# Patient Record
Sex: MALE | Race: WHITE | NOT HISPANIC OR LATINO | Employment: OTHER | URBAN - METROPOLITAN AREA
[De-identification: names, ages, dates, MRNs, and addresses within clinical notes are randomized per-mention and may not be internally consistent; named-entity substitution may affect disease eponyms.]

---

## 2017-02-01 ENCOUNTER — TELEPHONE (OUTPATIENT)
Dept: PULMONOLOGY | Facility: HOSPICE | Age: 82
End: 2017-02-01

## 2017-02-02 NOTE — TELEPHONE ENCOUNTER
Zoe the pt's daughter called requesting samples of Breo 200 for the pt.  Last given: 10/13/16    Last OV: 10/13/16  Next OV: 4/20/17  COPD  Samples of Breo 200

## 2017-02-21 ENCOUNTER — HOSPITAL ENCOUNTER (OUTPATIENT)
Dept: LAB | Facility: MEDICAL CENTER | Age: 82
End: 2017-02-21
Attending: FAMILY MEDICINE
Payer: MEDICARE

## 2017-02-21 LAB
ALBUMIN SERPL BCP-MCNC: 4.4 G/DL (ref 3.2–4.9)
ALBUMIN/GLOB SERPL: 1.8 G/DL
ALP SERPL-CCNC: 46 U/L (ref 30–99)
ALT SERPL-CCNC: 13 U/L (ref 2–50)
ANION GAP SERPL CALC-SCNC: 7 MMOL/L (ref 0–11.9)
AST SERPL-CCNC: 19 U/L (ref 12–45)
BASOPHILS # BLD AUTO: 0.03 K/UL (ref 0–0.12)
BASOPHILS NFR BLD AUTO: 0.6 % (ref 0–1.8)
BILIRUB SERPL-MCNC: 0.9 MG/DL (ref 0.1–1.5)
BUN SERPL-MCNC: 10 MG/DL (ref 8–22)
CALCIUM SERPL-MCNC: 9.9 MG/DL (ref 8.5–10.5)
CHLORIDE SERPL-SCNC: 96 MMOL/L (ref 96–112)
CHOLEST SERPL-MCNC: 206 MG/DL (ref 100–199)
CO2 SERPL-SCNC: 30 MMOL/L (ref 20–33)
CREAT SERPL-MCNC: 0.72 MG/DL (ref 0.5–1.4)
CREAT UR-MCNC: 55.5 MG/DL
EOSINOPHIL # BLD: 0.37 K/UL (ref 0–0.51)
EOSINOPHIL NFR BLD AUTO: 7 % (ref 0–6.9)
ERYTHROCYTE [DISTWIDTH] IN BLOOD BY AUTOMATED COUNT: 47.5 FL (ref 35.9–50)
EST. AVERAGE GLUCOSE BLD GHB EST-MCNC: 105 MG/DL
GLOBULIN SER CALC-MCNC: 2.5 G/DL (ref 1.9–3.5)
GLUCOSE SERPL-MCNC: 99 MG/DL (ref 65–99)
HBA1C MFR BLD: 5.3 % (ref 0–5.6)
HCT VFR BLD AUTO: 38.4 % (ref 42–52)
HDLC SERPL-MCNC: 95 MG/DL
HGB BLD-MCNC: 13.5 G/DL (ref 14–18)
IMM GRANULOCYTES # BLD AUTO: 0.03 K/UL (ref 0–0.11)
IMM GRANULOCYTES NFR BLD AUTO: 0.6 % (ref 0–0.9)
LDLC SERPL CALC-MCNC: 100 MG/DL
LYMPHOCYTES # BLD: 1.76 K/UL (ref 1–4.8)
LYMPHOCYTES NFR BLD AUTO: 33.3 % (ref 22–41)
MCH RBC QN AUTO: 34.9 PG (ref 27–33)
MCHC RBC AUTO-ENTMCNC: 35.2 G/DL (ref 33.7–35.3)
MCV RBC AUTO: 99.2 FL (ref 81.4–97.8)
MICROALBUMIN UR-MCNC: <0.7 MG/DL
MICROALBUMIN/CREAT UR: NORMAL MG/G (ref 0–30)
MONOCYTES # BLD: 0.58 K/UL (ref 0–0.85)
MONOCYTES NFR BLD AUTO: 11 % (ref 0–13.4)
NEUTROPHILS # BLD: 2.51 K/UL (ref 1.82–7.42)
NEUTROPHILS NFR BLD AUTO: 47.5 % (ref 44–72)
NRBC # BLD AUTO: 0 K/UL
NRBC BLD-RTO: 0 /100 WBC
PLATELET # BLD AUTO: 207 K/UL (ref 164–446)
PMV BLD AUTO: 9 FL (ref 9–12.9)
POTASSIUM SERPL-SCNC: 4.8 MMOL/L (ref 3.6–5.5)
PROT SERPL-MCNC: 6.9 G/DL (ref 6–8.2)
PSA SERPL DL<=0.01 NG/ML-MCNC: 0.83 NG/ML (ref 0–4)
RBC # BLD AUTO: 3.87 M/UL (ref 4.7–6.1)
SODIUM SERPL-SCNC: 133 MMOL/L (ref 135–145)
TRIGL SERPL-MCNC: 54 MG/DL (ref 0–149)
TSH SERPL DL<=0.005 MIU/L-ACNC: 2.33 UIU/ML (ref 0.3–3.7)
WBC # BLD AUTO: 5.3 K/UL (ref 4.8–10.8)

## 2017-02-21 PROCEDURE — 84153 ASSAY OF PSA TOTAL: CPT

## 2017-02-21 PROCEDURE — 83036 HEMOGLOBIN GLYCOSYLATED A1C: CPT

## 2017-02-21 PROCEDURE — 84443 ASSAY THYROID STIM HORMONE: CPT

## 2017-02-21 PROCEDURE — 82570 ASSAY OF URINE CREATININE: CPT

## 2017-02-21 PROCEDURE — 80053 COMPREHEN METABOLIC PANEL: CPT

## 2017-02-21 PROCEDURE — 85025 COMPLETE CBC W/AUTO DIFF WBC: CPT

## 2017-02-21 PROCEDURE — 80061 LIPID PANEL: CPT

## 2017-02-21 PROCEDURE — 36415 COLL VENOUS BLD VENIPUNCTURE: CPT

## 2017-02-21 PROCEDURE — 82043 UR ALBUMIN QUANTITATIVE: CPT

## 2017-03-13 DIAGNOSIS — J43.2 CENTRILOBULAR EMPHYSEMA (HCC): ICD-10-CM

## 2017-03-13 NOTE — TELEPHONE ENCOUNTER
Have we ever prescribed this med? Yes.  If yes, what date? Samples 10/13/16    Last OV: 10/13/16    Next OV: 04/20/17    DX: Emphysema    Medications:   Requested Prescriptions     Pending Prescriptions Disp Refills   • Fluticasone Furoate-Vilanterol (BREO ELLIPTA) 200-25 MCG/INH AEROSOL POWDER, BREATH ACTIVATED 1 Each 5     Sig: Inhale 1 Inhalation by mouth every day. Rinse mouth after each use.

## 2017-04-18 DIAGNOSIS — I10 ESSENTIAL HYPERTENSION: ICD-10-CM

## 2017-04-19 RX ORDER — LISINOPRIL 20 MG/1
20 TABLET ORAL 2 TIMES DAILY
Qty: 180 TAB | Refills: 3 | Status: SHIPPED | OUTPATIENT
Start: 2017-04-19 | End: 2018-04-23 | Stop reason: SDUPTHER

## 2017-04-20 ENCOUNTER — OFFICE VISIT (OUTPATIENT)
Dept: PULMONOLOGY | Facility: HOSPICE | Age: 82
End: 2017-04-20
Payer: MEDICARE

## 2017-04-20 VITALS
WEIGHT: 134 LBS | DIASTOLIC BLOOD PRESSURE: 90 MMHG | OXYGEN SATURATION: 90 % | SYSTOLIC BLOOD PRESSURE: 145 MMHG | TEMPERATURE: 98.2 F | HEART RATE: 79 BPM | RESPIRATION RATE: 16 BRPM | BODY MASS INDEX: 21.53 KG/M2 | HEIGHT: 66 IN

## 2017-04-20 DIAGNOSIS — J43.2 CENTRILOBULAR EMPHYSEMA (HCC): ICD-10-CM

## 2017-04-20 PROCEDURE — 99213 OFFICE O/P EST LOW 20 MIN: CPT | Performed by: NURSE PRACTITIONER

## 2017-04-20 PROCEDURE — 1101F PT FALLS ASSESS-DOCD LE1/YR: CPT | Mod: 8P | Performed by: NURSE PRACTITIONER

## 2017-04-20 PROCEDURE — G8420 CALC BMI NORM PARAMETERS: HCPCS | Performed by: NURSE PRACTITIONER

## 2017-04-20 PROCEDURE — 4040F PNEUMOC VAC/ADMIN/RCVD: CPT | Performed by: NURSE PRACTITIONER

## 2017-04-20 PROCEDURE — G8432 DEP SCR NOT DOC, RNG: HCPCS | Performed by: NURSE PRACTITIONER

## 2017-04-20 PROCEDURE — 1036F TOBACCO NON-USER: CPT | Performed by: NURSE PRACTITIONER

## 2017-04-20 NOTE — PATIENT INSTRUCTIONS
1. Continue Advair 500/50 µg twice daily, Spiriva 2 inhalations daily, and pro-air as needed.  2. Continue oxygen at 2 L/m with exertion and 3 L while sleeping.

## 2017-04-20 NOTE — MR AVS SNAPSHOT
"Garrett Alvarez   2017 10:40 AM   Office Visit   MRN: [id number]    Department:  [facility]   Dept Phone:  [phone]    Description:  Male : 1934   Provider:  JESS Jordan           Reason for Visit     Follow-Up 6 Mth Follow Up      Allergies as of 2017     No Known Allergies      You were diagnosed with     Centrilobular emphysema (CMS-HCC)   [611902]         Vital Signs     Blood Pressure Pulse Temperature Respirations Height Weight    145/90 mmHg 79 36.8 °C (98.2 °F) 16 1.676 m (5' 5.98\") 60.782 kg (134 lb)    Body Mass Index Oxygen Saturation Smoking Status             21.64 kg/m2 90% Former Smoker         Basic Information     Date Of Birth Sex Race Ethnicity Preferred Language    1934 Male White Non- English      Your appointments     Oct 26, 2017 11:20 AM   Established Patient Pul with JESS Jordan   [facility] (--)    [address] [place] [address] [address]  [facility] [id number] [phone]              Problem List              ICD-10-CM Priority Class Noted - Resolved    Dyspnea R06.00 Medium  3/1/2013 - Present    HTN (hypertension) I10 High  3/1/2013 - Present    HLD (hyperlipidemia) E78.5 Medium  3/1/2013 - Present    PAH (pulmonary artery hypertension) (CMS-HCC) I27.2 High  2014 - Present    Centrilobular emphysema (CMS-HCC) J43.2 Medium  12/3/2015 - Present      Health Maintenance        Date Due Completion Dates    IMM DTaP/Tdap/Td Vaccine (1 - Tdap) 1953 ---    COLONOSCOPY 1984 ---    IMM ZOSTER VACCINE 1994 ---    IMM PNEUMOCOCCAL 65+ (ADULT) LOW/MEDIUM RISK SERIES (1 of 2 - PCV13) 1999 ---            Current Immunizations     13-VALENT PCV PREVNAR 10/21/2014    Influenza TIV (IM) 10/21/2014      Below and/or attached are the medications your provider expects you to take. Review all of your home medications and newly ordered medications with your provider and/or pharmacist. Follow medication " instructions as directed by your provider and/or pharmacist. Please keep your medication list with you and share with your provider. Update the information when medications are discontinued, doses are changed, or new medications (including over-the-counter products) are added; and carry medication information at all times in the event of emergency situations     Allergies:  No Known Allergies          Medications  Valid as of: April 20, 2017 - 10:49 AM    Generic Name Brand Name Tablet Size Instructions for use    Albuterol Sulfate (Aero Soln) albuterol 108 (90 BASE) MCG/ACT Inhale 2 Puffs by mouth every 6 hours as needed for Shortness of Breath.        Atorvastatin Calcium (Tab) LIPITOR 10 MG Take 10 mg by mouth every evening.          Fluticasone Furoate-Vilanterol (AEROSOL POWDER, BREATH ACTIVATED) Fluticasone Furoate-Vilanterol 200-25 MCG/INH Inhale 1 Inhalation by mouth every day. Rinse mouth after each use.        Fluticasone-Salmeterol (AEROSOL POWDER, BREATH ACTIVATED) ADVAIR 500-50 MCG/DOSE Inhale 1 Puff by mouth every 12 hours.        Lisinopril (Tab) PRINIVIL 20 MG Take 1 Tab by mouth 2 times a day.        Tamsulosin HCl (Cap) FLOMAX 0.4 MG Take 1 Cap by mouth every bedtime.        Tiotropium Bromide Monohydrate (Cap) SPIRIVA 18 MCG Inhale 18 mcg by mouth every day.        .                 Medicines prescribed today were sent to:     [id number] - [place] - [address] [facility]    [address] [facility] [address]    Phone: [phone] Fax: [phone]    Open 24 Hours?: No      Medication refill instructions:       If your prescription bottle indicates you have medication refills left, it is not necessary to call your provider’s office. Please contact your pharmacy and they will refill your medication.    If your prescription bottle indicates you do not have any refills left, you may request refills at any time through one of the following ways: The online Floq system (except Urgent Care), by  calling your provider’s office, or by asking your pharmacy to contact your provider’s office with a refill request. Medication refills are processed only during regular business hours and may not be available until the next business day. Your provider may request additional information or to have a follow-up visit with you prior to refilling your medication.   *Please Note: Medication refills are assigned a new Rx number when refilled electronically. Your pharmacy may indicate that no refills were authorized even though a new prescription for the same medication is available at the pharmacy. Please request the medicine by name with the pharmacy before contacting your provider for a refill.        Instructions    1. Continue Advair 500/50 µg twice daily, Spiriva 2 inhalations daily, and pro-air as needed.  2. Continue oxygen at 2 L/m with exertion and 3 L while sleeping.               Overflow Cafe Access Code: [email or web address]  Expires: 5/20/2017 10:24 AM    Overflow Cafe  A secure, online tool to manage your health information     [employer]’s Overflow Cafe® is a secure, online tool that connects you to your personalized health information from the privacy of your home -- day or night - making it very easy for you to manage your healthcare. Once the activation process is completed, you can even access your medical information using the Overflow Cafe tanja, which is available for free in the Apple Tanja store or Google Play store.     Overflow Cafe provides the following levels of access (as shown below):   My Chart Features   [facility] Doctor [facility]  Specialists [facility]  Urgent  Care Non-[facility]  Primary Care  Doctor   Email your healthcare team securely and privately 24/7 X X X    Manage appointments: schedule your next appointment; view details of past/upcoming appointments X      Request prescription refills. X      View recent personal medical records, including lab and immunizations X X X X   View health record, including health  history, allergies, medications X X X X   Read reports about your outpatient visits, procedures, consult and ER notes X X X X   See your discharge summary, which is a recap of your hospital and/or ER visit that includes your diagnosis, lab results, and care plan. X X       How to register for Openplay:  1. Go to  [email or web address].  2. Click on the Sign Up Now box, which takes you to the New Member Sign Up page. You will need to provide the following information:  a. Enter your Openplay Access Code exactly as it appears at the top of this page. (You will not need to use this code after you’ve completed the sign-up process. If you do not sign up before the expiration date, you must request a new code.)   b. Enter your date of birth.   c. Enter your home email address.   d. Click Submit, and follow the next screen’s instructions.  3. Create a Openplay ID. This will be your Openplay login ID and cannot be changed, so think of one that is secure and easy to remember.  4. Create a Ingresset password. You can change your password at any time.  5. Enter your Password Reset Question and Answer. This can be used at a later time if you forget your password.   6. Enter your e-mail address. This allows you to receive e-mail notifications when new information is available in Openplay.  7. Click Sign Up. You can now view your health information.    For assistance activating your Openplay account, call [phone]

## 2017-04-20 NOTE — PROGRESS NOTES
"Chief Complaint   Patient presents with   • Follow-Up     6 Mth Follow Up         HPI:  This is a 82 y.o. male with a history of chronic obstructive pulmonary disease. Pulmonary function tests from 2013 indicate FEV1 0.76 L, 31% predicted with positive bronchodilator response, FEV1 4/FVC 35%, and DLCO 57% predicted. The patient is compliant with Advair 500/50 µg twice daily, Spiriva 2 inhalations daily, and pro-air minimally. The patient is also compliant with oxygen at 2 L/m with exertion and 3 L while sleeping.. the patient feels that his pulmonary status is stable. He has shortness of breath and wheezing with significant exertion. He has occasional cough. He denies fevers, chills, sweats, and hemoptysis.    Past Medical History   Diagnosis Date   • Dyslipidemia    • Pulmonary hypertension (CMS-HCC)    • COPD (chronic obstructive pulmonary disease) (CMS-HCC)    • Hypertension    • Pulmonary emphysema (CMS-HCC)    • Centrilobular emphysema (CMS-HCC) 12/3/2015       Past Surgical History   Procedure Laterality Date   • Hernia repair  1990     right inguinal hernia        Social History   Substance Use Topics   • Smoking status: Former Smoker     Quit date: 01/01/2008   • Smokeless tobacco: Never Used   • Alcohol Use: 1.0 oz/week     2 Cans of beer per week      Comment: week       ROS:   Constitutional: Denies fevers, chills, sweats, fatigue, and weight loss.  Eyes: Denies glasses.  Ears/nose/mouth/throat: Denies injury.  Cardiovascular: Denies chest pain, tightness.  Respiratory: See history of present illness.  GI: Denies heartburn, difficulty swallowing, nausea, and vomiting.  Neurological: Denies frequent headaches, dizziness, weakness.    Vitals:  Filed Vitals:    04/20/17 1028   Height: 1.676 m (5' 5.98\")   Weight: 60.782 kg (134 lb)   Weight % change since last entry.: 0 %   BP: 145/90   Pulse: 79   BMI (Calculated): 21.64   Resp: 16   Temp: 36.8 °C (98.2 °F)   O2 sat % room air: 90 %       Allergies:  Review " of patient's allergies indicates no known allergies.    Medications:  Current Outpatient Prescriptions   Medication Sig Dispense Refill   • lisinopril (PRINIVIL) 20 MG Tab Take 1 Tab by mouth 2 times a day. 180 Tab 3   • fluticasone-salmeterol (ADVAIR) 500-50 MCG/DOSE AEROSOL POWDER, BREATH ACTIVATED Inhale 1 Puff by mouth every 12 hours. 1 Inhaler 6   • tiotropium (SPIRIVA) 18 MCG Cap Inhale 18 mcg by mouth every day.     • albuterol (VENTOLIN OR PROVENTIL) 108 (90 BASE) MCG/ACT Aero Soln inhalation aerosol Inhale 2 Puffs by mouth every 6 hours as needed for Shortness of Breath.     • tamsulosin (FLOMAX) 0.4 MG capsule Take 1 Cap by mouth every bedtime. 90 Cap 3   • atorvastatin (LIPITOR) 10 MG TABS Take 10 mg by mouth every evening.       • Fluticasone Furoate-Vilanterol (BREO ELLIPTA) 200-25 MCG/INH AEROSOL POWDER, BREATH ACTIVATED Inhale 1 Inhalation by mouth every day. Rinse mouth after each use. (Patient not taking: Reported on 4/20/2017) 1 Each 5     No current facility-administered medications for this visit.       PHYSICAL EXAM:  Appearance: Well-developed, well-nourished, no acute distress.  Eyes. PERRL.  Hearing: Grossly intact.  Oropharynx: Tongue normal, posterior pharynx without erythema or exudate.  Respiratory effort: No intercostal retractions or use of accessory muscles.  Lung auscultation: No crackles, wheezing.  Heart auscultation: No murmur, gallop, or rub. Regular rate and rhythm.  Extremities: No cyanosis or edema.  Gait and Station: Normal  Orientation: Oriented to time, place, and person.    Assessment:  1. Centrilobular emphysema (CMS-HCC)           Plan:  1. Continue Advair 500/50 µg twice daily, Spiriva 2 inhalations daily, and pro-air as needed.  2. Continue oxygen at 2 L/m with exertion and 3 L while sleeping.     Return in about 6 months (around 10/20/2017) for With LEODAN Forde.

## 2017-05-08 DIAGNOSIS — J44.9 CHRONIC OBSTRUCTIVE PULMONARY DISEASE, UNSPECIFIED COPD TYPE (HCC): ICD-10-CM

## 2017-05-08 NOTE — TELEPHONE ENCOUNTER
Have we ever prescribed this med? No.  If yes, what date? Pt had samples    Last OV: 4/20/2017    Next OV: 10/26/2017    DX: COPD    Medications: Spiriva Respimat Samples

## 2017-07-05 ENCOUNTER — TELEPHONE (OUTPATIENT)
Dept: PULMONOLOGY | Facility: HOSPICE | Age: 82
End: 2017-07-05

## 2017-07-05 DIAGNOSIS — J44.9 CHRONIC OBSTRUCTIVE PULMONARY DISEASE, UNSPECIFIED COPD TYPE (HCC): ICD-10-CM

## 2017-07-05 NOTE — TELEPHONE ENCOUNTER
I am fine with him having samples. However, I was informed we no longer are allowed to dispense samples from our office. Please check with the main sample pharmacy to see if Spiriva 2.5 mcg is available.

## 2017-07-05 NOTE — TELEPHONE ENCOUNTER
Zoe the pt's daughter called requesting samples of Spiriva Respimat 2.5.    Last OV: 4/20/17  Next OV: 10/4/17  COPD

## 2017-07-10 ENCOUNTER — TELEPHONE (OUTPATIENT)
Dept: PULMONOLOGY | Facility: HOSPICE | Age: 82
End: 2017-07-10

## 2017-07-10 DIAGNOSIS — J44.9 CHRONIC OBSTRUCTIVE PULMONARY DISEASE, UNSPECIFIED COPD TYPE (HCC): ICD-10-CM

## 2017-07-10 NOTE — TELEPHONE ENCOUNTER
Fortino, Pt Daughter called stating her Father Garrett needed samples of Spiriva. She would also like a prescription for the medication as well.    Last OV 4-20-17  Next OV- 10-4-17   COPD    Samples last oked- 7-5-17

## 2017-07-25 ENCOUNTER — TELEPHONE (OUTPATIENT)
Dept: SLEEP MEDICINE | Facility: MEDICAL CENTER | Age: 82
End: 2017-07-25

## 2017-07-25 DIAGNOSIS — J44.9 CHRONIC OBSTRUCTIVE PULMONARY DISEASE, UNSPECIFIED COPD TYPE (HCC): ICD-10-CM

## 2017-09-14 DIAGNOSIS — Z79.899 MEDICATION MANAGEMENT: ICD-10-CM

## 2017-09-21 ENCOUNTER — OFFICE VISIT (OUTPATIENT)
Dept: PULMONOLOGY | Facility: HOSPICE | Age: 82
End: 2017-09-21
Payer: MEDICARE

## 2017-09-21 VITALS
WEIGHT: 124 LBS | SYSTOLIC BLOOD PRESSURE: 192 MMHG | HEIGHT: 66 IN | BODY MASS INDEX: 19.93 KG/M2 | OXYGEN SATURATION: 94 % | TEMPERATURE: 97.9 F | HEART RATE: 129 BPM | DIASTOLIC BLOOD PRESSURE: 110 MMHG | RESPIRATION RATE: 16 BRPM

## 2017-09-21 DIAGNOSIS — J43.2 CENTRILOBULAR EMPHYSEMA (HCC): ICD-10-CM

## 2017-09-21 DIAGNOSIS — I27.21 PAH (PULMONARY ARTERY HYPERTENSION) (HCC): ICD-10-CM

## 2017-09-21 PROCEDURE — 90662 IIV NO PRSV INCREASED AG IM: CPT | Performed by: NURSE PRACTITIONER

## 2017-09-21 PROCEDURE — G0008 ADMIN INFLUENZA VIRUS VAC: HCPCS | Performed by: NURSE PRACTITIONER

## 2017-09-21 PROCEDURE — 99213 OFFICE O/P EST LOW 20 MIN: CPT | Mod: 25 | Performed by: NURSE PRACTITIONER

## 2017-09-21 RX ORDER — ALBUTEROL SULFATE 2.5 MG/3ML
2.5 SOLUTION RESPIRATORY (INHALATION) EVERY 4 HOURS PRN
Qty: 60 BULLET | Refills: 5 | Status: SHIPPED | OUTPATIENT
Start: 2017-09-21 | End: 2018-06-02

## 2017-09-21 NOTE — PATIENT INSTRUCTIONS
1. Continue Advair 500/50 µg one inhalation twice daily.  2. Spiriva 2.5 µg 2 inhalations daily.  3. Pro-air 2 puffs every 4 hours as needed for shortness of breath or wheezing.  4. Albuterol nebulized treatments, one treatment every 4 hours as needed for shortness of breath or wheezing.  5. Obtain oximeter. Keep oxygen saturations 90-94 percent.  Okay for samples.  7. Influenza vaccine.

## 2017-09-22 NOTE — PROGRESS NOTES
Chief Complaint   Patient presents with   • Follow-Up         HPI:  This is a 83 y.o. male with a history of chronic obstructive pulmonary disease. Pulmonary function tests from 2013 indicate FEV1 0.76 L, 31% predicted with positive bronchodilator response, FEV1 4/FVC 35%, and DLCO 57% predicted. The patient is compliant with Advair 500/50 µg twice daily, Spiriva 2 inhalations daily, and pro-air minimally. The patient came in today on 3 L pulsed oxygen with 83% saturation. The patient required 4 L continuous flow to recovered to 93%. Once patient was recovered his oxygen saturations were able to be reduced to 2 L with oxygen saturations of 94%. Patient requires continuous flow of oxygen. 3 L while sleeping. Unfortunately he has not been doing very well as of late due to pulsed oxygen. I believe he is likely had low oxygen levels on a regular basis and that has been contributing to him feeling poorly. He has shortness of breath with minimal exertion. He denies fevers, chills, sweats, and hemoptysis.    Past Medical History:   Diagnosis Date   • Centrilobular emphysema (CMS-HCC) 12/3/2015   • COPD (chronic obstructive pulmonary disease) (CMS-HCC)    • Dyslipidemia    • Hypertension    • Pulmonary emphysema (CMS-HCC)    • Pulmonary hypertension (CMS-HCC)        Past Surgical History:   Procedure Laterality Date   • HERNIA REPAIR  1990    right inguinal hernia        Social History   Substance Use Topics   • Smoking status: Former Smoker     Quit date: 1/1/2008   • Smokeless tobacco: Never Used   • Alcohol use 1.0 oz/week     2 Cans of beer per week      Comment: week       ROS:   Constitutional: Denies fevers, chills, sweats, fatigue, and weight loss.  Eyes: Denies glasses.  Ears/nose/mouth/throat: Denies injury.  Cardiovascular: Denies chest pain, tightness.  Respiratory: See history of present illness.  GI: Denies heartburn, difficulty swallowing, nausea, and vomiting.  Neurological: Denies frequent headaches,  "dizziness, weakness.    Vitals:  Vitals:    09/21/17 1634   Height: 1.676 m (5' 5.98\")   Weight: 56.2 kg (124 lb)   Weight % change since last entry.: 0 %   BP: (!) 192/110   Pulse: (!) 129   BMI (Calculated): 20.03   Resp: 16   Temp: 36.6 °C (97.9 °F)   O2 sat % on O2: 84 %   O2 Flow Rate (L/min): 3       Allergies:  Review of patient's allergies indicates no known allergies.    Medications:  Current Outpatient Prescriptions   Medication Sig Dispense Refill   • albuterol (PROVENTIL) 2.5mg/3ml Nebu Soln solution for nebulization 3 mL by Nebulization route every four hours as needed for Shortness of Breath. 60 Bullet 5   • fluticasone-salmeterol (ADVAIR) 500-50 MCG/DOSE AEROSOL POWDER, BREATH ACTIVATED Inhale 1 Puff by mouth every 12 hours. Rinse mouth after use 1 Inhaler 5   • Tiotropium Bromide Monohydrate (SPIRIVA RESPIMAT) 2.5 MCG/ACT Aero Soln Inhale 5 mcg by mouth every day. 1 Inhaler 5   • Tiotropium Bromide Monohydrate (SPIRIVA RESPIMAT) 2.5 MCG/ACT Aero Soln Inhale 2 Puffs by mouth every day. 30 Inhaler 0   • lisinopril (PRINIVIL) 20 MG Tab Take 1 Tab by mouth 2 times a day. 180 Tab 3   • tiotropium (SPIRIVA) 18 MCG Cap Inhale 18 mcg by mouth every day.     • albuterol (VENTOLIN OR PROVENTIL) 108 (90 BASE) MCG/ACT Aero Soln inhalation aerosol Inhale 2 Puffs by mouth every 6 hours as needed for Shortness of Breath.     • tamsulosin (FLOMAX) 0.4 MG capsule Take 1 Cap by mouth every bedtime. 90 Cap 3   • atorvastatin (LIPITOR) 10 MG TABS Take 10 mg by mouth every evening.         No current facility-administered medications for this visit.        PHYSICAL EXAM:  Appearance: Well-developed, well-nourished, no acute distress.  Eyes. PERRL.  Hearing: Grossly intact.  Oropharynx: Tongue normal, posterior pharynx without erythema or exudate.  Respiratory effort: No intercostal retractions or use of accessory muscles.  Lung auscultation: No crackles, wheezing.  Heart auscultation: No murmur, gallop, or rub. Regular " rate and rhythm.  Extremities: No cyanosis or edema.  Gait and Station: Normal  Orientation: Oriented to time, place, and person.    Assessment:  1. Centrilobular emphysema (CMS-[facility])  DME OTHER    INFLUENZA VACCINE, HIGH DOSE (65+ ONLY)    DME NEBULIZER   2. PAH (pulmonary artery hypertension) (CMS-HCC)           Plan:  1. Continue Advair 500/50 µg one inhalation twice daily.  2. Spiriva 2.5 µg 2 inhalations daily.  3. Pro-air 2 puffs every 4 hours as needed for shortness of breath or wheezing.  4. Albuterol nebulized treatments, one treatment every 4 hours as needed for shortness of breath or wheezing.  5. Obtain oximeter. Keep oxygen saturations 90-94 percent.  6. Okay for samples.  7. Influenza vaccine.    Return in about 3 months (around 12/21/2017) for With LEODAN Forde.

## 2017-10-10 DIAGNOSIS — J44.9 CHRONIC OBSTRUCTIVE PULMONARY DISEASE, UNSPECIFIED COPD TYPE (HCC): ICD-10-CM

## 2017-10-10 NOTE — TELEPHONE ENCOUNTER
Have we ever prescribed this med? Yes.  If yes, what date? 7/12/17    Last OV: 9/12/17- DBaker    Next OV: 12/21/17    DX: COPD    Medications: Spiriva Respimat 2.5 MCG

## 2017-11-01 ENCOUNTER — OFFICE VISIT (OUTPATIENT)
Dept: CARDIOLOGY | Facility: MEDICAL CENTER | Age: 82
End: 2017-11-01
Payer: MEDICARE

## 2017-11-01 VITALS
DIASTOLIC BLOOD PRESSURE: 70 MMHG | HEART RATE: 98 BPM | SYSTOLIC BLOOD PRESSURE: 130 MMHG | BODY MASS INDEX: 20.03 KG/M2 | OXYGEN SATURATION: 95 % | WEIGHT: 124 LBS

## 2017-11-01 DIAGNOSIS — I10 ESSENTIAL HYPERTENSION: ICD-10-CM

## 2017-11-01 DIAGNOSIS — E78.00 PURE HYPERCHOLESTEROLEMIA: ICD-10-CM

## 2017-11-01 PROCEDURE — 99213 OFFICE O/P EST LOW 20 MIN: CPT | Performed by: INTERNAL MEDICINE

## 2017-11-01 ASSESSMENT — ENCOUNTER SYMPTOMS
MYALGIAS: 0
NERVOUS/ANXIOUS: 0
PALPITATIONS: 0
WEIGHT LOSS: 0
INSOMNIA: 0
EYES NEGATIVE: 1

## 2017-11-01 NOTE — PROGRESS NOTES
Subjective:   Garrett Alvarez is a 83 y.o. 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 Blood pressures have been very good at home    Past Medical History:   Diagnosis Date   • Centrilobular emphysema (CMS-HCC) 12/3/2015   • COPD (chronic obstructive pulmonary disease) (CMS-HCC)    • Dyslipidemia    • Hypertension    • Pulmonary emphysema (CMS-HCC)    • Pulmonary hypertension      Past Surgical History:   Procedure Laterality Date   • HERNIA REPAIR  1990    right inguinal hernia      Family History   Problem Relation Age of Onset   • Heart Disease Father    • Heart Attack Brother    • Other Brother      CAD   • Cancer Brother      lung, other     History   Smoking Status   • Former Smoker   • Quit date: 1/1/2008   Smokeless Tobacco   • Never Used     No Known Allergies  Outpatient Encounter Prescriptions as of 11/1/2017   Medication Sig Dispense Refill   • albuterol (PROVENTIL) 2.5mg/3ml Nebu Soln solution for nebulization 3 mL by Nebulization route every four hours as needed for Shortness of Breath. 60 Bullet 5   • fluticasone-salmeterol (ADVAIR) 500-50 MCG/DOSE AEROSOL POWDER, BREATH ACTIVATED Inhale 1 Puff by mouth every 12 hours. Rinse mouth after use 1 Inhaler 5   • Tiotropium Bromide Monohydrate (SPIRIVA RESPIMAT) 2.5 MCG/ACT Aero Soln Inhale 5 mcg by mouth every day. 1 Inhaler 5   • lisinopril (PRINIVIL) 20 MG Tab Take 1 Tab by mouth 2 times a day. 180 Tab 3   • tiotropium (SPIRIVA) 18 MCG Cap Inhale 18 mcg by mouth every day.     • albuterol (VENTOLIN OR PROVENTIL) 108 (90 BASE) MCG/ACT Aero Soln inhalation aerosol Inhale 2 Puffs by mouth every 6 hours as needed for Shortness of Breath.     • tamsulosin (FLOMAX) 0.4 MG capsule Take 1 Cap by mouth every bedtime. 90 Cap 3   • atorvastatin (LIPITOR) 10  MG TABS Take 10 mg by mouth every evening.       • Tiotropium Bromide Monohydrate (SPIRIVA RESPIMAT) 2.5 MCG/ACT Aero Soln Inhale 2 Puffs by mouth every day. 3 Inhaler 3     No facility-administered encounter medications on file as of 11/1/2017.      Review of Systems   Constitutional: Negative for malaise/fatigue and weight loss.   Eyes: Negative.    Respiratory:        Stable, with oxygen at night but doesn't like to wear it in the day   Cardiovascular: Negative for chest pain, palpitations and leg swelling.   Musculoskeletal: Negative for myalgias.   Psychiatric/Behavioral: The patient is not nervous/anxious and does not have insomnia.    All other systems reviewed and are negative.       Objective:   /70   Pulse 98   Wt 56.2 kg (124 lb)   SpO2 95%   BMI 20.03 kg/m²     Physical Exam   Constitutional: He is oriented to person, place, and time. He appears well-developed. No distress.   HENT:   Mouth/Throat: Mucous membranes are normal.   Eyes: Conjunctivae and EOM are normal.   Neck: No JVD present. No tracheal deviation present. No thyroid mass and no thyromegaly present.   Cardiovascular: Normal rate, regular rhythm and intact distal pulses.    No murmur heard.  Pulmonary/Chest: Effort normal and breath sounds normal. No respiratory distress. He exhibits no tenderness.   Abdominal: There is no tenderness.   Musculoskeletal: Normal range of motion. He exhibits no edema.   Neurological: He is alert and oriented to person, place, and time. He has normal strength. He displays no tremor.   Skin: Skin is warm and dry. He is not diaphoretic.   Psychiatric: He has a normal mood and affect. His behavior is normal.   Vitals reviewed.      Assessment:     1. Essential hypertension     2. Pure hypercholesterolemia         Medical Decision Making:  Today's Assessment / Status / Plan:     Hypertension   I spent more than 15 minutes reviewing his chart. We reviewed his echocardiogram from 2014, his pulmonary  hypertension is in the setting of emphysema. Blood pressure goal, no changes     Hyperlipidemia   Follows a primary care   CPK lipids and liver function annually with his statin     RTC one year, sooner with concerns

## 2017-12-21 ENCOUNTER — OFFICE VISIT (OUTPATIENT)
Dept: PULMONOLOGY | Facility: HOSPICE | Age: 82
End: 2017-12-21
Payer: MEDICARE

## 2017-12-21 VITALS
SYSTOLIC BLOOD PRESSURE: 128 MMHG | BODY MASS INDEX: 20.57 KG/M2 | HEIGHT: 66 IN | OXYGEN SATURATION: 98 % | TEMPERATURE: 97.5 F | RESPIRATION RATE: 16 BRPM | DIASTOLIC BLOOD PRESSURE: 70 MMHG | WEIGHT: 128 LBS | HEART RATE: 99 BPM

## 2017-12-21 DIAGNOSIS — J43.2 CENTRILOBULAR EMPHYSEMA (HCC): ICD-10-CM

## 2017-12-21 PROCEDURE — 94761 N-INVAS EAR/PLS OXIMETRY MLT: CPT | Performed by: NURSE PRACTITIONER

## 2017-12-21 PROCEDURE — 99213 OFFICE O/P EST LOW 20 MIN: CPT | Performed by: NURSE PRACTITIONER

## 2017-12-21 NOTE — PROGRESS NOTES
"Chief Complaint   Patient presents with   • Follow-Up     pulmonary artery hypertension         HPI:  This is a 83 y.o. male with a history of chronic obstructive pulmonary disease. Pulmonary function tests from 2013 indicate FEV1 0.76 L, 31% predicted with positive bronchodilator response, FEV1 4/FVC 35%, and DLCO 57% predicted. The patient is compliant with Advair 500/50 µg twice daily, Spiriva 2 inhalations daily, and pro-air minimally. Patient's oxygenation is adequate on room air at rest. He requires 4 L continuous flow if up and ambulating. Patient reports no significant shortness of breath and feeling tired if he walks too far. He has had some dizzy spells but has seen her primary care and had his ears cleaned and is improving. He denies wheezing, fever, chills, sweats, and hemoptysis.    Past Medical History:   Diagnosis Date   • Centrilobular emphysema (CMS-HCC) 12/3/2015   • COPD (chronic obstructive pulmonary disease) (CMS-HCC)    • Dyslipidemia    • Hypertension    • Pulmonary emphysema (CMS-HCC)    • Pulmonary hypertension        Past Surgical History:   Procedure Laterality Date   • HERNIA REPAIR  1990    right inguinal hernia        Social History   Substance Use Topics   • Smoking status: Former Smoker     Quit date: 1/1/2008   • Smokeless tobacco: Never Used   • Alcohol use 1.0 oz/week     2 Cans of beer per week      Comment: week       ROS:   Constitutional: Denies fevers, chills, sweats, fatigue, and weight loss.  Eyes: Denies glasses.  Ears/nose/mouth/throat: Denies injury.  Cardiovascular: Denies chest pain, tightness.  Respiratory: See history of present illness.  GI: Denies heartburn, difficulty swallowing, nausea, and vomiting.  Neurological: Denies frequent headaches, dizziness, weakness.    Vitals:  Vitals:    12/21/17 1332   Height: 1.676 m (5' 5.98\")   Weight: 58.1 kg (128 lb)   Weight % change since last entry.: 0 %   BP: 128/70   Pulse: 99   BMI (Calculated): 20.67   Resp: 16   Temp: " 36.4 °C (97.5 °F)   O2 sat % room air: 98 %   O2 Flow Rate (L/min): 3       Allergies:  Patient has no known allergies.    Medications:  Current Outpatient Prescriptions   Medication Sig Dispense Refill   • Tiotropium Bromide Monohydrate (SPIRIVA RESPIMAT) 2.5 MCG/ACT Aero Soln Inhale 2 Puffs by mouth every day. 3 Inhaler 3   • albuterol (PROVENTIL) 2.5mg/3ml Nebu Soln solution for nebulization 3 mL by Nebulization route every four hours as needed for Shortness of Breath. 60 Bullet 5   • fluticasone-salmeterol (ADVAIR) 500-50 MCG/DOSE AEROSOL POWDER, BREATH ACTIVATED Inhale 1 Puff by mouth every 12 hours. Rinse mouth after use 1 Inhaler 5   • Tiotropium Bromide Monohydrate (SPIRIVA RESPIMAT) 2.5 MCG/ACT Aero Soln Inhale 5 mcg by mouth every day. 1 Inhaler 5   • lisinopril (PRINIVIL) 20 MG Tab Take 1 Tab by mouth 2 times a day. 180 Tab 3   • tiotropium (SPIRIVA) 18 MCG Cap Inhale 18 mcg by mouth every day.     • albuterol (VENTOLIN OR PROVENTIL) 108 (90 BASE) MCG/ACT Aero Soln inhalation aerosol Inhale 2 Puffs by mouth every 6 hours as needed for Shortness of Breath.     • tamsulosin (FLOMAX) 0.4 MG capsule Take 1 Cap by mouth every bedtime. 90 Cap 3   • atorvastatin (LIPITOR) 10 MG TABS Take 10 mg by mouth every evening.         No current facility-administered medications for this visit.        PHYSICAL EXAM:  Appearance: Well-developed, well-nourished, no acute distress.  Eyes. PERRL.  Hearing: Grossly intact.  Oropharynx: Tongue normal, posterior pharynx without erythema or exudate.  Respiratory effort: No intercostal retractions or use of accessory muscles.  Lung auscultation: No crackles, wheezing.  Heart auscultation: No murmur, gallop, or rub. Regular rate and rhythm.  Extremities: No cyanosis or edema.  Gait and Station: Normal  Orientation: Oriented to time, place, and person.    Assessment:  1. Centrilobular emphysema (CMS-HCC)  AMB MULTIPLE OXIMETRY         Plan:  1. Continue Advair 500/50 µg twice daily,  Spiriva 2.5 µg 2 inhalations daily, and pro-air and albuterol nebulized treatments as needed.  2. Continue oxygen 4 L continuous flow with ambulation and while sleeping.  3. Keep oxygen saturation 90-94 percent.  4. Discussed the possibility of pulmonary rehabilitation. He is aware he would have to repeat pulmonary function tests. He would like to discuss that at his next visit.    Return in about 6 months (around 6/21/2018).

## 2017-12-21 NOTE — PATIENT INSTRUCTIONS
1. Continue Advair 500/50 µg twice daily, Spiriva 2.5 µg 2 inhalations daily, and pro-air and albuterol nebulized treatments as needed.  2. Continue oxygen 4 L continuous flow with ambulation and while sleeping.  3. Keep oxygen saturation 90-94 percent.

## 2017-12-21 NOTE — PROCEDURES
"Durable Medical Equipment-Specific Vitals  Vitals  Blood Pressure : 128/70  Temperature: 36.4 °C (97.5 °F)  Pulse: 99  Respiration: 16  Pulse Oximetry: 98 %  Height: 167.6 cm (5' 5.98\")  Weight: 58.1 kg (128 lb)          "

## 2018-03-27 ENCOUNTER — OFFICE VISIT (OUTPATIENT)
Dept: CARDIOLOGY | Facility: MEDICAL CENTER | Age: 83
End: 2018-03-27
Payer: MEDICARE

## 2018-03-27 VITALS
WEIGHT: 127.2 LBS | BODY MASS INDEX: 20.44 KG/M2 | HEART RATE: 106 BPM | DIASTOLIC BLOOD PRESSURE: 90 MMHG | SYSTOLIC BLOOD PRESSURE: 140 MMHG | HEIGHT: 66 IN | OXYGEN SATURATION: 98 %

## 2018-03-27 DIAGNOSIS — E78.00 PURE HYPERCHOLESTEROLEMIA: ICD-10-CM

## 2018-03-27 DIAGNOSIS — I10 ESSENTIAL HYPERTENSION: ICD-10-CM

## 2018-03-27 DIAGNOSIS — I27.21 PAH (PULMONARY ARTERY HYPERTENSION) (HCC): ICD-10-CM

## 2018-03-27 PROCEDURE — 99214 OFFICE O/P EST MOD 30 MIN: CPT | Performed by: INTERNAL MEDICINE

## 2018-03-27 ASSESSMENT — ENCOUNTER SYMPTOMS
NERVOUS/ANXIOUS: 0
EYES NEGATIVE: 1
BRUISES/BLEEDS EASILY: 0
DEPRESSION: 0
WEIGHT LOSS: 0
NAUSEA: 0
HEARTBURN: 0
PALPITATIONS: 0
COUGH: 0
FEVER: 0
INSOMNIA: 0
WHEEZING: 0
LOSS OF CONSCIOUSNESS: 0
SHORTNESS OF BREATH: 0
MUSCULOSKELETAL NEGATIVE: 1
DIZZINESS: 0

## 2018-03-27 NOTE — PROGRESS NOTES
Chief Complaint   Patient presents with   • Hypertension     F/V       Subjective:   Garrett Alvarez is a 83 y.o. male who presents today in follow-up in regards to his cardiovascular risk factors including hypertension and hyperlipidemia as well as pulmonary hypertension the setting of advanced emphysema    In good spirits, in with his daughter Fortino who works here  No setbacks not sure why he is here today  Wife is doing well      Past Medical History:   Diagnosis Date   • Centrilobular emphysema (CMS-HCC) 12/3/2015   • COPD (chronic obstructive pulmonary disease) (CMS-HCC)    • Dyslipidemia    • Hypertension    • Pulmonary emphysema (CMS-HCC)    • Pulmonary hypertension      Past Surgical History:   Procedure Laterality Date   • HERNIA REPAIR  1990    right inguinal hernia      Family History   Problem Relation Age of Onset   • Heart Disease Father    • Heart Attack Brother    • Other Brother      CAD   • Cancer Brother      lung, other     Social History     Social History   • Marital status:      Spouse name: N/A   • Number of children: N/A   • Years of education: N/A     Occupational History   • Not on file.     Social History Main Topics   • Smoking status: Former Smoker     Quit date: 1/1/2008   • Smokeless tobacco: Never Used   • Alcohol use 1.0 oz/week     2 Cans of beer per week      Comment: week   • Drug use: No   • Sexual activity: Yes     Partners: Female     Other Topics Concern   • Not on file     Social History Narrative   • No narrative on file     No Known Allergies  Outpatient Encounter Prescriptions as of 3/27/2018   Medication Sig Dispense Refill   • albuterol (PROVENTIL) 2.5mg/3ml Nebu Soln solution for nebulization 3 mL by Nebulization route every four hours as needed for Shortness of Breath. 60 Bullet 5   • fluticasone-salmeterol (ADVAIR) 500-50 MCG/DOSE AEROSOL POWDER, BREATH ACTIVATED Inhale 1 Puff by mouth every 12 hours. Rinse mouth after use 1 Inhaler 5   • Tiotropium  "Bromide Monohydrate (SPIRIVA RESPIMAT) 2.5 MCG/ACT Aero Soln Inhale 5 mcg by mouth every day. 1 Inhaler 5   • lisinopril (PRINIVIL) 20 MG Tab Take 1 Tab by mouth 2 times a day. 180 Tab 3   • tamsulosin (FLOMAX) 0.4 MG capsule Take 1 Cap by mouth every bedtime. 90 Cap 3   • atorvastatin (LIPITOR) 10 MG TABS Take 10 mg by mouth every evening.       • [DISCONTINUED] Tiotropium Bromide Monohydrate (SPIRIVA RESPIMAT) 2.5 MCG/ACT Aero Soln Inhale 2 Puffs by mouth every day. 3 Inhaler 3   • tiotropium (SPIRIVA) 18 MCG Cap Inhale 18 mcg by mouth every day.     • [DISCONTINUED] albuterol (VENTOLIN OR PROVENTIL) 108 (90 BASE) MCG/ACT Aero Soln inhalation aerosol Inhale 2 Puffs by mouth every 6 hours as needed for Shortness of Breath.       No facility-administered encounter medications on file as of 3/27/2018.      Review of Systems   Constitutional: Negative for fever, malaise/fatigue and weight loss.   HENT: Negative for hearing loss.    Eyes: Negative.    Respiratory: Negative for cough, shortness of breath and wheezing.         No changes on chronic oxygen, 4 L   Cardiovascular: Negative for chest pain, palpitations and leg swelling.   Gastrointestinal: Negative for heartburn and nausea.   Musculoskeletal: Negative.    Neurological: Negative for dizziness and loss of consciousness.   Endo/Heme/Allergies: Does not bruise/bleed easily.   Psychiatric/Behavioral: Negative for depression. The patient is not nervous/anxious and does not have insomnia.    All other systems reviewed and are negative.       Objective:   /90   Pulse (!) 106   Ht 1.676 m (5' 6\")   Wt 57.7 kg (127 lb 3.2 oz)   SpO2 98%   BMI 20.53 kg/m²     Physical Exam   Constitutional: He is oriented to person, place, and time.   Cachectic, barrel chested on oxygen   HENT:   Head: Normocephalic and atraumatic.   Eyes: Conjunctivae and EOM are normal. Pupils are equal, round, and reactive to light.   Neck: Neck supple. No JVD present. No thyromegaly " present.   Cardiovascular: Normal rate, regular rhythm and intact distal pulses.    No murmur heard.  Heart rates in the 80s when I checked, regular   Pulmonary/Chest: No respiratory distress. He exhibits no tenderness.   Abdominal: Bowel sounds are normal.   Musculoskeletal: Normal range of motion. He exhibits no edema or tenderness.   Lymphadenopathy:     He has no cervical adenopathy.   Neurological: He is alert and oriented to person, place, and time. No cranial nerve deficit. He exhibits normal muscle tone. Coordination normal.   Skin: Skin is warm and dry. No rash noted.   Psychiatric: He has a normal mood and affect. His behavior is normal.       Assessment:     1. Pure hypercholesterolemia     2. Essential hypertension     3. PAH (pulmonary artery hypertension)         Medical Decision Making:  Today's Assessment / Status / Plan:     Extensive chart reviewed, he had seen one of my partners for years based on his cardiovascular risk factor  Follows closely with pulmonary, their notes were reviewed from last month    Pulmonary hypertension  Secondary to COPD/cor pulmonale  Prognosis is Entirely in his lung disease, we discussed this  Follow with pulmonary, no indication for echo at this point  Oxygen therapy    Hypertension/hyperlipidemia  Medications reviewed, follows this with his primary    RTC one year sooner with concerns

## 2018-04-18 ENCOUNTER — OFFICE VISIT (OUTPATIENT)
Dept: PULMONOLOGY | Facility: HOSPICE | Age: 83
End: 2018-04-18
Payer: MEDICARE

## 2018-04-18 VITALS
DIASTOLIC BLOOD PRESSURE: 80 MMHG | SYSTOLIC BLOOD PRESSURE: 130 MMHG | HEART RATE: 97 BPM | RESPIRATION RATE: 16 BRPM | HEIGHT: 66 IN | BODY MASS INDEX: 20.09 KG/M2 | OXYGEN SATURATION: 98 % | WEIGHT: 125 LBS | TEMPERATURE: 97.7 F

## 2018-04-18 DIAGNOSIS — Z23 NEED FOR PNEUMOCOCCAL VACCINE: ICD-10-CM

## 2018-04-18 DIAGNOSIS — J43.2 CENTRILOBULAR EMPHYSEMA (HCC): ICD-10-CM

## 2018-04-18 DIAGNOSIS — J96.11 CHRONIC HYPOXEMIC RESPIRATORY FAILURE (HCC): ICD-10-CM

## 2018-04-18 PROCEDURE — G0009 ADMIN PNEUMOCOCCAL VACCINE: HCPCS | Performed by: NURSE PRACTITIONER

## 2018-04-18 PROCEDURE — 90732 PPSV23 VACC 2 YRS+ SUBQ/IM: CPT | Performed by: NURSE PRACTITIONER

## 2018-04-18 PROCEDURE — 94761 N-INVAS EAR/PLS OXIMETRY MLT: CPT | Performed by: NURSE PRACTITIONER

## 2018-04-18 PROCEDURE — 99214 OFFICE O/P EST MOD 30 MIN: CPT | Mod: 25 | Performed by: NURSE PRACTITIONER

## 2018-04-18 NOTE — PROGRESS NOTES
Chief Complaint   Patient presents with   • Follow-Up       HPI:  Garrett Alvarez is a 83 y.o. year old male here today for follow-up on his chronic obstructive pulmonary disease. He has been followed by [facility] in the past. Pulmonary function tests from 2013 indicated an FEV1 of 0.76 L, 31% predicted with positive bronchodilator response, FEV1/FVC ratio of 35% and a DLCO of 57% predicted. He is compliant with Advair 500/50 µg twice daily, Spiriva 2 inhalations daily, and pro-air minimally. He has been recommended 02 at 4 LPM nocturnally and with exertion. His resting 02 saturation on 4 LPM today was 99%.   He feels his chronic dyspnea is unchanged. He notes occasional cough which is productive with clear mucous. He notes an occasional wheeze which is unchanged. He denies any fevers or chills. He denies any recent respiratory infections.     Past Medical History:   Diagnosis Date   • Centrilobular emphysema (CMS-HCC) 12/3/2015   • COPD (chronic obstructive pulmonary disease) (CMS-HCC)    • Dyslipidemia    • Hypertension    • Pulmonary emphysema (CMS-HCC)    • Pulmonary hypertension        Past Surgical History:   Procedure Laterality Date   • HERNIA REPAIR  1990    right inguinal hernia        Family History   Problem Relation Age of Onset   • Heart Disease Father    • Heart Attack Brother    • Other Brother      CAD   • Cancer Brother      lung, other       Social History     Social History   • Marital status:      Spouse name: N/A   • Number of children: N/A   • Years of education: N/A     Occupational History   • Not on file.     Social History Main Topics   • Smoking status: Former Smoker     Quit date: 1/1/2008   • Smokeless tobacco: Never Used   • Alcohol use 1.0 oz/week     2 Cans of beer per week      Comment: week   • Drug use: No   • Sexual activity: Yes     Partners: Female     Other Topics Concern   • Not on file     Social History Narrative   • No narrative on file  "        ROS:  Constitutional: Denies fevers, chills, sweats, fatigue, weight loss  Eyes: Denies glasses, vision loss, pain, drainage, double vision  Ears/Nose/Mouth/Throat: Denies rhinitis, nasal congestion, ear ache, difficulty hearing, sore throat, persistent hoarseness, decayed teeth/toothache  Cardiovascular: Denies chest pain, tightness, palpitations, swelling in feet/legs, fainting, difficulty breathing when laying down  Respiratory: See HPI   GI: Denies heartburn, difficulty swallowing, nausea, vomiting, abdominal pain, diarrhea, constipation  : Denies frequent urination, painful urination  Integumentary: Denies rashes, lumps or color changes  MSK: Denies painful joints, sore muscles, and back pain.   Neurological: Denies frequent headaches, dizziness, weakness        Current Outpatient Prescriptions   Medication Sig Dispense Refill   • albuterol (PROVENTIL) 2.5mg/3ml Nebu Soln solution for nebulization 3 mL by Nebulization route every four hours as needed for Shortness of Breath. 60 Bullet 5   • fluticasone-salmeterol (ADVAIR) 500-50 MCG/DOSE AEROSOL POWDER, BREATH ACTIVATED Inhale 1 Puff by mouth every 12 hours. Rinse mouth after use 1 Inhaler 5   • Tiotropium Bromide Monohydrate (SPIRIVA RESPIMAT) 2.5 MCG/ACT Aero Soln Inhale 5 mcg by mouth every day. 1 Inhaler 5   • lisinopril (PRINIVIL) 20 MG Tab Take 1 Tab by mouth 2 times a day. 180 Tab 3   • tiotropium (SPIRIVA) 18 MCG Cap Inhale 18 mcg by mouth every day.     • tamsulosin (FLOMAX) 0.4 MG capsule Take 1 Cap by mouth every bedtime. 90 Cap 3   • atorvastatin (LIPITOR) 10 MG TABS Take 10 mg by mouth every evening.         No current facility-administered medications for this visit.        No Known Allergies    Blood pressure 130/80, pulse 97, temperature 36.5 °C (97.7 °F), resp. rate 16, height 1.676 m (5' 6\"), weight 56.7 kg (125 lb), SpO2 98 %.    PE:   Appearance: Thin male, no acute distress  Eyes: PERRL, EOM intact, sclera white, conjunctiva " moist  Ears: no lesions or deformities  Hearing: grossly intact  Nose: no lesions or deformities  Oropharynx: tongue normal, posterior pharynx without erythema or exudate  Neck: supple, trachea midline, no masses   Respiratory effort: no intercostal retractions or use of accessory muscles  Lung auscultation: no rales, rhonchi or wheezes, somewhat diminished throughout  Heart auscultation: no murmur rub or gallop  Extremities: no cyanosis or edema  Abdomen: soft ,non tender, no masses  Gait and Station: normal  Digits and nails: no clubbing, cyanosis, petechiae or nodes.  Cranial nerves: grossly intact  Skin: no rashes, lesions or ulcers noted  Orientation: Oriented to time, person and place  Mood and affect: mood and affect appropriate, normal interaction with examiner  Judgement: Intact          Assessment:  1. Centrilobular emphysema (CMS-HCC)  AMB MULTIPLE OXIMETRY    DME OTHER    DME [facility] BY [facility]    AMB PULMONARY FUNCTION TEST/LAB    REFERRAL TO [facility] (HIP) Services Requested: Pulmonary Rehab; Reason for Visit: COPD/Emphysema   2. Chronic hypoxemic respiratory failure (CMS-HCC)  AMB MULTIPLE OXIMETRY    DME OTHER    DME [facility]   3. Need for pneumococcal vaccine  PNEUMOCOCCAL POLYSACCHARIDE VACCINE 23-VALENT =>3YO SQ/IM         Plan:    1) Decrease 02 to 2.5 LPM 24/7. His saturations remained above 90% at rest on 2.5 LPM. Multi oximetry indicates adequate 02 saturations on 2.5 LPM with exertion as well. Obtain overnight oximetry on 2.5 LPM to ensure adequate 02 saturations.   He is interested in getting a lightweight POC. Order sent to [facility].  2) Continue Advair, Spiriva, Albuterol per neb and Proair HFA inhaler.  3) Update PFT's and refer to Pulmonary rehab.  4) He is up to date on his Prevnar 13 and Influenza vaccinations. Pneumovax 23 given today in the office.   5) Follow up in 2 months after CNOX and PFT's, sooner OV if needed.

## 2018-04-18 NOTE — PROCEDURES
"Durable Medical Equipment-Specific Vitals  Vitals  Blood Pressure : 130/80  Temperature: 36.5 °C (97.7 °F)  Pulse: 97  Respiration: 16  Pulse Oximetry: 98 %  Height: 167.6 cm (5' 6\")  Weight: 56.7 kg (125 lb)          "

## 2018-04-18 NOTE — PATIENT INSTRUCTIONS
Plan:    1) Decrease 02 to 2.5 LPM 24/7. His saturations remained above 90% at rest on 2.5 LPM. Multi oximetry indicates adequate 02 saturations on 2.5 LPM with exertion as well. Obtain overnight oximetry on 2.5 LPM to ensure adequate 02 saturations.   He is interested in getting a lightweight POC. Order sent to [facility].  2) Continue Advair, Spiriva, Albuterol per neb and Proair HFA inhaler.  3) Update PFT's and refer to Pulmonary rehab.  4) He is up to date on his Prevnar 13 and Influenza vaccinations. Pneumovax 23 given today in the office.   5) Follow up in 2 months after CNOX and PFT's, sooner OV if needed.

## 2018-04-23 DIAGNOSIS — I10 ESSENTIAL HYPERTENSION: ICD-10-CM

## 2018-04-23 RX ORDER — LISINOPRIL 20 MG/1
20 TABLET ORAL 2 TIMES DAILY
Qty: 180 TAB | Refills: 3 | Status: SHIPPED | OUTPATIENT
Start: 2018-04-23 | End: 2020-03-25

## 2018-05-22 DIAGNOSIS — J43.2 CENTRILOBULAR EMPHYSEMA (HCC): ICD-10-CM

## 2018-05-22 NOTE — TELEPHONE ENCOUNTER
Pharmacy is requesting an rx for Anoro Ellipta.    Last seen: 4/18/18- VALERIA Pérez  Next ov: 6/21/18  Centrilobular Emphysema

## 2018-05-24 ENCOUNTER — TELEPHONE (OUTPATIENT)
Dept: PULMONOLOGY | Facility: HOSPICE | Age: 83
End: 2018-05-24

## 2018-05-24 DIAGNOSIS — J43.2 CENTRILOBULAR EMPHYSEMA (HCC): ICD-10-CM

## 2018-05-24 NOTE — TELEPHONE ENCOUNTER
I called the pt and gave him your last message.  He states that he's currently taking Advair for it is only $50 at pharmacy.  Pt is requesting a sample of the rx Anoro to try and states that the rx is $99 at his local pharmacy.    Last seen: 4/18/18- VALERIA Wilcoxphil emphysema

## 2018-05-24 NOTE — TELEPHONE ENCOUNTER
He cannot take these 2 medications together. If he wants to try Anoro he will need to stop the Advair and the Spiriva. This means he will no longer be on an inhaled corticosteroid. He may experience increase cough or wheeze off the Advair. I will signed for a sample of Anoro, but he needs to stop Advair and Spiriva and call the office for any worsening symptoms. Please make sure he understands this.   Sample RX signed.

## 2018-05-24 NOTE — TELEPHONE ENCOUNTER
MELANIA Agosto.   You 21 hours ago (5:15 PM)      Please clarify. Pt is on Advair. He cannot be on Advair and Anoro.  (Routing comment

## 2018-06-02 ENCOUNTER — APPOINTMENT (OUTPATIENT)
Dept: RADIOLOGY | Facility: MEDICAL CENTER | Age: 83
DRG: 871 | End: 2018-06-02
Attending: EMERGENCY MEDICINE
Payer: MEDICARE

## 2018-06-02 ENCOUNTER — HOSPITAL ENCOUNTER (INPATIENT)
Facility: MEDICAL CENTER | Age: 83
LOS: 4 days | DRG: 871 | End: 2018-06-06
Attending: EMERGENCY MEDICINE | Admitting: HOSPITALIST
Payer: MEDICARE

## 2018-06-02 DIAGNOSIS — J44.1 ACUTE EXACERBATION OF CHRONIC OBSTRUCTIVE PULMONARY DISEASE (COPD) (HCC): ICD-10-CM

## 2018-06-02 DIAGNOSIS — R79.89 ELEVATED LACTIC ACID LEVEL: ICD-10-CM

## 2018-06-02 DIAGNOSIS — J44.1 COPD WITH ACUTE EXACERBATION (HCC): ICD-10-CM

## 2018-06-02 DIAGNOSIS — I24.9 ACS (ACUTE CORONARY SYNDROME) (HCC): ICD-10-CM

## 2018-06-02 LAB
ALBUMIN SERPL BCP-MCNC: 4.4 G/DL (ref 3.2–4.9)
ALBUMIN/GLOB SERPL: 1.6 G/DL
ALP SERPL-CCNC: 40 U/L (ref 30–99)
ALT SERPL-CCNC: 42 U/L (ref 2–50)
ANION GAP SERPL CALC-SCNC: 9 MMOL/L (ref 0–11.9)
APTT PPP: 29.3 SEC (ref 24.7–36)
AST SERPL-CCNC: 46 U/L (ref 12–45)
BASOPHILS # BLD AUTO: 0.2 % (ref 0–1.8)
BASOPHILS # BLD: 0.02 K/UL (ref 0–0.12)
BILIRUB SERPL-MCNC: 0.6 MG/DL (ref 0.1–1.5)
BNP SERPL-MCNC: 670 PG/ML (ref 0–100)
BUN SERPL-MCNC: 28 MG/DL (ref 8–22)
CALCIUM SERPL-MCNC: 9.5 MG/DL (ref 8.5–10.5)
CHLORIDE SERPL-SCNC: 92 MMOL/L (ref 96–112)
CO2 SERPL-SCNC: 38 MMOL/L (ref 20–33)
CREAT SERPL-MCNC: 0.98 MG/DL (ref 0.5–1.4)
DEPRECATED D DIMER PPP IA-ACNC: 1230 NG/ML(D-DU)
EKG IMPRESSION: NORMAL
EOSINOPHIL # BLD AUTO: 0 K/UL (ref 0–0.51)
EOSINOPHIL NFR BLD: 0 % (ref 0–6.9)
ERYTHROCYTE [DISTWIDTH] IN BLOOD BY AUTOMATED COUNT: 49.1 FL (ref 35.9–50)
GLOBULIN SER CALC-MCNC: 2.7 G/DL (ref 1.9–3.5)
GLUCOSE SERPL-MCNC: 132 MG/DL (ref 65–99)
HCT VFR BLD AUTO: 45.4 % (ref 42–52)
HGB BLD-MCNC: 13.7 G/DL (ref 14–18)
IMM GRANULOCYTES # BLD AUTO: 0.03 K/UL (ref 0–0.11)
IMM GRANULOCYTES NFR BLD AUTO: 0.3 % (ref 0–0.9)
INR PPP: 1.13 (ref 0.87–1.13)
LACTATE BLD-SCNC: 1.7 MMOL/L (ref 0.5–2)
LACTATE BLD-SCNC: 2.5 MMOL/L (ref 0.5–2)
LYMPHOCYTES # BLD AUTO: 0.49 K/UL (ref 1–4.8)
LYMPHOCYTES NFR BLD: 5.4 % (ref 22–41)
MCH RBC QN AUTO: 31.2 PG (ref 27–33)
MCHC RBC AUTO-ENTMCNC: 30.2 G/DL (ref 33.7–35.3)
MCV RBC AUTO: 103.4 FL (ref 81.4–97.8)
MONOCYTES # BLD AUTO: 0.84 K/UL (ref 0–0.85)
MONOCYTES NFR BLD AUTO: 9.3 % (ref 0–13.4)
NEUTROPHILS # BLD AUTO: 7.62 K/UL (ref 1.82–7.42)
NEUTROPHILS NFR BLD: 84.8 % (ref 44–72)
NRBC # BLD AUTO: 0 K/UL
NRBC BLD-RTO: 0 /100 WBC
PLATELET # BLD AUTO: 178 K/UL (ref 164–446)
PMV BLD AUTO: 9.9 FL (ref 9–12.9)
POTASSIUM SERPL-SCNC: 5.1 MMOL/L (ref 3.6–5.5)
PROCALCITONIN SERPL-MCNC: <0.05 NG/ML
PROT SERPL-MCNC: 7.1 G/DL (ref 6–8.2)
PROTHROMBIN TIME: 14.2 SEC (ref 12–14.6)
RBC # BLD AUTO: 4.39 M/UL (ref 4.7–6.1)
SODIUM SERPL-SCNC: 139 MMOL/L (ref 135–145)
TROPONIN I SERPL-MCNC: 1.23 NG/ML (ref 0–0.04)
TSH SERPL DL<=0.005 MIU/L-ACNC: 0.69 UIU/ML (ref 0.38–5.33)
WBC # BLD AUTO: 9 K/UL (ref 4.8–10.8)

## 2018-06-02 PROCEDURE — 87040 BLOOD CULTURE FOR BACTERIA: CPT | Mod: 91

## 2018-06-02 PROCEDURE — 85730 THROMBOPLASTIN TIME PARTIAL: CPT

## 2018-06-02 PROCEDURE — 700105 HCHG RX REV CODE 258: Performed by: EMERGENCY MEDICINE

## 2018-06-02 PROCEDURE — 770020 HCHG ROOM/CARE - TELE (206)

## 2018-06-02 PROCEDURE — 83880 ASSAY OF NATRIURETIC PEPTIDE: CPT

## 2018-06-02 PROCEDURE — 85610 PROTHROMBIN TIME: CPT

## 2018-06-02 PROCEDURE — 83605 ASSAY OF LACTIC ACID: CPT | Mod: 91

## 2018-06-02 PROCEDURE — 84145 PROCALCITONIN (PCT): CPT

## 2018-06-02 PROCEDURE — 700102 HCHG RX REV CODE 250 W/ 637 OVERRIDE(OP): Performed by: EMERGENCY MEDICINE

## 2018-06-02 PROCEDURE — 84443 ASSAY THYROID STIM HORMONE: CPT

## 2018-06-02 PROCEDURE — 700111 HCHG RX REV CODE 636 W/ 250 OVERRIDE (IP): Performed by: EMERGENCY MEDICINE

## 2018-06-02 PROCEDURE — 80053 COMPREHEN METABOLIC PANEL: CPT

## 2018-06-02 PROCEDURE — 700101 HCHG RX REV CODE 250: Performed by: EMERGENCY MEDICINE

## 2018-06-02 PROCEDURE — 93005 ELECTROCARDIOGRAM TRACING: CPT | Performed by: EMERGENCY MEDICINE

## 2018-06-02 PROCEDURE — 85379 FIBRIN DEGRADATION QUANT: CPT

## 2018-06-02 PROCEDURE — 71045 X-RAY EXAM CHEST 1 VIEW: CPT

## 2018-06-02 PROCEDURE — 36415 COLL VENOUS BLD VENIPUNCTURE: CPT

## 2018-06-02 PROCEDURE — 85025 COMPLETE CBC W/AUTO DIFF WBC: CPT

## 2018-06-02 PROCEDURE — 700105 HCHG RX REV CODE 258: Performed by: HOSPITALIST

## 2018-06-02 PROCEDURE — 99285 EMERGENCY DEPT VISIT HI MDM: CPT

## 2018-06-02 PROCEDURE — 700102 HCHG RX REV CODE 250 W/ 637 OVERRIDE(OP): Performed by: HOSPITALIST

## 2018-06-02 PROCEDURE — A9270 NON-COVERED ITEM OR SERVICE: HCPCS | Performed by: EMERGENCY MEDICINE

## 2018-06-02 PROCEDURE — A9270 NON-COVERED ITEM OR SERVICE: HCPCS | Performed by: HOSPITALIST

## 2018-06-02 PROCEDURE — 84484 ASSAY OF TROPONIN QUANT: CPT

## 2018-06-02 PROCEDURE — 96365 THER/PROPH/DIAG IV INF INIT: CPT

## 2018-06-02 PROCEDURE — 93005 ELECTROCARDIOGRAM TRACING: CPT

## 2018-06-02 PROCEDURE — 94760 N-INVAS EAR/PLS OXIMETRY 1: CPT

## 2018-06-02 PROCEDURE — 94640 AIRWAY INHALATION TREATMENT: CPT

## 2018-06-02 PROCEDURE — 99223 1ST HOSP IP/OBS HIGH 75: CPT | Mod: AI | Performed by: HOSPITALIST

## 2018-06-02 PROCEDURE — 700111 HCHG RX REV CODE 636 W/ 250 OVERRIDE (IP): Performed by: HOSPITALIST

## 2018-06-02 PROCEDURE — 96375 TX/PRO/DX INJ NEW DRUG ADDON: CPT

## 2018-06-02 RX ORDER — IPRATROPIUM BROMIDE AND ALBUTEROL SULFATE 2.5; .5 MG/3ML; MG/3ML
3 SOLUTION RESPIRATORY (INHALATION)
Status: DISCONTINUED | OUTPATIENT
Start: 2018-06-02 | End: 2018-06-02

## 2018-06-02 RX ORDER — CEFTRIAXONE 1 G/1
1 INJECTION, POWDER, FOR SOLUTION INTRAMUSCULAR; INTRAVENOUS ONCE
Status: DISCONTINUED | OUTPATIENT
Start: 2018-06-02 | End: 2018-06-02

## 2018-06-02 RX ORDER — IPRATROPIUM BROMIDE AND ALBUTEROL SULFATE 2.5; .5 MG/3ML; MG/3ML
3 SOLUTION RESPIRATORY (INHALATION)
Status: COMPLETED | OUTPATIENT
Start: 2018-06-02 | End: 2018-06-02

## 2018-06-02 RX ORDER — AZITHROMYCIN 500 MG/1
500 INJECTION, POWDER, LYOPHILIZED, FOR SOLUTION INTRAVENOUS ONCE
Status: COMPLETED | OUTPATIENT
Start: 2018-06-02 | End: 2018-06-02

## 2018-06-02 RX ORDER — SODIUM CHLORIDE 9 MG/ML
500 INJECTION, SOLUTION INTRAVENOUS
Status: DISCONTINUED | OUTPATIENT
Start: 2018-06-02 | End: 2018-06-06 | Stop reason: HOSPADM

## 2018-06-02 RX ORDER — DEXAMETHASONE SODIUM PHOSPHATE 4 MG/ML
10 INJECTION, SOLUTION INTRA-ARTICULAR; INTRALESIONAL; INTRAMUSCULAR; INTRAVENOUS; SOFT TISSUE ONCE
Status: COMPLETED | OUTPATIENT
Start: 2018-06-02 | End: 2018-06-02

## 2018-06-02 RX ORDER — METHYLPREDNISOLONE SODIUM SUCCINATE 40 MG/ML
40 INJECTION, POWDER, LYOPHILIZED, FOR SOLUTION INTRAMUSCULAR; INTRAVENOUS EVERY 6 HOURS
Status: DISCONTINUED | OUTPATIENT
Start: 2018-06-02 | End: 2018-06-02

## 2018-06-02 RX ORDER — ACETAMINOPHEN 325 MG/1
650 TABLET ORAL EVERY 6 HOURS PRN
Status: DISCONTINUED | OUTPATIENT
Start: 2018-06-02 | End: 2018-06-06 | Stop reason: HOSPADM

## 2018-06-02 RX ORDER — SODIUM CHLORIDE 9 MG/ML
1000 INJECTION, SOLUTION INTRAVENOUS ONCE
Status: COMPLETED | OUTPATIENT
Start: 2018-06-02 | End: 2018-06-02

## 2018-06-02 RX ORDER — BUDESONIDE AND FORMOTEROL FUMARATE DIHYDRATE 160; 4.5 UG/1; UG/1
2 AEROSOL RESPIRATORY (INHALATION) 2 TIMES DAILY
Status: DISCONTINUED | OUTPATIENT
Start: 2018-06-02 | End: 2018-06-03

## 2018-06-02 RX ORDER — AMOXICILLIN 250 MG
2 CAPSULE ORAL 2 TIMES DAILY
Status: DISCONTINUED | OUTPATIENT
Start: 2018-06-02 | End: 2018-06-06 | Stop reason: HOSPADM

## 2018-06-02 RX ORDER — TIOTROPIUM BROMIDE 18 UG/1
1 CAPSULE ORAL; RESPIRATORY (INHALATION) DAILY
Status: DISCONTINUED | OUTPATIENT
Start: 2018-06-03 | End: 2018-06-03

## 2018-06-02 RX ORDER — AZITHROMYCIN 250 MG/1
500 TABLET, FILM COATED ORAL ONCE
Status: DISCONTINUED | OUTPATIENT
Start: 2018-06-02 | End: 2018-06-02

## 2018-06-02 RX ORDER — SODIUM CHLORIDE 9 MG/ML
30 INJECTION, SOLUTION INTRAVENOUS
Status: DISCONTINUED | OUTPATIENT
Start: 2018-06-02 | End: 2018-06-05

## 2018-06-02 RX ORDER — BISACODYL 10 MG
10 SUPPOSITORY, RECTAL RECTAL
Status: DISCONTINUED | OUTPATIENT
Start: 2018-06-02 | End: 2018-06-06 | Stop reason: HOSPADM

## 2018-06-02 RX ORDER — POLYETHYLENE GLYCOL 3350 17 G/17G
1 POWDER, FOR SOLUTION ORAL
Status: DISCONTINUED | OUTPATIENT
Start: 2018-06-02 | End: 2018-06-06 | Stop reason: HOSPADM

## 2018-06-02 RX ORDER — ONDANSETRON 4 MG/1
4 TABLET, ORALLY DISINTEGRATING ORAL EVERY 4 HOURS PRN
Status: DISCONTINUED | OUTPATIENT
Start: 2018-06-02 | End: 2018-06-06 | Stop reason: HOSPADM

## 2018-06-02 RX ORDER — ALBUTEROL SULFATE 90 UG/1
2 AEROSOL, METERED RESPIRATORY (INHALATION) EVERY 6 HOURS PRN
Status: DISCONTINUED | OUTPATIENT
Start: 2018-06-02 | End: 2018-06-06 | Stop reason: HOSPADM

## 2018-06-02 RX ORDER — ALBUTEROL SULFATE 90 UG/1
2 AEROSOL, METERED RESPIRATORY (INHALATION) EVERY 6 HOURS PRN
COMMUNITY
End: 2020-04-09

## 2018-06-02 RX ORDER — METHYLPREDNISOLONE SODIUM SUCCINATE 125 MG/2ML
62.5 INJECTION, POWDER, LYOPHILIZED, FOR SOLUTION INTRAMUSCULAR; INTRAVENOUS EVERY 6 HOURS
Status: DISCONTINUED | OUTPATIENT
Start: 2018-06-03 | End: 2018-06-04

## 2018-06-02 RX ORDER — ONDANSETRON 2 MG/ML
4 INJECTION INTRAMUSCULAR; INTRAVENOUS EVERY 4 HOURS PRN
Status: DISCONTINUED | OUTPATIENT
Start: 2018-06-02 | End: 2018-06-06 | Stop reason: HOSPADM

## 2018-06-02 RX ORDER — AZITHROMYCIN 250 MG/1
250 TABLET, FILM COATED ORAL DAILY
Status: DISCONTINUED | OUTPATIENT
Start: 2018-06-03 | End: 2018-06-02

## 2018-06-02 RX ORDER — LEVALBUTEROL INHALATION SOLUTION 0.63 MG/3ML
0.63 SOLUTION RESPIRATORY (INHALATION)
Status: DISCONTINUED | OUTPATIENT
Start: 2018-06-02 | End: 2018-06-03

## 2018-06-02 RX ORDER — ATORVASTATIN CALCIUM 10 MG/1
10 TABLET, FILM COATED ORAL NIGHTLY
Status: DISCONTINUED | OUTPATIENT
Start: 2018-06-02 | End: 2018-06-04

## 2018-06-02 RX ORDER — CEFTRIAXONE 1 G/1
1 INJECTION, POWDER, FOR SOLUTION INTRAMUSCULAR; INTRAVENOUS ONCE
Status: COMPLETED | OUTPATIENT
Start: 2018-06-02 | End: 2018-06-02

## 2018-06-02 RX ORDER — AZITHROMYCIN 250 MG/1
250 TABLET, FILM COATED ORAL DAILY
Status: COMPLETED | OUTPATIENT
Start: 2018-06-03 | End: 2018-06-06

## 2018-06-02 RX ORDER — TAMSULOSIN HYDROCHLORIDE 0.4 MG/1
0.4 CAPSULE ORAL
Status: DISCONTINUED | OUTPATIENT
Start: 2018-06-02 | End: 2018-06-06 | Stop reason: HOSPADM

## 2018-06-02 RX ADMIN — IPRATROPIUM BROMIDE AND ALBUTEROL SULFATE 3 ML: .5; 3 SOLUTION RESPIRATORY (INHALATION) at 18:22

## 2018-06-02 RX ADMIN — TAMSULOSIN HYDROCHLORIDE 0.4 MG: 0.4 CAPSULE ORAL at 22:40

## 2018-06-02 RX ADMIN — ATORVASTATIN CALCIUM 10 MG: 10 TABLET, FILM COATED ORAL at 22:40

## 2018-06-02 RX ADMIN — ASPIRIN 81 MG: 81 TABLET, COATED ORAL at 22:40

## 2018-06-02 RX ADMIN — AZITHROMYCIN MONOHYDRATE 500 MG: 500 INJECTION, POWDER, LYOPHILIZED, FOR SOLUTION INTRAVENOUS at 18:37

## 2018-06-02 RX ADMIN — SODIUM CHLORIDE 1000 ML: 9 INJECTION, SOLUTION INTRAVENOUS at 18:15

## 2018-06-02 RX ADMIN — DEXAMETHASONE SODIUM PHOSPHATE 10 MG: 4 INJECTION, SOLUTION INTRAMUSCULAR; INTRAVENOUS at 18:33

## 2018-06-02 RX ADMIN — ASPIRIN 325 MG: 325 TABLET, DELAYED RELEASE ORAL at 19:06

## 2018-06-02 RX ADMIN — ENOXAPARIN SODIUM 40 MG: 100 INJECTION SUBCUTANEOUS at 22:41

## 2018-06-02 RX ADMIN — CEFTRIAXONE SODIUM 1 G: 1 INJECTION, POWDER, FOR SOLUTION INTRAMUSCULAR; INTRAVENOUS at 18:35

## 2018-06-02 RX ADMIN — AMPICILLIN SODIUM AND SULBACTAM SODIUM 3 G: 2; 1 INJECTION, POWDER, FOR SOLUTION INTRAMUSCULAR; INTRAVENOUS at 22:50

## 2018-06-02 RX ADMIN — METHYLPREDNISOLONE SODIUM SUCCINATE 62.5 MG: 125 INJECTION, POWDER, FOR SOLUTION INTRAMUSCULAR; INTRAVENOUS at 23:41

## 2018-06-02 ASSESSMENT — PATIENT HEALTH QUESTIONNAIRE - PHQ9
2. FEELING DOWN, DEPRESSED, IRRITABLE, OR HOPELESS: NOT AT ALL
SUM OF ALL RESPONSES TO PHQ9 QUESTIONS 1 AND 2: 0
1. LITTLE INTEREST OR PLEASURE IN DOING THINGS: NOT AT ALL

## 2018-06-02 ASSESSMENT — COGNITIVE AND FUNCTIONAL STATUS - GENERAL
SUGGESTED CMS G CODE MODIFIER MOBILITY: CJ
MOBILITY SCORE: 22
WALKING IN HOSPITAL ROOM: A LITTLE
DAILY ACTIVITIY SCORE: 24
SUGGESTED CMS G CODE MODIFIER DAILY ACTIVITY: CH
CLIMB 3 TO 5 STEPS WITH RAILING: A LITTLE

## 2018-06-02 ASSESSMENT — LIFESTYLE VARIABLES
EVER_SMOKED: YES
EVER_SMOKED: YES
CONSUMPTION TOTAL: NEGATIVE
HOW MANY TIMES IN THE PAST YEAR HAVE YOU HAD 5 OR MORE DRINKS IN A DAY: 0
TOTAL SCORE: 0
HAVE YOU EVER FELT YOU SHOULD CUT DOWN ON YOUR DRINKING: NO
TOTAL SCORE: 0
TOTAL SCORE: 0
EVER HAD A DRINK FIRST THING IN THE MORNING TO STEADY YOUR NERVES TO GET RID OF A HANGOVER: NO
EVER FELT BAD OR GUILTY ABOUT YOUR DRINKING: NO
ON A TYPICAL DAY WHEN YOU DRINK ALCOHOL HOW MANY DRINKS DO YOU HAVE: .5
HAVE PEOPLE ANNOYED YOU BY CRITICIZING YOUR DRINKING: NO
ALCOHOL_USE: YES
AVERAGE NUMBER OF DAYS PER WEEK YOU HAVE A DRINK CONTAINING ALCOHOL: 3

## 2018-06-02 ASSESSMENT — COPD QUESTIONNAIRES
COPD SCREENING SCORE: 5
DURING THE PAST 4 WEEKS HOW MUCH DID YOU FEEL SHORT OF BREATH: MOST  OR ALL OF THE TIME
DO YOU EVER COUGH UP ANY MUCUS OR PHLEGM?: NO/ONLY WITH OCCASIONAL COLDS OR INFECTIONS
HAVE YOU SMOKED AT LEAST 100 CIGARETTES IN YOUR ENTIRE LIFE: NO/DON'T KNOW

## 2018-06-02 ASSESSMENT — PAIN SCALES - GENERAL: PAINLEVEL_OUTOF10: 0

## 2018-06-03 ENCOUNTER — PATIENT OUTREACH (OUTPATIENT)
Dept: CARDIOLOGY | Facility: MEDICAL CENTER | Age: 83
End: 2018-06-03

## 2018-06-03 ENCOUNTER — APPOINTMENT (OUTPATIENT)
Dept: RADIOLOGY | Facility: MEDICAL CENTER | Age: 83
DRG: 871 | End: 2018-06-03
Attending: HOSPITALIST
Payer: MEDICARE

## 2018-06-03 PROBLEM — E87.20 LACTIC ACIDOSIS: Status: ACTIVE | Noted: 2018-06-03

## 2018-06-03 PROBLEM — J44.1 COPD WITH ACUTE EXACERBATION (HCC): Status: ACTIVE | Noted: 2018-06-03

## 2018-06-03 PROBLEM — R79.89 TROPONIN LEVEL ELEVATED: Status: ACTIVE | Noted: 2018-06-03

## 2018-06-03 PROBLEM — J96.21 ACUTE ON CHRONIC RESPIRATORY FAILURE WITH HYPOXIA (HCC): Status: ACTIVE | Noted: 2018-06-03

## 2018-06-03 PROBLEM — A41.9 SEPSIS (HCC): Status: ACTIVE | Noted: 2018-06-03

## 2018-06-03 PROBLEM — I95.9 SEPSIS ASSOCIATED HYPOTENSION (HCC): Status: ACTIVE | Noted: 2018-06-03

## 2018-06-03 LAB
ANION GAP SERPL CALC-SCNC: 5 MMOL/L (ref 0–11.9)
ANISOCYTOSIS BLD QL SMEAR: ABNORMAL
BASOPHILS # BLD AUTO: 0.2 % (ref 0–1.8)
BASOPHILS # BLD: 0.01 K/UL (ref 0–0.12)
BNP SERPL-MCNC: 997 PG/ML (ref 0–100)
BUN SERPL-MCNC: 28 MG/DL (ref 8–22)
CALCIUM SERPL-MCNC: 9.3 MG/DL (ref 8.5–10.5)
CHLORIDE SERPL-SCNC: 94 MMOL/L (ref 96–112)
CHOLEST SERPL-MCNC: 203 MG/DL (ref 100–199)
CO2 SERPL-SCNC: 40 MMOL/L (ref 20–33)
COMMENT 1642: NORMAL
CREAT SERPL-MCNC: 0.72 MG/DL (ref 0.5–1.4)
EKG IMPRESSION: NORMAL
EOSINOPHIL # BLD AUTO: 0 K/UL (ref 0–0.51)
EOSINOPHIL NFR BLD: 0 % (ref 0–6.9)
ERYTHROCYTE [DISTWIDTH] IN BLOOD BY AUTOMATED COUNT: 49.4 FL (ref 35.9–50)
GLUCOSE SERPL-MCNC: 140 MG/DL (ref 65–99)
HCT VFR BLD AUTO: 43.7 % (ref 42–52)
HDLC SERPL-MCNC: 92 MG/DL
HGB BLD-MCNC: 12.8 G/DL (ref 14–18)
IMM GRANULOCYTES # BLD AUTO: 0.04 K/UL (ref 0–0.11)
IMM GRANULOCYTES NFR BLD AUTO: 0.6 % (ref 0–0.9)
LDLC SERPL CALC-MCNC: 100 MG/DL
LV EJECT FRACT  99904: 55
LV EJECT FRACT MOD 2C 99903: 49.86
LV EJECT FRACT MOD 4C 99902: 60.25
LV EJECT FRACT MOD BP 99901: 55.24
LYMPHOCYTES # BLD AUTO: 0.19 K/UL (ref 1–4.8)
LYMPHOCYTES NFR BLD: 3 % (ref 22–41)
MACROCYTES BLD QL SMEAR: ABNORMAL
MCH RBC QN AUTO: 30.9 PG (ref 27–33)
MCHC RBC AUTO-ENTMCNC: 29.3 G/DL (ref 33.7–35.3)
MCV RBC AUTO: 105.6 FL (ref 81.4–97.8)
MONOCYTES # BLD AUTO: 0.02 K/UL (ref 0–0.85)
MONOCYTES NFR BLD AUTO: 0.3 % (ref 0–13.4)
MORPHOLOGY BLD-IMP: NORMAL
NEUTROPHILS # BLD AUTO: 6 K/UL (ref 1.82–7.42)
NEUTROPHILS NFR BLD: 95.9 % (ref 44–72)
NRBC # BLD AUTO: 0 K/UL
NRBC BLD-RTO: 0 /100 WBC
PLATELET # BLD AUTO: 152 K/UL (ref 164–446)
PLATELET BLD QL SMEAR: NORMAL
PMV BLD AUTO: 10.3 FL (ref 9–12.9)
POTASSIUM SERPL-SCNC: 5.5 MMOL/L (ref 3.6–5.5)
RBC # BLD AUTO: 4.14 M/UL (ref 4.7–6.1)
RBC BLD AUTO: PRESENT
SODIUM SERPL-SCNC: 139 MMOL/L (ref 135–145)
TRIGL SERPL-MCNC: 54 MG/DL (ref 0–149)
TROPONIN I SERPL-MCNC: 2.44 NG/ML (ref 0–0.04)
TROPONIN I SERPL-MCNC: 2.52 NG/ML (ref 0–0.04)
WBC # BLD AUTO: 6.3 K/UL (ref 4.8–10.8)

## 2018-06-03 PROCEDURE — 71275 CT ANGIOGRAPHY CHEST: CPT

## 2018-06-03 PROCEDURE — 36415 COLL VENOUS BLD VENIPUNCTURE: CPT

## 2018-06-03 PROCEDURE — 93010 ELECTROCARDIOGRAM REPORT: CPT | Performed by: INTERNAL MEDICINE

## 2018-06-03 PROCEDURE — 94640 AIRWAY INHALATION TREATMENT: CPT

## 2018-06-03 PROCEDURE — 700105 HCHG RX REV CODE 258: Performed by: HOSPITALIST

## 2018-06-03 PROCEDURE — A9270 NON-COVERED ITEM OR SERVICE: HCPCS | Performed by: HOSPITALIST

## 2018-06-03 PROCEDURE — 94760 N-INVAS EAR/PLS OXIMETRY 1: CPT

## 2018-06-03 PROCEDURE — 83880 ASSAY OF NATRIURETIC PEPTIDE: CPT

## 2018-06-03 PROCEDURE — 700102 HCHG RX REV CODE 250 W/ 637 OVERRIDE(OP): Performed by: INTERNAL MEDICINE

## 2018-06-03 PROCEDURE — 80061 LIPID PANEL: CPT

## 2018-06-03 PROCEDURE — 80048 BASIC METABOLIC PNL TOTAL CA: CPT

## 2018-06-03 PROCEDURE — 94668 MNPJ CHEST WALL SBSQ: CPT

## 2018-06-03 PROCEDURE — 93005 ELECTROCARDIOGRAM TRACING: CPT | Performed by: INTERNAL MEDICINE

## 2018-06-03 PROCEDURE — 93306 TTE W/DOPPLER COMPLETE: CPT

## 2018-06-03 PROCEDURE — 700102 HCHG RX REV CODE 250 W/ 637 OVERRIDE(OP): Performed by: HOSPITALIST

## 2018-06-03 PROCEDURE — A9270 NON-COVERED ITEM OR SERVICE: HCPCS | Performed by: INTERNAL MEDICINE

## 2018-06-03 PROCEDURE — 99233 SBSQ HOSP IP/OBS HIGH 50: CPT | Performed by: HOSPITALIST

## 2018-06-03 PROCEDURE — 94667 MNPJ CHEST WALL 1ST: CPT

## 2018-06-03 PROCEDURE — 700101 HCHG RX REV CODE 250: Performed by: HOSPITALIST

## 2018-06-03 PROCEDURE — 770020 HCHG ROOM/CARE - TELE (206)

## 2018-06-03 PROCEDURE — 93306 TTE W/DOPPLER COMPLETE: CPT | Mod: 26 | Performed by: INTERNAL MEDICINE

## 2018-06-03 PROCEDURE — 700111 HCHG RX REV CODE 636 W/ 250 OVERRIDE (IP): Performed by: HOSPITALIST

## 2018-06-03 PROCEDURE — 700111 HCHG RX REV CODE 636 W/ 250 OVERRIDE (IP): Performed by: INTERNAL MEDICINE

## 2018-06-03 PROCEDURE — 84484 ASSAY OF TROPONIN QUANT: CPT

## 2018-06-03 PROCEDURE — 85025 COMPLETE CBC W/AUTO DIFF WBC: CPT

## 2018-06-03 RX ORDER — LEVALBUTEROL INHALATION SOLUTION 0.63 MG/3ML
0.63 SOLUTION RESPIRATORY (INHALATION)
Status: DISCONTINUED | OUTPATIENT
Start: 2018-06-03 | End: 2018-06-04

## 2018-06-03 RX ORDER — CLOPIDOGREL BISULFATE 75 MG/1
300 TABLET ORAL ONCE
Status: COMPLETED | OUTPATIENT
Start: 2018-06-03 | End: 2018-06-03

## 2018-06-03 RX ORDER — CLOPIDOGREL BISULFATE 75 MG/1
75 TABLET ORAL DAILY
Status: DISCONTINUED | OUTPATIENT
Start: 2018-06-04 | End: 2018-06-06 | Stop reason: HOSPADM

## 2018-06-03 RX ADMIN — ENOXAPARIN SODIUM 40 MG: 100 INJECTION SUBCUTANEOUS at 05:29

## 2018-06-03 RX ADMIN — TIOTROPIUM BROMIDE 1 CAPSULE: 18 CAPSULE ORAL; RESPIRATORY (INHALATION) at 05:20

## 2018-06-03 RX ADMIN — ATORVASTATIN CALCIUM 10 MG: 10 TABLET, FILM COATED ORAL at 17:51

## 2018-06-03 RX ADMIN — ENOXAPARIN SODIUM 60 MG: 100 INJECTION SUBCUTANEOUS at 15:15

## 2018-06-03 RX ADMIN — CLOPIDOGREL 300 MG: 75 TABLET, FILM COATED ORAL at 17:53

## 2018-06-03 RX ADMIN — LEVALBUTEROL HYDROCHLORIDE 0.63 MG: 0.63 SOLUTION RESPIRATORY (INHALATION) at 19:06

## 2018-06-03 RX ADMIN — TAMSULOSIN HYDROCHLORIDE 0.4 MG: 0.4 CAPSULE ORAL at 17:51

## 2018-06-03 RX ADMIN — LEVALBUTEROL HYDROCHLORIDE 0.63 MG: 0.63 SOLUTION RESPIRATORY (INHALATION) at 22:33

## 2018-06-03 RX ADMIN — METHYLPREDNISOLONE SODIUM SUCCINATE 62.5 MG: 125 INJECTION, POWDER, FOR SOLUTION INTRAMUSCULAR; INTRAVENOUS at 05:30

## 2018-06-03 RX ADMIN — BUDESONIDE AND FORMOTEROL FUMARATE DIHYDRATE 2 PUFF: 160; 4.5 AEROSOL RESPIRATORY (INHALATION) at 05:21

## 2018-06-03 RX ADMIN — ASPIRIN 81 MG: 81 TABLET, COATED ORAL at 05:16

## 2018-06-03 RX ADMIN — ENOXAPARIN SODIUM 60 MG: 100 INJECTION SUBCUTANEOUS at 22:36

## 2018-06-03 RX ADMIN — METHYLPREDNISOLONE SODIUM SUCCINATE 62.5 MG: 125 INJECTION, POWDER, FOR SOLUTION INTRAMUSCULAR; INTRAVENOUS at 13:00

## 2018-06-03 RX ADMIN — UMECLIDINIUM BROMIDE AND VILANTEROL TRIFENATATE 1 PUFF: 62.5; 25 POWDER RESPIRATORY (INHALATION) at 05:23

## 2018-06-03 RX ADMIN — AMPICILLIN SODIUM AND SULBACTAM SODIUM 3 G: 2; 1 INJECTION, POWDER, FOR SOLUTION INTRAMUSCULAR; INTRAVENOUS at 05:43

## 2018-06-03 RX ADMIN — LEVALBUTEROL HYDROCHLORIDE 0.63 MG: 0.63 SOLUTION RESPIRATORY (INHALATION) at 15:17

## 2018-06-03 RX ADMIN — STANDARDIZED SENNA CONCENTRATE AND DOCUSATE SODIUM 2 TABLET: 8.6; 5 TABLET, FILM COATED ORAL at 05:15

## 2018-06-03 RX ADMIN — AMPICILLIN SODIUM AND SULBACTAM SODIUM 3 G: 2; 1 INJECTION, POWDER, FOR SOLUTION INTRAMUSCULAR; INTRAVENOUS at 13:00

## 2018-06-03 RX ADMIN — STANDARDIZED SENNA CONCENTRATE AND DOCUSATE SODIUM 2 TABLET: 8.6; 5 TABLET, FILM COATED ORAL at 17:51

## 2018-06-03 RX ADMIN — METHYLPREDNISOLONE SODIUM SUCCINATE 62.5 MG: 125 INJECTION, POWDER, FOR SOLUTION INTRAMUSCULAR; INTRAVENOUS at 17:50

## 2018-06-03 RX ADMIN — AZITHROMYCIN 250 MG: 250 TABLET, FILM COATED ORAL at 05:17

## 2018-06-03 ASSESSMENT — PAIN SCALES - GENERAL
PAINLEVEL_OUTOF10: 0

## 2018-06-03 ASSESSMENT — ENCOUNTER SYMPTOMS
CONSTIPATION: 0
HEMOPTYSIS: 0
PALPITATIONS: 0
VOMITING: 0
SPUTUM PRODUCTION: 0
DIZZINESS: 0
LOSS OF CONSCIOUSNESS: 0
HEADACHES: 0
WHEEZING: 1
DIARRHEA: 0
EYES NEGATIVE: 1
DEPRESSION: 0
SHORTNESS OF BREATH: 1
SPUTUM PRODUCTION: 1
CLAUDICATION: 0
NECK PAIN: 0
WEAKNESS: 0
NAUSEA: 0
PND: 0
ABDOMINAL PAIN: 0
EYE DISCHARGE: 0
BACK PAIN: 0
CONSTITUTIONAL NEGATIVE: 1
BRUISES/BLEEDS EASILY: 0
EYE PAIN: 0
COUGH: 1
COUGH: 0
SENSORY CHANGE: 0
MYALGIAS: 0
CHILLS: 0
ORTHOPNEA: 0
SORE THROAT: 0
MUSCULOSKELETAL NEGATIVE: 1
GASTROINTESTINAL NEGATIVE: 1
NEUROLOGICAL NEGATIVE: 1
FEVER: 0
SPEECH CHANGE: 0
DIAPHORESIS: 0
FOCAL WEAKNESS: 0

## 2018-06-03 ASSESSMENT — LIFESTYLE VARIABLES: SUBSTANCE_ABUSE: 0

## 2018-06-03 NOTE — ED NOTES
Med rec complete per Pt and Rx bottles   Rx bottles returned to family at bedside  Allergies reviewed  No ABX in last 30 days

## 2018-06-03 NOTE — CONSULTS
Reason for Consult:  Asked by Dr Samaniego to see this patient with positive troponin  Patient's PCP: Barbara Wilkins M.D.    CC:   Chief Complaint   Patient presents with   • Shortness of Breath     x 1-2 weeks, uses 6L via nasal cannula on condenser at home   • Weakness     x4-5 days       HPI: This is a very pleasant 83-year-old gentleman who follows with Dr. Diego Bear in our group for cardiovascular risk evaluation the setting of advanced COPD who presents with COPD exacerbation was found to have positive troponin and had a negative CT scan for pulmonary embolism this afternoon.  His echocardiogram is stable from before.  He reports progressive shortness of breath and need for increased oxygen these have improved while he has been here with treatment for COPD.    Medications / Drug list prior to admission:  No current facility-administered medications on file prior to encounter.      Current Outpatient Prescriptions on File Prior to Encounter   Medication Sig Dispense Refill   • umeclidinium-vilanterol (ANORO ELLIPTA) 62.5-25 MCG/INH AEROSOL POWDER, BREATH ACTIVATED inhaler Inhale 1 Puff by mouth every day. 1 Each 0   • lisinopril (PRINIVIL) 20 MG Tab Take 1 Tab by mouth 2 times a day. 180 Tab 3   • fluticasone-salmeterol (ADVAIR) 500-50 MCG/DOSE AEROSOL POWDER, BREATH ACTIVATED Inhale 1 Puff by mouth every 12 hours. Rinse mouth after use 1 Inhaler 5   • tiotropium (SPIRIVA) 18 MCG Cap Inhale 18 mcg by mouth every day.     • tamsulosin (FLOMAX) 0.4 MG capsule Take 1 Cap by mouth every bedtime. 90 Cap 3   • atorvastatin (LIPITOR) 10 MG TABS Take 10 mg by mouth every evening.           Current list of administered Medications:    Current Facility-Administered Medications:   •  levalbuterol (XOPENEX) 0.63 MG/3ML nebulizer solution 0.63 mg, 0.63 mg, Nebulization, Q4HRS (RT), Blanca Gaytan M.D., 0.63 mg at 06/03/18 1517  •  enoxaparin (LOVENOX) inj 60 mg, 1 mg/kg, Subcutaneous, Q12HRS, Prashant Orona M.D.  •   iohexol (OMNIPAQUE) 350 mg/mL, 70 mL, Intravenous, Once, Carmen Billingsley M.D.  •  clopidogrel (PLAVIX) tablet 300 mg, 300 mg, Oral, Once, Prashant Orona M.D.  •  [START ON 6/4/2018] clopidogrel (PLAVIX) tablet 75 mg, 75 mg, Oral, DAILY, Prashant Orona M.D.  •  albuterol inhaler 2 Puff, 2 Puff, Inhalation, Q6HRS PRN, Blanca Gaytan M.D.  •  atorvastatin (LIPITOR) tablet 10 mg, 10 mg, Oral, Nightly, Blanca Gaytan M.D., 10 mg at 06/02/18 2240  •  tamsulosin (FLOMAX) capsule 0.4 mg, 0.4 mg, Oral, QHS, Blanca Gaytan M.D., 0.4 mg at 06/02/18 2240  •  umeclidinium-vilanterol (ANORO ELLIPTA) inhaler 1 Puff, 1 Puff, Inhalation, DAILY, Blanca Gaytan M.D., 1 Puff at 06/03/18 0523  •  senna-docusate (PERICOLACE or SENOKOT S) 8.6-50 MG per tablet 2 Tab, 2 Tab, Oral, BID, 2 Tab at 06/03/18 0515 **AND** polyethylene glycol/lytes (MIRALAX) PACKET 1 Packet, 1 Packet, Oral, QDAY PRN **AND** magnesium hydroxide (MILK OF MAGNESIA) suspension 30 mL, 30 mL, Oral, QDAY PRN **AND** bisacodyl (DULCOLAX) suppository 10 mg, 10 mg, Rectal, QDAY PRN, Blanca Gaytan M.D.  •  Respiratory Care per Protocol, , Nebulization, Continuous RT, Blanca Gaytan M.D.  •  ondansetron (ZOFRAN) syringe/vial injection 4 mg, 4 mg, Intravenous, Q4HRS PRN, Blanca Gaytan M.D.  •  ondansetron (ZOFRAN ODT) dispertab 4 mg, 4 mg, Oral, Q4HRS PRN, Blanca Gaytan M.D.  •  acetaminophen (TYLENOL) tablet 650 mg, 650 mg, Oral, Q6HRS PRN, Blanca Gaytan M.D.  •  aspirin EC (ECOTRIN) tablet 81 mg, 81 mg, Oral, DAILY, Blanca Gaytan M.D., 81 mg at 06/03/18 0516  •  methylPREDNISolone sod succ (SOLU-MEDROL) 125 MG injection 62.5 mg, 62.5 mg, Intravenous, Q6HRS, Blanca Gaytan M.D., 62.5 mg at 06/03/18 1300  •  NS infusion 1,503 mL, 30 mL/kg, Intravenous, Once PRN, Blanca Gaytan M.D.  •  NS (BOLUS) infusion 500 mL, 500 mL, Intravenous, Once PRN, Blanca Gaytan M.D.  •  azithromycin (ZITHROMAX) tablet 250 mg, 250 mg, Oral, DAILY, Blanca Gaytan M.D., 250 mg at 06/03/18  "0517    Past Medical History:   Diagnosis Date   • Centrilobular emphysema (HCC) 12/3/2015   • COPD (chronic obstructive pulmonary disease) (HCC)    • Dyslipidemia    • Hypertension    • Pulmonary emphysema (HCC)    • Pulmonary hypertension (HCC)        Past Surgical History:   Procedure Laterality Date   • HERNIA REPAIR  1990    right inguinal hernia        Family History   Problem Relation Age of Onset   • Heart Disease Father    • Heart Attack Brother    • Other Brother      CAD   • Cancer Brother      lung, other       Social History     Social History   • Marital status:      Spouse name: N/A   • Number of children: N/A   • Years of education: N/A     Occupational History   • Not on file.     Social History Main Topics   • Smoking status: Former Smoker     Quit date: 1/1/2008   • Smokeless tobacco: Never Used   • Alcohol use 1.0 oz/week     2 Cans of beer per week      Comment: week   • Drug use: No   • Sexual activity: Yes     Partners: Female     Other Topics Concern   • Not on file     Social History Narrative   • No narrative on file       ALLERGIES:  No Known Allergies    Review of systems:  A detailed review of symptoms was reviewed with patient. This is reviewed in H&P and PMH. ALL OTHERS reviewed and negative    Physical exam:  Patient Vitals for the past 24 hrs:   BP Temp Pulse Resp SpO2 Height Weight   06/03/18 1545 119/73 37.2 °C (99 °F) 93 14 96 % - -   06/03/18 1532 - - 100 20 98 % - -   06/03/18 0805 127/70 36.6 °C (97.9 °F) (!) 105 (!) 24 100 % - -   06/03/18 0700 - - (!) 104 (!) 26 - - -   06/03/18 0400 133/65 36.6 °C (97.9 °F) 96 (!) 25 100 % - -   06/03/18 0000 142/80 36.7 °C (98 °F) 96 (!) 24 100 % - -   06/02/18 2310 - - 98 (!) 28 98 % - -   06/02/18 2230 130/75 36.7 °C (98 °F) 96 (!) 25 100 % 1.676 m (5' 5.98\") 54.2 kg (119 lb 7.8 oz)   06/02/18 2002 - - (!) 101 (!) 33 100 % - -   06/02/18 1945 - - (!) 51 (!) 24 96 % - -   06/02/18 1822 - - - (!) 24 - - -   06/02/18 1800 115/67 - (!) " "113 - 99 % - -   18 1745 119/71 - (!) 110 20 100 % - -   18 1730 114/73 - (!) 113 20 99 % - -   18 1715 127/77 - (!) 118 - 99 % - -   18 1712 - 37.4 °C (99.3 °F) - 20 - - -   18 1709 - - - - - 1.676 m (5' 6\") 50.1 kg (110 lb 7.2 oz)       General: No acute distress.   EYES: no jaundice  HEENT: OP clear   Neck: No bruits No JVD.   CVS:RRR. S1 + S2. No M/R/G  Resp: Decreased breath sounds throughout no wheezing  Abdomen: Soft, NT, ND,  Skin: Grossly nothing acute no obvious rashes  Neurological: Alert, Moves all extremities, no cranial nerve defects on limited exam  Extremities: Pulse 2+ in b/l LE. noedema. No cyanosis.       Data:  Laboratory studies:  Recent Results (from the past 24 hour(s))   EKG (ER)    Collection Time: 18  5:13 PM   Result Value Ref Range    Report       [facility]    Test Date:  2018  Pt Name:    GOMEZ KERR        Department: ER  MRN:        [id number]                      Room:       [facility]  Gender:     Male                         Technician: [id number]  :        1934                   Requested By:ER TRIAGE PROTOCOL  Order #:    [id number]                    Reading MD:    Measurements  Intervals                                Axis  Rate:       115                          P:          81  AR:         166                          QRS:        25  QRSD:       82                           T:          261  QT:         308  QTc:        426    Interpretive Statements  SINUS TACHYCARDIA  CONSIDER POSTERIOR INFARCT  BORDERLINE REPOLARIZATION ABNORMALITY  No previous ECG available for comparison     BTYPE NATRIURETIC PEPTIDE    Collection Time: 18  5:23 PM   Result Value Ref Range    B Natriuretic Peptide 670 (H) 0 - 100 pg/mL   CBC WITH DIFFERENTIAL    Collection Time: 18  5:23 PM   Result Value Ref Range    WBC 9.0 4.8 - 10.8 K/uL    RBC 4.39 (L) 4.70 - 6.10 M/uL    Hemoglobin 13.7 (L) 14.0 - 18.0 " g/dL    Hematocrit 45.4 42.0 - 52.0 %    .4 (H) 81.4 - 97.8 fL    MCH 31.2 27.0 - 33.0 pg    MCHC 30.2 (L) 33.7 - 35.3 g/dL    RDW 49.1 35.9 - 50.0 fL    Platelet Count 178 164 - 446 K/uL    MPV 9.9 9.0 - 12.9 fL    Neutrophils-Polys 84.80 (H) 44.00 - 72.00 %    Lymphocytes 5.40 (L) 22.00 - 41.00 %    Monocytes 9.30 0.00 - 13.40 %    Eosinophils 0.00 0.00 - 6.90 %    Basophils 0.20 0.00 - 1.80 %    Immature Granulocytes 0.30 0.00 - 0.90 %    Nucleated RBC 0.00 /100 WBC    Neutrophils (Absolute) 7.62 (H) 1.82 - 7.42 K/uL    Lymphs (Absolute) 0.49 (L) 1.00 - 4.80 K/uL    Monos (Absolute) 0.84 0.00 - 0.85 K/uL    Eos (Absolute) 0.00 0.00 - 0.51 K/uL    Baso (Absolute) 0.02 0.00 - 0.12 K/uL    Immature Granulocytes (abs) 0.03 0.00 - 0.11 K/uL    NRBC (Absolute) 0.00 K/uL   COMP METABOLIC PANEL    Collection Time: 06/02/18  5:23 PM   Result Value Ref Range    Sodium 139 135 - 145 mmol/L    Potassium 5.1 3.6 - 5.5 mmol/L    Chloride 92 (L) 96 - 112 mmol/L    Co2 38 (H) 20 - 33 mmol/L    Anion Gap 9.0 0.0 - 11.9    Glucose 132 (H) 65 - 99 mg/dL    Bun 28 (H) 8 - 22 mg/dL    Creatinine 0.98 0.50 - 1.40 mg/dL    Calcium 9.5 8.5 - 10.5 mg/dL    AST(SGOT) 46 (H) 12 - 45 U/L    ALT(SGPT) 42 2 - 50 U/L    Alkaline Phosphatase 40 30 - 99 U/L    Total Bilirubin 0.6 0.1 - 1.5 mg/dL    Albumin 4.4 3.2 - 4.9 g/dL    Total Protein 7.1 6.0 - 8.2 g/dL    Globulin 2.7 1.9 - 3.5 g/dL    A-G Ratio 1.6 g/dL   ESTIMATED GFR    Collection Time: 06/02/18  5:23 PM   Result Value Ref Range    GFR If African American >60 >60 mL/min/1.73 m 2    GFR If Non African American >60 >60 mL/min/1.73 m 2   TROPONIN    Collection Time: 06/02/18  5:23 PM   Result Value Ref Range    Troponin I 1.23 (H) 0.00 - 0.04 ng/mL   Procalcitonin    Collection Time: 06/02/18  5:23 PM   Result Value Ref Range    Procalcitonin <0.05 <0.25 ng/mL   LACTIC ACID    Collection Time: 06/02/18  6:26 PM   Result Value Ref Range    Lactic Acid 2.5 (H) 0.5 - 2.0 mmol/L    BLOOD CULTURE    Collection Time: 06/02/18  6:26 PM   Result Value Ref Range    Significant Indicator NEG     Source BLD     Site PERIPHERAL     Blood Culture       No Growth    Note: Blood cultures are incubated for 5 days and  are monitored continuously.Positive blood cultures  are called to the RN and reported as soon as  they are identified.     BLOOD CULTURE    Collection Time: 06/02/18  6:27 PM   Result Value Ref Range    Significant Indicator NEG     Source BLD     Site PERIPHERAL     Blood Culture       No Growth    Note: Blood cultures are incubated for 5 days and  are monitored continuously.Positive blood cultures  are called to the RN and reported as soon as  they are identified.     LACTIC ACID    Collection Time: 06/02/18  8:30 PM   Result Value Ref Range    Lactic Acid 1.7 0.5 - 2.0 mmol/L   TSH (Thyroid Stimulating Hormone)    Collection Time: 06/02/18  8:30 PM   Result Value Ref Range    TSH 0.690 0.380 - 5.330 uIU/mL   D-DIMER    Collection Time: 06/02/18  8:30 PM   Result Value Ref Range    D-Dimer Screen 1230 (H) <250 ng/mL(D-DU)   Prothrombin time (INR)    Collection Time: 06/02/18  8:30 PM   Result Value Ref Range    PT 14.2 12.0 - 14.6 sec    INR 1.13 0.87 - 1.13   APTT    Collection Time: 06/02/18  8:30 PM   Result Value Ref Range    APTT 29.3 24.7 - 36.0 sec   Troponin    Collection Time: 06/03/18  2:18 AM   Result Value Ref Range    Troponin I 2.44 (H) 0.00 - 0.04 ng/mL   CBC with Differential    Collection Time: 06/03/18  2:18 AM   Result Value Ref Range    WBC 6.3 4.8 - 10.8 K/uL    RBC 4.14 (L) 4.70 - 6.10 M/uL    Hemoglobin 12.8 (L) 14.0 - 18.0 g/dL    Hematocrit 43.7 42.0 - 52.0 %    .6 (H) 81.4 - 97.8 fL    MCH 30.9 27.0 - 33.0 pg    MCHC 29.3 (L) 33.7 - 35.3 g/dL    RDW 49.4 35.9 - 50.0 fL    Platelet Count 152 (L) 164 - 446 K/uL    MPV 10.3 9.0 - 12.9 fL    Neutrophils-Polys 95.90 (H) 44.00 - 72.00 %    Lymphocytes 3.00 (L) 22.00 - 41.00 %    Monocytes 0.30 0.00 - 13.40 %     Eosinophils 0.00 0.00 - 6.90 %    Basophils 0.20 0.00 - 1.80 %    Immature Granulocytes 0.60 0.00 - 0.90 %    Nucleated RBC 0.00 /100 WBC    Lymphs (Absolute) 0.19 (L) 1.00 - 4.80 K/uL    Monos (Absolute) 0.02 0.00 - 0.85 K/uL    Eos (Absolute) 0.00 0.00 - 0.51 K/uL    Baso (Absolute) 0.01 0.00 - 0.12 K/uL    Immature Granulocytes (abs) 0.04 0.00 - 0.11 K/uL    NRBC (Absolute) 0.00 K/uL    Neutrophils (Absolute) 6.00 1.82 - 7.42 K/uL    Anisocytosis 2+     Macrocytosis 2+    BType Natriuretic Peptide (BNP)    Collection Time: 06/03/18  2:18 AM   Result Value Ref Range    B Natriuretic Peptide 997 (H) 0 - 100 pg/mL   BASIC METABOLIC PANEL    Collection Time: 06/03/18  2:18 AM   Result Value Ref Range    Sodium 139 135 - 145 mmol/L    Potassium 5.5 3.6 - 5.5 mmol/L    Chloride 94 (L) 96 - 112 mmol/L    Co2 40 (H) 20 - 33 mmol/L    Glucose 140 (H) 65 - 99 mg/dL    Bun 28 (H) 8 - 22 mg/dL    Creatinine 0.72 0.50 - 1.40 mg/dL    Calcium 9.3 8.5 - 10.5 mg/dL    Anion Gap 5.0 0.0 - 11.9   LIPID PROFILE    Collection Time: 06/03/18  2:18 AM   Result Value Ref Range    Cholesterol,Tot 203 (H) 100 - 199 mg/dL    Triglycerides 54 0 - 149 mg/dL    HDL 92 >=40 mg/dL     (H) <100 mg/dL   ESTIMATED GFR    Collection Time: 06/03/18  2:18 AM   Result Value Ref Range    GFR If African American >60 >60 mL/min/1.73 m 2    GFR If Non African American >60 >60 mL/min/1.73 m 2   PERIPHERAL SMEAR REVIEW    Collection Time: 06/03/18  2:18 AM   Result Value Ref Range    Peripheral Smear Review see below    PLATELET ESTIMATE    Collection Time: 06/03/18  2:18 AM   Result Value Ref Range    Plt Estimation Decreased    MORPHOLOGY    Collection Time: 06/03/18  2:18 AM   Result Value Ref Range    RBC Morphology Present    DIFFERENTIAL COMMENT    Collection Time: 06/03/18  2:18 AM   Result Value Ref Range    Comments-Diff see below    EKG    Collection Time: 06/03/18  3:23 AM   Result Value Ref Range    Report       [facility]    Test  Date:  2018  Pt Name:    GOMEZ KERR        Department: 183  MRN:        [id number]                      Room:       T813  Gender:     Male                         Technician: TAMIKA  :        1934                   Requested By:JORGE SEXTON  Order #:    [id number]                    Reading MD: Jhonny Han MD    Measurements  Intervals                                Axis  Rate:       105                          P:          75  RI:         170                          QRS:        8  QRSD:       80                           T:          -58  QT:         363  QTc:        480    Interpretive Statements  SINUS TACHYCARDIA  RSR' IN V1 OR V2, RIGHT VCD OR RVH  NONSPECIFIC REPOL ABNORMALITY, DIFFUSE LEADS  BORDERLINE PROLONGED QT INTERVAL  CONSIDER POSTERIOR INFARCT  Compared to ECG 2018 17:13:53  NO SIGNIFICANT CHANGE      Electronically Signed On 6-3-2018 6:43:04 PDT by Jhonny Han MD     Troponin    Collection Time: 18  8:32 AM   Result Value Ref Range    Troponin I 2.52 (H) 0.00 - 0.04 ng/mL   Echocardiogram Comp W/O Cont    Collection Time: 18 11:03 AM   Result Value Ref Range    Eject.Frac. MOD BP 55.24     Eject.Frac. MOD 4C 60.25     Eject.Frac. MOD 2C 49.86     Left Ventrical Ejection Fraction 55        Imaging:  CT-CTA CHEST PULMONARY ARTERY W/ RECONS   Final Result      No evidence of pulmonary embolus.      Severe emphysema.      Atherosclerotic disease with coronary artery calcifications.      Bilateral hydronephrosis.            Echocardiogram Comp W/O Cont   Final Result      DX-CHEST-LIMITED (1 VIEW)   Final Result      1.  No evidence of acute cardiopulmonary process.      2.  COPD        Echocardiogram personally read by me shows mildly elevated pulmonary estimate similar to prior EF is normal no regional wall motion abnormality aside from abnormal septal motion possibly due to subtle bundle branch block or right heart disease      EKG : personally reviewed by  me sinus rhythm and sinus tachycardia normal axis and intervals    All pertinent features of laboratory and imaging reviewed including primary images where applicable    Assessment / Plan:  Positive troponin likely represents demand ischemia in the setting of stress from COPD exacerbation.  Will treat COPD exacerbation and start antiplatelet therapy aspirin Plavix Lovenox for 48 hours.  After he recovers from his COPD exacerbation perhaps pursue a ischemic evaluation with a PET stress test as an outpatient    Future Appointments  Date Time Provider Department Center   6/7/2018 3:00 PM PFT-RM1 [facility] None   6/7/2018 5:00 PM DOMONIQUE Agosto.P.N. [facility] None   6/21/2018 3:00 PM MELONIE العلي. [facility] None     It is my pleasure to participate in the care of Mr. Alvarez.  Please do not hesitate to contact me with questions or concerns.    Prashant Orona MD PhD [facility]  Cardiologist [facility]    6/3/2018

## 2018-06-03 NOTE — PROGRESS NOTES
2 RN skin check  Bilateral ears intact, red and blanching. Bilateral elbows intact. Coccyx intact with redness and blanching. Heels cracking, dry and flaky. Overall skin integrity intact, dry and flaky.

## 2018-06-03 NOTE — PROGRESS NOTES
Transported pt to unit with ACLS ABRAM Nguyen. Assumed care of patient, bedside report received from ABRAM Yates. Pt placed on tele box and monitor room called. Pt SR 90. Updated on POC, call light within reach and fall precautions in place. Bed locked and in lowest position. Patient instructed to call for assistance before getting out of bed. All questions answered, no other needs at this time.

## 2018-06-03 NOTE — PROGRESS NOTES
[facility] Hospitalist Progress Note    Date of Service: 6/3/2018    Chief Complaint  83 y.o. male admitted 2018 with shortness of breath and generalized malaise with acute on chronic hypoxemic respiratory failure    Interval Problem Update  States he is feeling better, has ongoing productive cough, denies cp  Ros otherwise negative  Speaking in full sentences without difficulty  States baseline oxygen requirement is 2.5 L  Denies dizziness    Consultants/Specialty  cardiology    Disposition          Review of Systems   Constitutional: Negative.    HENT: Negative.    Eyes: Negative.    Respiratory: Positive for cough, sputum production, shortness of breath and wheezing.    Cardiovascular: Negative for chest pain, palpitations, orthopnea, claudication, leg swelling and PND.   Gastrointestinal: Negative.    Genitourinary: Negative.    Musculoskeletal: Negative.    Skin: Negative.    Neurological: Negative.    Endo/Heme/Allergies: Negative.       Physical Exam  Laboratory/Imaging   Hemodynamics  Temp (24hrs), Av.8 °C (98.2 °F), Min:36.6 °C (97.9 °F), Max:37.4 °C (99.3 °F)   Temperature: 36.6 °C (97.9 °F)  Pulse  Av.1  Min: 51  Max: 118 Heart Rate (Monitored): (!) 101  Blood Pressure : 127/70, NIBP: (!) 98/58      Respiratory      Respiration: (!) 24, Pulse Oximetry: 100 %, O2 Daily Delivery Respiratory : Silicone Nasal Cannula   RR currently 18  Given By:: Mask, Work Of Breathing / Effort: Moderate;Mild  RUL Breath Sounds: Expiratory Wheezes;Diminished, RML Breath Sounds: Diminished, RLL Breath Sounds: Diminished, JEFRY Breath Sounds: Expiratory Wheezes;Diminished, LLL Breath Sounds: Diminished    Fluids  No intake or output data in the 24 hours ending 18 1031    Nutrition  Orders Placed This Encounter   Procedures   • Diet Order     Standing Status:   Standing     Number of Occurrences:   1     Order Specific Question:   Diet:     Answer:   Cardiac [6]     Physical Exam   Constitutional: He is oriented  to person, place, and time. No distress.   HENT:   Head: Normocephalic and atraumatic.   Mouth/Throat: Oropharynx is clear and moist. No oropharyngeal exudate.   Eyes: Conjunctivae are normal.   Neck: No JVD present. No tracheal deviation present.   Cardiovascular: Normal rate, regular rhythm, normal heart sounds and intact distal pulses.  Exam reveals no gallop and no friction rub.    No murmur heard.  Pulmonary/Chest: Effort normal. No stridor. No respiratory distress. He has wheezes. He has rales. He exhibits no tenderness.   Abdominal: Soft. Bowel sounds are normal. He exhibits no distension and no mass. There is no tenderness. There is no rebound and no guarding.   Musculoskeletal: Normal range of motion. He exhibits no edema, tenderness or deformity.   Neurological: He is alert and oriented to person, place, and time. He has normal reflexes. No cranial nerve deficit. He exhibits normal muscle tone. Coordination normal.   Skin: Skin is warm and dry. No rash noted. He is not diaphoretic. No erythema. No pallor.   Psychiatric: He has a normal mood and affect. His behavior is normal. Judgment and thought content normal.   Nursing note and vitals reviewed.      Recent Labs      06/02/18   1723  06/03/18   0218   WBC  9.0  6.3   RBC  4.39*  4.14*   HEMOGLOBIN  13.7*  12.8*   HEMATOCRIT  45.4  43.7   MCV  103.4*  105.6*   MCH  31.2  30.9   MCHC  30.2*  29.3*   RDW  49.1  49.4   PLATELETCT  178  152*   MPV  9.9  10.3     Recent Labs      06/02/18   1723  06/03/18 0218   SODIUM  139  139   POTASSIUM  5.1  5.5   CHLORIDE  92*  94*   CO2  38*  40*   GLUCOSE  132*  140*   BUN  28*  28*   CREATININE  0.98  0.72   CALCIUM  9.5  9.3     Recent Labs      06/02/18 2030   APTT  29.3   INR  1.13     Recent Labs      06/02/18   1723  06/03/18 0218   BNPBTYPENAT  670*  997*     Recent Labs      06/03/18 0218   TRIGLYCERIDE  54   HDL  92   LDL  100*          Assessment/Plan     * COPD with acute exacerbation (HCC)- (present  on admission)   Assessment & Plan    Clinically improved  Ongoing bilateral wheezing  Continue iv steroids and aggressive pulmonary therapy        PAH (pulmonary artery hypertension) (HCC)- (present on admission)   Assessment & Plan    Echo pending        Sepsis associated hypotension (HCC)- (present on admission)   Assessment & Plan    This is sepsis (without associated acute organ dysfunction).   Etiology is likely bronchitis or pneumonitis  Continue unasyn and azithromycin  Mild hypotension with no associated dizziness - following closely  .        Lactic acidosis- (present on admission)   Assessment & Plan    resolved        Troponin level elevated- (present on admission)   Assessment & Plan    No chest pain  Troponins continue to increase  On aspirin  Hypoxemia likely contributing  Cardiology to eval  Echo pending          Acute on chronic respiratory failure with hypoxia (HCC)- (present on admission)   Assessment & Plan    Oxygen demand continues to increase  D dimer elevated  CTA pending  Continue to follow closely          Quality-Core Measures

## 2018-06-03 NOTE — H&P
Hospital Medicine History and Physical    Date of Service  6/2/2018    Chief Complaint  Chief Complaint   Patient presents with   • Shortness of Breath     x 1-2 weeks, uses 6L via nasal cannula on condenser at home   • Weakness     x4-5 days       History of Presenting Illness  83 y.o. male who presented 6/2/2018 with history of COPD, pulmonary htn on home O2 24/7 at 2.5 LPM NC for chronic respiratory failure presents with 1 week history of SOB, fatigue, wheeze.  He has been taking his inhalers.  He states he feels like he cannot get a full breath in.  He denies fever or chills.  His wife and daughter at bedside state he has been sleeping more during the daytime than his usual.    No recent travel.  No chest pain or heaviness.  No leg edema.    Lives with his wife, ambulates w/o assistive devices.    Primary Care Physician  Barbara Wilkins M.D.    Consultants  none    Code Status  Full code    Review of Systems  Review of Systems   Constitutional: Positive for malaise/fatigue. Negative for chills, diaphoresis and fever.   HENT: Negative for congestion and sore throat.    Eyes: Negative for pain and discharge.   Respiratory: Positive for shortness of breath and wheezing. Negative for cough, hemoptysis and sputum production.    Cardiovascular: Negative for chest pain, palpitations, claudication and leg swelling.   Gastrointestinal: Negative for abdominal pain, constipation, diarrhea, melena, nausea and vomiting.   Genitourinary: Negative for dysuria, frequency and urgency.   Musculoskeletal: Negative for back pain, joint pain, myalgias and neck pain.   Skin: Negative for itching and rash.   Neurological: Negative for dizziness, sensory change, speech change, focal weakness, loss of consciousness, weakness and headaches.   Endo/Heme/Allergies: Does not bruise/bleed easily.   Psychiatric/Behavioral: Negative for depression, substance abuse and suicidal ideas.        Past Medical History  Past Medical History:    Diagnosis Date   • Centrilobular emphysema (HCC) 12/3/2015   • COPD (chronic obstructive pulmonary disease) (HCC)    • Dyslipidemia    • Hypertension    • Pulmonary emphysema (HCC)    • Pulmonary hypertension (HCC)        Surgical History  Past Surgical History:   Procedure Laterality Date   • HERNIA REPAIR      right inguinal hernia        Medications  No current facility-administered medications on file prior to encounter.      Current Outpatient Prescriptions on File Prior to Encounter   Medication Sig Dispense Refill   • umeclidinium-vilanterol (ANORO ELLIPTA) 62.5-25 MCG/INH AEROSOL POWDER, BREATH ACTIVATED inhaler Inhale 1 Puff by mouth every day. 1 Each 0   • lisinopril (PRINIVIL) 20 MG Tab Take 1 Tab by mouth 2 times a day. 180 Tab 3   • fluticasone-salmeterol (ADVAIR) 500-50 MCG/DOSE AEROSOL POWDER, BREATH ACTIVATED Inhale 1 Puff by mouth every 12 hours. Rinse mouth after use 1 Inhaler 5   • tiotropium (SPIRIVA) 18 MCG Cap Inhale 18 mcg by mouth every day.     • tamsulosin (FLOMAX) 0.4 MG capsule Take 1 Cap by mouth every bedtime. 90 Cap 3   • atorvastatin (LIPITOR) 10 MG TABS Take 10 mg by mouth every evening.           Family History  Family History   Problem Relation Age of Onset   • Heart Disease Father    • Heart Attack Brother    • Other Brother      CAD   • Cancer Brother      lung, other       Social History  Social History   Substance Use Topics   • Smoking status: Former Smoker     Quit date: 2008   • Smokeless tobacco: Never Used   • Alcohol use 1.0 oz/week     2 Cans of beer per week      Comment: week       Allergies  No Known Allergies     Physical Exam  Laboratory   Hemodynamics  Temp (24hrs), Av.9 °C (98.4 °F), Min:36.7 °C (98 °F), Max:37.4 °C (99.3 °F)   Temperature: 36.7 °C (98 °F)  Pulse  Av.6  Min: 51  Max: 118 Heart Rate (Monitored): (!) 101  Blood Pressure : 142/80, NIBP: (!) 98/58      Respiratory      Respiration: (!) 24, Pulse Oximetry: 100 %, O2 Daily Delivery  Respiratory : Silicone Nasal Cannula     Given By:: Mask, Work Of Breathing / Effort: Moderate;Mild  RUL Breath Sounds: Expiratory Wheezes;Diminished, RML Breath Sounds: Diminished, RLL Breath Sounds: Diminished, JEFRY Breath Sounds: Expiratory Wheezes;Diminished, LLL Breath Sounds: Diminished    Physical Exam   Constitutional: He is oriented to person, place, and time. He appears well-developed and well-nourished. No distress.   HENT:   Head: Normocephalic and atraumatic.   Mouth/Throat: Oropharynx is clear and moist. No oropharyngeal exudate.   Eyes: Conjunctivae and EOM are normal. Pupils are equal, round, and reactive to light. Right eye exhibits no discharge. Left eye exhibits no discharge. No scleral icterus.   Neck: Normal range of motion. Neck supple. No JVD present. No tracheal deviation present. No thyromegaly present.   Cardiovascular: Normal rate, regular rhythm and normal heart sounds.  Exam reveals no gallop and no friction rub.    No murmur heard.  Pulmonary/Chest: Effort normal. No respiratory distress. He has wheezes. He has no rales. He exhibits no tenderness.   Abdominal: Soft. Bowel sounds are normal. He exhibits no distension and no mass. There is no tenderness. There is no rebound and no guarding.   Musculoskeletal: Normal range of motion. He exhibits no edema or tenderness.   Lymphadenopathy:     He has no cervical adenopathy.   Neurological: He is alert and oriented to person, place, and time. No cranial nerve deficit. He exhibits normal muscle tone.   Skin: Skin is warm and dry. No rash noted. He is not diaphoretic. No erythema.   Psychiatric: He has a normal mood and affect. His behavior is normal. Judgment and thought content normal.   Nursing note and vitals reviewed.      Recent Labs      06/02/18   1723   WBC  9.0   RBC  4.39*   HEMOGLOBIN  13.7*   HEMATOCRIT  45.4   MCV  103.4*   MCH  31.2   MCHC  30.2*   RDW  49.1   PLATELETCT  178   MPV  9.9     Recent Labs      06/02/18   1723    SODIUM  139   POTASSIUM  5.1   CHLORIDE  92*   CO2  38*   GLUCOSE  132*   BUN  28*   CREATININE  0.98   CALCIUM  9.5     Recent Labs      06/02/18   1723   ALTSGPT  42   ASTSGOT  46*   ALKPHOSPHAT  40   TBILIRUBIN  0.6   GLUCOSE  132*     Recent Labs      06/02/18   2030   APTT  29.3   INR  1.13     Recent Labs      06/02/18   1723   BNPBTYPENAT  670*         Lab Results   Component Value Date    TROPONINI 1.23 (H) 06/02/2018     Urinalysis:    Lab Results  Component Value Date/Time   SPECGRAVITY 1.010 11/18/2013 0636   GLUCOSEUR Negative 11/18/2013 0636   KETONES Negative 11/18/2013 0636   NITRITE Negative 11/18/2013 0636   WBCURINE 5-10 (A) 05/03/2013 0608   RBCURINE 0-2 (A) 05/03/2013 0608   BACTERIA Many (A) 05/03/2013 0608        Imaging  CXR negative except for COPD changes.   Assessment/Plan     I anticipate this patient will require at least two midnights for appropriate medical management, necessitating inpatient admission.    * COPD with acute exacerbation (HCC)- (present on admission)   Assessment & Plan    ST on exam, use xopenex neb q 4 hours prn,  Decadron 10mg IV given in ER, solumedrol 62.5 IV a 6 hours.  Continue home inhalers .  + wheeze b/l lungs on exam.        PAH (pulmonary artery hypertension) (HCC)- (present on admission)   Assessment & Plan    History of, no diuretic use.  Ordered echo.        Sepsis associated hypotension (HCC)- (present on admission)   Assessment & Plan    This is sepsis (without associated acute organ dysfunction).   Source bronchitis.    Unclear at this time, check UA.  On unasyn IV and zithromax.  CXR negative.  Elevated lactic acid.  BCs x2.  hold lisinopril 20mg daily.        Lactic acidosis- (present on admission)   Assessment & Plan    Elevated 2.4, repeat, no IVFs unless BP <90/60.          Troponin level elevated- (present on admission)   Assessment & Plan    Adamantly denies chest pain.  Repeat troponin level.  EKG with  no ischemic  changes.  Continue asa 81mg po daily  Check echo  Lipid panel.  D-dimer.        Acute on chronic respiratory failure with hypoxia (HCC)- (present on admission)   Assessment & Plan    Increase in O2 demand from 2.5 to 4 LPM NC.  2/2 acute COPD exacerbation.  Check d-dimer level.            VTE prophylaxis: heparin.

## 2018-06-03 NOTE — RESPIRATORY CARE
"COPD EDUCATION by COPD CLINICAL EDUCATOR  (Phone: [phone])  6/3/2018 at 1:08 PM by Khushbu King     Patient seen by Respiratory Education team to complete Block 1 of a 2 part series. Reference material about the program was given to patient along with our contact information.  Part #1 includes: What is COPD (how the lungs work), common treatments for COPD, the difference between \"rescue\" medications and \"daily\" medications, bronchial hygiene, and explanation of the Action Plan. We discussed appropriate medication technique along with a demonstration, and the correct way to care for and clean equipment. A treatment was performed  (Flutter/PEP) as documented. Advance Directives  were discussed as appropriate to this patient. Question and answer session followed.  He ha an appointment and a PFT scheduled with [facility] on 6/7/2018 ay 1500 and 1700. [facility]. He has home oxygen and a nebulizer with medication available at home.    "

## 2018-06-03 NOTE — DIETARY
Nutrition Services: Day 1 of admit.  Garrett Alvarez is a 83 y.o. male with admitting DX of COPD with acute exacerbation, Troponin level elevated, Acute on chronic respiratory failure  Consult received for Poor PO    Assessment:  Ht: 167.6 cm, Wt 6/2: 54.2 kg via bed scale, BMI: 19.3  Diet/Intake: Cardiac - No PO documented in chart yet.      Evaluation:   1. Attempted to visit pt, pt was sleeping and would not arouse to name.   2. Per chart review pt's wt on 3/27 - 57.7 kg. Wt loss percent is 6.1% in ~10 weeks, which is significant  3. Labs: Glucose 140, BUN 28  4. Meds: unasyn, lipitor, zithromax, lovenox, solu-medrol    Recommendations/Plan:  1. Encourage intake of meals  2. Document intake of all meals as % taken in ADL's to provide interdisciplinary communication across all shifts.   3. Monitor weight.  4. Nutrition rep will continue to see patient for ongoing meal and snack preferences.  5. Obtain supplement order per RD as needed.    RD following

## 2018-06-03 NOTE — ED NOTES
Tech from Lab called with critical result of troponin 1.23 at 1845. Critical lab result read back to tech.   Dr. Escalante notified of critical lab result at 1846.  Critical lab result read back by Dr. Escalante.

## 2018-06-03 NOTE — ASSESSMENT & PLAN NOTE
This is sepsis (without associated acute organ dysfunction).   Etiology is likely bronchitis or pneumonitis  Continue unasyn and azithromycin  Mild hypotension has resolved.  Much improved

## 2018-06-03 NOTE — RESPIRATORY CARE
COPD EDUCATION by COPD CLINICAL EDUCATOR  6/3/2018  at  10:50 AM by Khushbu King     Patient interviewed by COPD education team.  Questions regarding correct medications patient currently taking prior to admit.   Clarifying with patient; he is actively taking Anoro Ellipta Daily.  He was instructed by his pulmonary physician ([facility]) to NOT take Spiriva and Advair while he was on this new medication (he was following this direction). This will be continued while here in the hospital. D/W Pharmacist -Bernie, returned other medications to pharmacy as directed.     He will be participating in COPD inpatient program.

## 2018-06-03 NOTE — CARE PLAN
Problem: Safety  Goal: Will remain free from falls  Outcome: PROGRESSING AS EXPECTED  Pt will remain free from falls. Educated pt to call before getting out of bed. Bed alarm set.    Problem: Venous Thromboembolism (VTW)/Deep Vein Thrombosis (DVT) Prevention:  Goal: Patient will participate in Venous Thrombosis (VTE)/Deep Vein Thrombosis (DVT)Prevention Measures  Outcome: PROGRESSING AS EXPECTED  Pt will remain DVT free. Will continue to give pt lovenox to prevent DVT and encourage pt to walk.

## 2018-06-03 NOTE — ED PROVIDER NOTES
ED Provider Note    CHIEF COMPLAINT  Chief Complaint   Patient presents with   • Shortness of Breath     x 1-2 weeks, uses 6L via nasal cannula on condenser at home   • Weakness     x4-5 days       HPI  Garrett Alvarez is a 83 y.o. male who presents to the emergency department brought in by family with complaints of 1 week of progressively worsening shortness of breath and wheezing and cough producing nonbloody sputum.  The patient has been very fatigued in the family says he is sleeping all of the time.  There are no recognized precipitating events or exacerbating or alleviating factors.    REVIEW OF SYSTEMS no chest pain no hemoptysis no fever no abdominal or back pain.  All other systems negative    PAST MEDICAL HISTORY  Past Medical History:   Diagnosis Date   • Centrilobular emphysema (HCC) 12/3/2015   • COPD (chronic obstructive pulmonary disease) (HCC)    • Dyslipidemia    • Hypertension    • Pulmonary emphysema (HCC)    • Pulmonary hypertension (HCC)        FAMILY HISTORY  Family History   Problem Relation Age of Onset   • Heart Disease Father    • Heart Attack Brother    • Other Brother      CAD   • Cancer Brother      lung, other       SOCIAL HISTORY  Social History     Social History   • Marital status:      Spouse name: N/A   • Number of children: N/A   • Years of education: N/A     Social History Main Topics   • Smoking status: Former Smoker     Quit date: 1/1/2008   • Smokeless tobacco: Never Used   • Alcohol use 1.0 oz/week     2 Cans of beer per week      Comment: week   • Drug use: No   • Sexual activity: Yes     Partners: Female     Other Topics Concern   • Not on file     Social History Narrative   • No narrative on file       SURGICAL HISTORY  Past Surgical History:   Procedure Laterality Date   • HERNIA REPAIR  1990    right inguinal hernia        CURRENT MEDICATIONS  Home Medications    **Home medications have not yet been reviewed for this encounter**         ALLERGIES  No Known  "Allergies    PHYSICAL EXAM  VITAL SIGNS: /67   Pulse (!) 113   Temp 37.4 °C (99.3 °F)   Resp (!) 24   Ht 1.676 m (5' 6\")   Wt 50.1 kg (110 lb 7.2 oz)   SpO2 99%   BMI 17.83 kg/m²    Oxygen saturation is interpreted as adequate  Constitutional: Awake and moderately ill-appearing  HENT: Mucous membranes are dry  Eyes: No erythema or discharge or jaundice  Neck: Trachea midline no JVD  Cardiovascular: Regular tachycardia  Lungs: There are wheezes throughout both lung fields at the time of arrival  Abdomen/Back: Soft nontender nondistended no peritoneal findings  Skin: Warm and dry  Musculoskeletal: No acute bony deformity  Neurologic: Awake verbal moving all extremities    Laboratory  CBC shows white blood cell count of 9.0 hemoglobin is adequate at 13.7 basic metabolic panels unremarkable AST was minimally elevated at 146 blood glucose is minimally elevated at 132.  Lactic acid level is elevated at 2.5.  Troponin is elevated at 1.23 with an elevated BNP of 670    EKG interpretation  Twelve-lead EKG shows sinus rhythm 115 bpm there is artifact in V4 V5 and V6 there is no diagnostic ST elevation    Radiology  DX-CHEST-LIMITED (1 VIEW)   Final Result      1.  No evidence of acute cardiopulmonary process.      2.  COPD            MEDICAL DECISION MAKING and DISPOSITION  In the emergency department an IV was established and the patient was placed on the cardiac monitor and the patient was given intravenous Decadron and albuterol and Atrovent by nebulizer.  These measures have been very helpful the patient is breathing much more easily and seems more comfortable.  He says he is breathing better.  The patient says that he has not been having any kind of chest pain.  I reviewed all the findings with the patient and his family and I have reviewed the case with the hospitalist and the patient is going to require hospitalization and further evaluation and treatment.    IMPRESSION  1.  COPD with acute " exacerbation  2.  Acute coronary syndrome with elevated troponin  3.  Elevated lactic acid level    Electronically signed by: Franklin Escalante, 6/2/2018 7:18 PM

## 2018-06-03 NOTE — ED TRIAGE NOTES
Garrett Alvarez 83 y.o. male who presents to ED from home via EMS for chief complaint of Shortness of Breath (x 1-2 weeks, uses 6L via nasal cannula on condenser at home) and Weakness (x4-5 days)    Found with EMS to have saturations of 68%. Received 1 duoneb, 1 albuterol, placed on NRB with improvement in saturations to 95%.    Patient is alert and oriented x 4, respirations shallow, reports feeling better after breathing treatments with EMS.     PMHx COPD, asthma, HTN    Stretcher low, wheels locked, call light within reach. Placed on continuous cardiac, blood pressure, and pulse oximetry monitoring.

## 2018-06-04 ENCOUNTER — APPOINTMENT (OUTPATIENT)
Dept: RADIOLOGY | Facility: MEDICAL CENTER | Age: 83
DRG: 871 | End: 2018-06-04
Attending: HOSPITALIST
Payer: MEDICARE

## 2018-06-04 ENCOUNTER — PATIENT OUTREACH (OUTPATIENT)
Dept: HEALTH INFORMATION MANAGEMENT | Facility: OTHER | Age: 83
End: 2018-06-04

## 2018-06-04 PROBLEM — N19 RENAL FAILURE: Status: ACTIVE | Noted: 2018-06-04

## 2018-06-04 LAB
ANION GAP SERPL CALC-SCNC: 5 MMOL/L (ref 0–11.9)
BUN SERPL-MCNC: 42 MG/DL (ref 8–22)
CALCIUM SERPL-MCNC: 9.2 MG/DL (ref 8.5–10.5)
CHLORIDE SERPL-SCNC: 92 MMOL/L (ref 96–112)
CO2 SERPL-SCNC: 38 MMOL/L (ref 20–33)
CREAT SERPL-MCNC: 1.14 MG/DL (ref 0.5–1.4)
GLUCOSE SERPL-MCNC: 137 MG/DL (ref 65–99)
POTASSIUM SERPL-SCNC: 4.9 MMOL/L (ref 3.6–5.5)
SODIUM SERPL-SCNC: 135 MMOL/L (ref 135–145)
TROPONIN I SERPL-MCNC: 1.94 NG/ML (ref 0–0.04)

## 2018-06-04 PROCEDURE — A9270 NON-COVERED ITEM OR SERVICE: HCPCS | Performed by: INTERNAL MEDICINE

## 2018-06-04 PROCEDURE — 700102 HCHG RX REV CODE 250 W/ 637 OVERRIDE(OP): Performed by: INTERNAL MEDICINE

## 2018-06-04 PROCEDURE — G8989 SELF CARE D/C STATUS: HCPCS | Mod: CI

## 2018-06-04 PROCEDURE — 84484 ASSAY OF TROPONIN QUANT: CPT

## 2018-06-04 PROCEDURE — 94760 N-INVAS EAR/PLS OXIMETRY 1: CPT

## 2018-06-04 PROCEDURE — 99232 SBSQ HOSP IP/OBS MODERATE 35: CPT | Performed by: HOSPITALIST

## 2018-06-04 PROCEDURE — 80048 BASIC METABOLIC PNL TOTAL CA: CPT

## 2018-06-04 PROCEDURE — 94668 MNPJ CHEST WALL SBSQ: CPT

## 2018-06-04 PROCEDURE — 700111 HCHG RX REV CODE 636 W/ 250 OVERRIDE (IP): Performed by: INTERNAL MEDICINE

## 2018-06-04 PROCEDURE — 770020 HCHG ROOM/CARE - TELE (206)

## 2018-06-04 PROCEDURE — G8978 MOBILITY CURRENT STATUS: HCPCS | Mod: CI

## 2018-06-04 PROCEDURE — 700111 HCHG RX REV CODE 636 W/ 250 OVERRIDE (IP): Performed by: HOSPITALIST

## 2018-06-04 PROCEDURE — 97161 PT EVAL LOW COMPLEX 20 MIN: CPT

## 2018-06-04 PROCEDURE — 700101 HCHG RX REV CODE 250: Performed by: HOSPITALIST

## 2018-06-04 PROCEDURE — 76775 US EXAM ABDO BACK WALL LIM: CPT

## 2018-06-04 PROCEDURE — 36415 COLL VENOUS BLD VENIPUNCTURE: CPT

## 2018-06-04 PROCEDURE — 94640 AIRWAY INHALATION TREATMENT: CPT

## 2018-06-04 PROCEDURE — G8987 SELF CARE CURRENT STATUS: HCPCS | Mod: CI

## 2018-06-04 PROCEDURE — G8980 MOBILITY D/C STATUS: HCPCS | Mod: CI

## 2018-06-04 PROCEDURE — 97165 OT EVAL LOW COMPLEX 30 MIN: CPT

## 2018-06-04 PROCEDURE — G8988 SELF CARE GOAL STATUS: HCPCS | Mod: CI

## 2018-06-04 PROCEDURE — G8979 MOBILITY GOAL STATUS: HCPCS | Mod: CI

## 2018-06-04 PROCEDURE — 700102 HCHG RX REV CODE 250 W/ 637 OVERRIDE(OP): Performed by: HOSPITALIST

## 2018-06-04 PROCEDURE — A9270 NON-COVERED ITEM OR SERVICE: HCPCS | Performed by: HOSPITALIST

## 2018-06-04 RX ORDER — ATORVASTATIN CALCIUM 40 MG/1
40 TABLET, FILM COATED ORAL NIGHTLY
Status: DISCONTINUED | OUTPATIENT
Start: 2018-06-04 | End: 2018-06-06 | Stop reason: HOSPADM

## 2018-06-04 RX ORDER — CALCIUM CARBONATE 500 MG/1
500 TABLET, CHEWABLE ORAL EVERY 6 HOURS PRN
Status: DISCONTINUED | OUTPATIENT
Start: 2018-06-04 | End: 2018-06-06 | Stop reason: HOSPADM

## 2018-06-04 RX ORDER — LEVALBUTEROL INHALATION SOLUTION 0.63 MG/3ML
0.63 SOLUTION RESPIRATORY (INHALATION)
Status: DISCONTINUED | OUTPATIENT
Start: 2018-06-04 | End: 2018-06-06 | Stop reason: HOSPADM

## 2018-06-04 RX ORDER — PREDNISONE 20 MG/1
20 TABLET ORAL 2 TIMES DAILY
Status: DISCONTINUED | OUTPATIENT
Start: 2018-06-04 | End: 2018-06-06 | Stop reason: HOSPADM

## 2018-06-04 RX ADMIN — METHYLPREDNISOLONE SODIUM SUCCINATE 62.5 MG: 125 INJECTION, POWDER, FOR SOLUTION INTRAMUSCULAR; INTRAVENOUS at 11:24

## 2018-06-04 RX ADMIN — METHYLPREDNISOLONE SODIUM SUCCINATE 62.5 MG: 125 INJECTION, POWDER, FOR SOLUTION INTRAMUSCULAR; INTRAVENOUS at 00:25

## 2018-06-04 RX ADMIN — LEVALBUTEROL HYDROCHLORIDE 0.63 MG: 0.63 SOLUTION RESPIRATORY (INHALATION) at 10:48

## 2018-06-04 RX ADMIN — CLOPIDOGREL 75 MG: 75 TABLET, FILM COATED ORAL at 05:13

## 2018-06-04 RX ADMIN — LEVALBUTEROL HYDROCHLORIDE 0.63 MG: 0.63 SOLUTION RESPIRATORY (INHALATION) at 14:33

## 2018-06-04 RX ADMIN — METHYLPREDNISOLONE SODIUM SUCCINATE 62.5 MG: 125 INJECTION, POWDER, FOR SOLUTION INTRAMUSCULAR; INTRAVENOUS at 05:12

## 2018-06-04 RX ADMIN — AZITHROMYCIN 250 MG: 250 TABLET, FILM COATED ORAL at 05:11

## 2018-06-04 RX ADMIN — TAMSULOSIN HYDROCHLORIDE 0.4 MG: 0.4 CAPSULE ORAL at 17:24

## 2018-06-04 RX ADMIN — ANTACID TABLETS 500 MG: 500 TABLET, CHEWABLE ORAL at 02:50

## 2018-06-04 RX ADMIN — ANTACID TABLETS 500 MG: 500 TABLET, CHEWABLE ORAL at 20:08

## 2018-06-04 RX ADMIN — LEVALBUTEROL HYDROCHLORIDE 0.63 MG: 0.63 SOLUTION RESPIRATORY (INHALATION) at 02:07

## 2018-06-04 RX ADMIN — ENOXAPARIN SODIUM 60 MG: 100 INJECTION SUBCUTANEOUS at 10:12

## 2018-06-04 RX ADMIN — UMECLIDINIUM BROMIDE AND VILANTEROL TRIFENATATE 1 PUFF: 62.5; 25 POWDER RESPIRATORY (INHALATION) at 05:12

## 2018-06-04 RX ADMIN — PREDNISONE 20 MG: 20 TABLET ORAL at 17:24

## 2018-06-04 RX ADMIN — PREDNISONE 20 MG: 20 TABLET ORAL at 13:18

## 2018-06-04 RX ADMIN — ENOXAPARIN SODIUM 60 MG: 100 INJECTION SUBCUTANEOUS at 17:24

## 2018-06-04 RX ADMIN — LEVALBUTEROL HYDROCHLORIDE 0.63 MG: 0.63 SOLUTION RESPIRATORY (INHALATION) at 07:12

## 2018-06-04 RX ADMIN — ASPIRIN 81 MG: 81 TABLET, COATED ORAL at 05:11

## 2018-06-04 RX ADMIN — ATORVASTATIN CALCIUM 40 MG: 40 TABLET, FILM COATED ORAL at 17:25

## 2018-06-04 RX ADMIN — LEVALBUTEROL HYDROCHLORIDE 0.63 MG: 0.63 SOLUTION RESPIRATORY (INHALATION) at 17:59

## 2018-06-04 ASSESSMENT — COGNITIVE AND FUNCTIONAL STATUS - GENERAL
DRESSING REGULAR LOWER BODY CLOTHING: A LITTLE
SUGGESTED CMS G CODE MODIFIER DAILY ACTIVITY: CJ
SUGGESTED CMS G CODE MODIFIER MOBILITY: CI
CLIMB 3 TO 5 STEPS WITH RAILING: A LITTLE
MOBILITY SCORE: 23
DAILY ACTIVITIY SCORE: 22
HELP NEEDED FOR BATHING: A LITTLE

## 2018-06-04 ASSESSMENT — ENCOUNTER SYMPTOMS
HEMOPTYSIS: 0
GASTROINTESTINAL NEGATIVE: 1
DIZZINESS: 0
WHEEZING: 0
WEAKNESS: 0
PALPITATIONS: 0
NAUSEA: 0
SPUTUM PRODUCTION: 0
VOMITING: 0
PND: 0
ORTHOPNEA: 0
COUGH: 0
MUSCULOSKELETAL NEGATIVE: 1
EYES NEGATIVE: 1
CLAUDICATION: 0
NEUROLOGICAL NEGATIVE: 1
WHEEZING: 1
CONSTITUTIONAL NEGATIVE: 1
COUGH: 1
SHORTNESS OF BREATH: 1
SPUTUM PRODUCTION: 1

## 2018-06-04 ASSESSMENT — GAIT ASSESSMENTS
GAIT LEVEL OF ASSIST: SUPERVISED
DISTANCE (FEET): 85
DEVIATION: OTHER (COMMENT)
ASSISTIVE DEVICE: OTHER (COMMENTS)

## 2018-06-04 ASSESSMENT — PAIN SCALES - GENERAL
PAINLEVEL_OUTOF10: 0

## 2018-06-04 ASSESSMENT — ACTIVITIES OF DAILY LIVING (ADL): TOILETING: INDEPENDENT

## 2018-06-04 NOTE — ASSESSMENT & PLAN NOTE
Mild hydronephrosis, no evidence of ureteral obstruction  Urinary retention  Lazar placed  Renal function improved  Continue to follow

## 2018-06-04 NOTE — DISCHARGE PLANNING
Transitional Care Navigator:    Chart reviewed for post acute needs.  Pt is an 83 yom admitted for sob, and history of COPD and HTN.  His LACE+ is 67, with mobility of 15 feet. PT/OT evaluations are pending.    Currently, this patient is an appropriate candidate for home health support to monitor respiratory status.  Please consider an order for HH.

## 2018-06-04 NOTE — PROGRESS NOTES
[facility] Hospitalist Progress Note    Date of Service: 2018    Chief Complaint  83 y.o. male admitted 2018 with shortness of breath and generalized malaise with acute on chronic hypoxemic respiratory failure    Interval Problem Update  Lung exam improving, decreasing oxygen requirements  Denies chest pain, denies sob  Ros otherwise negative  axox4 but poor memory    Consultants/Specialty  cardiology    Disposition          Review of Systems   Constitutional: Negative.    HENT: Negative.    Eyes: Negative.    Respiratory: Positive for cough, sputum production, shortness of breath and wheezing.    Cardiovascular: Negative for chest pain, palpitations, orthopnea, claudication, leg swelling and PND.   Gastrointestinal: Negative.    Genitourinary: Negative.    Musculoskeletal: Negative.    Skin: Negative.    Neurological: Negative.    Endo/Heme/Allergies: Negative.       Physical Exam  Laboratory/Imaging   Hemodynamics  Temp (24hrs), Av.9 °C (98.5 °F), Min:36.8 °C (98.2 °F), Max:37.2 °C (99 °F)   Temperature: 36.9 °C (98.5 °F)  Pulse  Av.9  Min: 10  Max: 118   Blood Pressure : 113/64      Respiratory      Respiration: 16, Pulse Oximetry: 93 %, O2 Daily Delivery Respiratory : Silicone Nasal Cannula   RR currently 18  Given By:: Mouthpiece, PEP/CPT Method: Flutter Valve, Work Of Breathing / Effort: Mild  RUL Breath Sounds: Clear, RML Breath Sounds: Diminished, RLL Breath Sounds: Diminished, JEFRY Breath Sounds: Clear, LLL Breath Sounds: Diminished    Fluids  No intake or output data in the 24 hours ending 18 1238    Nutrition  Orders Placed This Encounter   Procedures   • Diet Order     Standing Status:   Standing     Number of Occurrences:   1     Order Specific Question:   Diet:     Answer:   Cardiac [6]     Physical Exam   Constitutional: He is oriented to person, place, and time. No distress.   HENT:   Head: Normocephalic and atraumatic.   Mouth/Throat: Oropharynx is clear and moist. No oropharyngeal  exudate.   Eyes: Conjunctivae are normal.   Neck: No JVD present. No tracheal deviation present.   Cardiovascular: Normal rate, regular rhythm, normal heart sounds and intact distal pulses.  Exam reveals no gallop and no friction rub.    No murmur heard.  Pulmonary/Chest: Effort normal. No stridor. No respiratory distress. He has wheezes (much improved with improved air mvt). He has rales. He exhibits no tenderness.   Abdominal: Soft. Bowel sounds are normal. He exhibits no distension and no mass. There is no tenderness. There is no rebound and no guarding.   Musculoskeletal: Normal range of motion. He exhibits no edema, tenderness or deformity.   Neurological: He is alert and oriented to person, place, and time. He has normal reflexes. No cranial nerve deficit. He exhibits normal muscle tone. Coordination normal.   Skin: Skin is warm and dry. No rash noted. He is not diaphoretic. No erythema. No pallor.   Psychiatric: He has a normal mood and affect. His behavior is normal. Judgment and thought content normal.   Nursing note and vitals reviewed.      Recent Labs      06/02/18 1723 06/03/18 0218   WBC  9.0  6.3   RBC  4.39*  4.14*   HEMOGLOBIN  13.7*  12.8*   HEMATOCRIT  45.4  43.7   MCV  103.4*  105.6*   MCH  31.2  30.9   MCHC  30.2*  29.3*   RDW  49.1  49.4   PLATELETCT  178  152*   MPV  9.9  10.3     Recent Labs      06/02/18   1723  06/03/18   0218  06/04/18   0206   SODIUM  139  139  135   POTASSIUM  5.1  5.5  4.9   CHLORIDE  92*  94*  92*   CO2  38*  40*  38*   GLUCOSE  132*  140*  137*   BUN  28*  28*  42*   CREATININE  0.98  0.72  1.14   CALCIUM  9.5  9.3  9.2     Recent Labs      06/02/18   2030   APTT  29.3   INR  1.13     Recent Labs      06/02/18   1723  06/03/18 0218   BNPBTYPENAT  670*  997*     Recent Labs      06/03/18 0218   TRIGLYCERIDE  54   HDL  92   LDL  100*          Assessment/Plan     * COPD with acute exacerbation (HCC)- (present on admission)   Assessment & Plan    Improving  Will  change steroids to oral        PAH (pulmonary artery hypertension) (HCC)- (present on admission)   Assessment & Plan    No significant changes on echo        Sepsis associated hypotension (HCC)- (present on admission)   Assessment & Plan    This is sepsis (without associated acute organ dysfunction).   Etiology is likely bronchitis or pneumonitis  Continue unasyn and azithromycin  Mild hypotension has resolved.        Lactic acidosis- (present on admission)   Assessment & Plan    resolved        Troponin level elevated- (present on admission)   Assessment & Plan    No chest pain  Suspect demand related ischemia  Cardiology following  Plavix started        Acute on chronic respiratory failure with hypoxia (HCC)- (present on admission)   Assessment & Plan    Oxygen demand decreasing  No PE on CT scan          Quality-Core Measures

## 2018-06-04 NOTE — THERAPY
"Physical Therapy Evaluation completed.   Bed Mobility:  Supine to Sit: Supervised  Transfers: Sit to Stand: Supervised  Gait: Level Of Assist: Supervised with pushing portale 02 tank       Plan of Care: Patient with no further skilled PT needs in the acute care setting at this time  Discharge Recommendations: Equipment: No Equipment Needed. See below    After initial evaluation and pt education, pt has no further skilled acute PT needs. She is able to demonstrate short, hallway ambulation pushing portable 02 without physical assist. He is only slighlty limited 2' to SOB with ambulation though reports he is close to baseline. Anticipate that pt will be functionally capable of dc to home once medically cleared and pt reports no concerns with dc plan to home. He has no immediate skilled PT needs after dc to home and pt should continue mobilizing with staff while here to prevent deconditioning.     See \"Rehab Therapy-Acute\" Patient Summary Report for complete documentation.     "

## 2018-06-04 NOTE — CARE PLAN
Problem: Safety  Goal: Will remain free from injury  Outcome: PROGRESSING AS EXPECTED  Pt will remain free from injury. Will continue to do hourly rounding and educate about fall precautions.     Problem: Bowel/Gastric:  Goal: Normal bowel function is maintained or improved  Outcome: PROGRESSING AS EXPECTED  Pt will maintain adequate bowl function. Will continue to offer stool softeners and encourage ambulating.

## 2018-06-04 NOTE — THERAPY
"Occupational Therapy Evaluation completed.   Functional Status:  spv w/bed mobility, spv w/sit>stand walking in room no AD no LOB, spv w/LB dressing declined use of bathroom at this time, reports he txf to toilet earlier w/o A. Some SOB w/activity, using O2 at home, no LOB. Requested BTB post session RN aware of session and pts efforts   Plan of Care: Patient with no further skilled OT needs in the acute care setting at this time  Discharge Recommendations:  Equipment: No Equipment Needed. Post-acute therapy Discharge to home with outpatient or home health for additional skilled therapy services.    See \"Rehab Therapy-Acute\" Patient Summary Report for complete documentation.      83 yr old male admitted for SOB and generalized malaise w/acute on chronic hypoxemic respiratory failure, appears to have some deconditioning, however was able to demonstrate ability to complete basic ADL's and txfs. Pt would benefit from continued daily OOB activity w/nsg, up in chair in meals and increasing ambulation as tolerated. Pt reports assist from spouse as needed, no further acute OT needs at this time.   "

## 2018-06-04 NOTE — CARE PLAN
Problem: Oxygenation:  Goal: Maintain adequate oxygenation dependent on patient condition  Outcome: PROGRESSING AS EXPECTED  Pt is on 3LPM  SPO2 is 93%    Problem: Bronchoconstriction:  Goal: Improve in air movement and diminished wheezing  Outcome: PROGRESSING AS EXPECTED  Xopenx Q4hrs

## 2018-06-04 NOTE — PROGRESS NOTES
Assumed care of patient, bedside report received from ABRAM Vo. Call light within reach and fall precautions in place. Family at bedside. Bed locked and in lowest position. Patient instructed to call for assistance before getting out of bed. All questions answered, no other needs at this time.

## 2018-06-04 NOTE — PROGRESS NOTES
Cardiology Progress Note               Author: Freddy Adhikari Date & Time created: 6/4/2018  8:41 AM     Interval History:  83 years old male present with SOB and weakness. HX: COPD, hypertension, and dyslipidemia. He was noted to have elevated trop.     Chief Complaint:  SOB    Cardiology consult: positive trop    6/4/18:breathing better, he is sitting up in bed. Talking in full sentence. On 3.5L now, normally on 2.5L at home.       Echocardiography Laboratory  6/3/18  Normal left ventricular systolic function.  Left ventricular ejection fraction is visually estimated to be 55%.  Abnormal septal motion.  The right ventricle was normal in size and function.  Mild mitral regurgitation.  Moderate tricuspid regurgitation.  Estimated right ventricular systolic pressure is 45 mmHg.  Compared to the images of the study done 5/7/2015 - there has been no   significant change.    Review of Systems   Constitutional: Negative for malaise/fatigue.   Respiratory: Positive for shortness of breath. Negative for cough, hemoptysis, sputum production and wheezing.    Cardiovascular: Negative for chest pain, palpitations, orthopnea, claudication, leg swelling and PND.   Gastrointestinal: Negative for nausea and vomiting.   Neurological: Negative for dizziness and weakness.       Physical Exam   Constitutional: He is oriented to person, place, and time. He appears well-developed and well-nourished. No distress.   HENT:   Head: Normocephalic.   Neck: Normal range of motion. No JVD present.   Cardiovascular: Normal rate, regular rhythm, normal heart sounds and intact distal pulses.    No murmur heard.  Pulmonary/Chest: Effort normal. No respiratory distress. He has decreased breath sounds. He has no wheezes. He has rhonchi.   Abdominal: He exhibits no distension. There is no tenderness.   Musculoskeletal: He exhibits no edema or tenderness.   Neurological: He is alert and oriented to person, place, and time.   Skin: Skin is warm and dry.  No rash noted. He is not diaphoretic.   Psychiatric: His behavior is normal. Judgment normal.   Nursing note and vitals reviewed.      Hemodynamics:  Temp (24hrs), Av.9 °C (98.5 °F), Min:36.8 °C (98.2 °F), Max:37.2 °C (99 °F)  Temperature: 36.8 °C (98.2 °F)  Pulse  Av.4  Min: 10  Max: 118  Blood Pressure : 125/78     Respiratory:    Respiration: 18, Pulse Oximetry: 96 %, O2 Daily Delivery Respiratory : Silicone Nasal Cannula     Given By:: Mouthpiece, PEP/CPT Method: Flutter Valve, Work Of Breathing / Effort: Mild  RUL Breath Sounds: Diminished, RML Breath Sounds: Diminished, RLL Breath Sounds: Diminished, JEFRY Breath Sounds: Diminished, LLL Breath Sounds: Diminished  Fluids:     Weight: 57.7 kg (127 lb 3.3 oz)  GI/Nutrition:  Orders Placed This Encounter   Procedures   • Diet Order     Standing Status:   Standing     Number of Occurrences:   1     Order Specific Question:   Diet:     Answer:   Cardiac [6]     Lab Results:  Recent Labs      18   WBC  9.0  6.3   RBC  4.39*  4.14*   HEMOGLOBIN  13.7*  12.8*   HEMATOCRIT  45.4  43.7   MCV  103.4*  105.6*   MCH  31.2  30.9   MCHC  30.2*  29.3*   RDW  49.1  49.4   PLATELETCT  178  152*   MPV  9.9  10.3     Recent Labs      18   0206   SODIUM  139  139  135   POTASSIUM  5.1  5.5  4.9   CHLORIDE  92*  94*  92*   CO2  38*  40*  38*   GLUCOSE  132*  140*  137*   BUN  28*  28*  42*   CREATININE  0.98  0.72  1.14   CALCIUM  9.5  9.3  9.2     Recent Labs      18   2030   APTT  29.3   INR  1.13     Recent Labs      18   BNPBTYPENAT  670*  997*     Recent Labs      18   1723  06/03/18   0218  18   0832   TROPONINI  1.23*  2.44*  2.52*   BNPBTYPENAT  670*  997*   --      Recent Labs      18   0218   TRIGLYCERIDE  54   HDL  92   LDL  100*         Medical Decision Making, by Problem:  Active Hospital Problems    Diagnosis   • *COPD with acute  exacerbation ([facility]) [J44.1]   • PAH (pulmonary artery hypertension) ([facility]) [I27.21]   • Acute on chronic respiratory failure with hypoxia ([facility]) [J96.21]   • Troponin level elevated [R74.8]   • Lactic acidosis [E87.2]   • Sepsis associated hypotension ([facility]) [A41.9]       Plan:  1. Positive trop:  - peaked at 2.52, repeat today  - no rhythm issue overnight  - ECHO: ef 55%.  - CTA showed atherosclerotic disease with coronary artery calcification   - most likely demand ischemia in the setting of stress from COPD exacerbation  - PET stress test outpatient after COPD exacerbation resolve  - continue asa, statin, plavix     2. COPD:  - IV abx      Future Appointments  Date Time Provider Department Center   6/7/2018 3:00 PM PFT-RM1 [facility] None   6/7/2018 5:00 PM Doreen Pérez A.P.N. [facility] None   6/21/2018 3:00 PM [email or web address] [email or web address] [facility] None         Quality-Core Measures   Reviewed items::  EKG reviewed, Labs reviewed and Medications reviewed

## 2018-06-04 NOTE — CARE PLAN
Problem: Infection  Goal: Will remain free from infection  Outcome: PROGRESSING AS EXPECTED  Educated patient on the importance of good handwashing for self and staff, verbalized understanding.

## 2018-06-05 ENCOUNTER — HOME HEALTH ADMISSION (OUTPATIENT)
Dept: HOME HEALTH SERVICES | Facility: HOME HEALTHCARE | Age: 83
End: 2018-06-05
Payer: MEDICARE

## 2018-06-05 PROBLEM — R79.89 TROPONIN LEVEL ELEVATED: Status: RESOLVED | Noted: 2018-06-03 | Resolved: 2018-06-05

## 2018-06-05 PROBLEM — N19 RENAL FAILURE: Status: RESOLVED | Noted: 2018-06-04 | Resolved: 2018-06-05

## 2018-06-05 PROBLEM — I95.9 SEPSIS ASSOCIATED HYPOTENSION (HCC): Status: RESOLVED | Noted: 2018-06-03 | Resolved: 2018-06-05

## 2018-06-05 PROBLEM — A41.9 SEPSIS ASSOCIATED HYPOTENSION (HCC): Status: RESOLVED | Noted: 2018-06-03 | Resolved: 2018-06-05

## 2018-06-05 PROBLEM — E87.20 LACTIC ACIDOSIS: Status: RESOLVED | Noted: 2018-06-03 | Resolved: 2018-06-05

## 2018-06-05 LAB
ANION GAP SERPL CALC-SCNC: 1 MMOL/L (ref 0–11.9)
BASOPHILS # BLD AUTO: 0 % (ref 0–1.8)
BASOPHILS # BLD: 0 K/UL (ref 0–0.12)
BUN SERPL-MCNC: 26 MG/DL (ref 8–22)
CALCIUM SERPL-MCNC: 9 MG/DL (ref 8.5–10.5)
CHLORIDE SERPL-SCNC: 92 MMOL/L (ref 96–112)
CO2 SERPL-SCNC: 42 MMOL/L (ref 20–33)
CREAT SERPL-MCNC: 0.67 MG/DL (ref 0.5–1.4)
EOSINOPHIL # BLD AUTO: 0 K/UL (ref 0–0.51)
EOSINOPHIL NFR BLD: 0 % (ref 0–6.9)
ERYTHROCYTE [DISTWIDTH] IN BLOOD BY AUTOMATED COUNT: 46.7 FL (ref 35.9–50)
GLUCOSE SERPL-MCNC: 105 MG/DL (ref 65–99)
HCT VFR BLD AUTO: 32.1 % (ref 42–52)
HGB BLD-MCNC: 10 G/DL (ref 14–18)
IMM GRANULOCYTES # BLD AUTO: 0.05 K/UL (ref 0–0.11)
IMM GRANULOCYTES NFR BLD AUTO: 0.9 % (ref 0–0.9)
LYMPHOCYTES # BLD AUTO: 0.61 K/UL (ref 1–4.8)
LYMPHOCYTES NFR BLD: 11.2 % (ref 22–41)
MCH RBC QN AUTO: 31.6 PG (ref 27–33)
MCHC RBC AUTO-ENTMCNC: 31.6 G/DL (ref 33.7–35.3)
MCV RBC AUTO: 100 FL (ref 81.4–97.8)
MONOCYTES # BLD AUTO: 0.66 K/UL (ref 0–0.85)
MONOCYTES NFR BLD AUTO: 12.1 % (ref 0–13.4)
NEUTROPHILS # BLD AUTO: 4.13 K/UL (ref 1.82–7.42)
NEUTROPHILS NFR BLD: 75.8 % (ref 44–72)
NRBC # BLD AUTO: 0 K/UL
NRBC BLD-RTO: 0 /100 WBC
PLATELET # BLD AUTO: 134 K/UL (ref 164–446)
PMV BLD AUTO: 10.3 FL (ref 9–12.9)
POTASSIUM SERPL-SCNC: 5.2 MMOL/L (ref 3.6–5.5)
RBC # BLD AUTO: 3.16 M/UL (ref 4.7–6.1)
SODIUM SERPL-SCNC: 135 MMOL/L (ref 135–145)
WBC # BLD AUTO: 5.5 K/UL (ref 4.8–10.8)

## 2018-06-05 PROCEDURE — 85025 COMPLETE CBC W/AUTO DIFF WBC: CPT

## 2018-06-05 PROCEDURE — 99232 SBSQ HOSP IP/OBS MODERATE 35: CPT | Performed by: HOSPITALIST

## 2018-06-05 PROCEDURE — A9270 NON-COVERED ITEM OR SERVICE: HCPCS | Performed by: HOSPITALIST

## 2018-06-05 PROCEDURE — 94760 N-INVAS EAR/PLS OXIMETRY 1: CPT

## 2018-06-05 PROCEDURE — 36415 COLL VENOUS BLD VENIPUNCTURE: CPT

## 2018-06-05 PROCEDURE — A9270 NON-COVERED ITEM OR SERVICE: HCPCS | Performed by: INTERNAL MEDICINE

## 2018-06-05 PROCEDURE — 700101 HCHG RX REV CODE 250: Performed by: HOSPITALIST

## 2018-06-05 PROCEDURE — 700102 HCHG RX REV CODE 250 W/ 637 OVERRIDE(OP): Performed by: HOSPITALIST

## 2018-06-05 PROCEDURE — 700111 HCHG RX REV CODE 636 W/ 250 OVERRIDE (IP): Performed by: HOSPITALIST

## 2018-06-05 PROCEDURE — 94640 AIRWAY INHALATION TREATMENT: CPT

## 2018-06-05 PROCEDURE — 770020 HCHG ROOM/CARE - TELE (206)

## 2018-06-05 PROCEDURE — 700102 HCHG RX REV CODE 250 W/ 637 OVERRIDE(OP): Performed by: INTERNAL MEDICINE

## 2018-06-05 PROCEDURE — 80048 BASIC METABOLIC PNL TOTAL CA: CPT

## 2018-06-05 PROCEDURE — 700111 HCHG RX REV CODE 636 W/ 250 OVERRIDE (IP): Performed by: INTERNAL MEDICINE

## 2018-06-05 RX ORDER — ASPIRIN 81 MG/1
81 TABLET ORAL DAILY
Qty: 30 TAB | Refills: 0 | Status: SHIPPED
Start: 2018-06-06 | End: 2020-03-10

## 2018-06-05 RX ORDER — PREDNISONE 10 MG/1
TABLET ORAL
Qty: 11 TAB | Refills: 0 | Status: SHIPPED | OUTPATIENT
Start: 2018-06-05 | End: 2018-06-21

## 2018-06-05 RX ORDER — AZITHROMYCIN 250 MG/1
TABLET, FILM COATED ORAL
Qty: 3 TAB | Refills: 0 | Status: SHIPPED | OUTPATIENT
Start: 2018-06-06 | End: 2018-06-21

## 2018-06-05 RX ORDER — CLOPIDOGREL BISULFATE 75 MG/1
75 TABLET ORAL DAILY
Qty: 30 TAB | Refills: 0 | Status: SHIPPED | OUTPATIENT
Start: 2018-06-06 | End: 2019-01-11 | Stop reason: SDUPTHER

## 2018-06-05 RX ADMIN — LEVALBUTEROL HYDROCHLORIDE 0.63 MG: 0.63 SOLUTION RESPIRATORY (INHALATION) at 15:15

## 2018-06-05 RX ADMIN — LEVALBUTEROL HYDROCHLORIDE 0.63 MG: 0.63 SOLUTION RESPIRATORY (INHALATION) at 06:44

## 2018-06-05 RX ADMIN — LEVALBUTEROL HYDROCHLORIDE 0.63 MG: 0.63 SOLUTION RESPIRATORY (INHALATION) at 10:26

## 2018-06-05 RX ADMIN — AZITHROMYCIN 250 MG: 250 TABLET, FILM COATED ORAL at 05:09

## 2018-06-05 RX ADMIN — STANDARDIZED SENNA CONCENTRATE AND DOCUSATE SODIUM 2 TABLET: 8.6; 5 TABLET, FILM COATED ORAL at 17:27

## 2018-06-05 RX ADMIN — PREDNISONE 20 MG: 20 TABLET ORAL at 17:27

## 2018-06-05 RX ADMIN — PREDNISONE 20 MG: 20 TABLET ORAL at 05:10

## 2018-06-05 RX ADMIN — LEVALBUTEROL HYDROCHLORIDE 0.63 MG: 0.63 SOLUTION RESPIRATORY (INHALATION) at 18:49

## 2018-06-05 RX ADMIN — ATORVASTATIN CALCIUM 40 MG: 40 TABLET, FILM COATED ORAL at 17:27

## 2018-06-05 RX ADMIN — UMECLIDINIUM BROMIDE AND VILANTEROL TRIFENATATE 1 PUFF: 62.5; 25 POWDER RESPIRATORY (INHALATION) at 05:10

## 2018-06-05 RX ADMIN — ASPIRIN 81 MG: 81 TABLET, COATED ORAL at 05:10

## 2018-06-05 RX ADMIN — CLOPIDOGREL 75 MG: 75 TABLET, FILM COATED ORAL at 05:09

## 2018-06-05 RX ADMIN — TAMSULOSIN HYDROCHLORIDE 0.4 MG: 0.4 CAPSULE ORAL at 17:27

## 2018-06-05 RX ADMIN — ENOXAPARIN SODIUM 60 MG: 100 INJECTION SUBCUTANEOUS at 05:10

## 2018-06-05 ASSESSMENT — ENCOUNTER SYMPTOMS
SPUTUM PRODUCTION: 1
NEUROLOGICAL NEGATIVE: 1
MUSCULOSKELETAL NEGATIVE: 1
WHEEZING: 1
COUGH: 1
SHORTNESS OF BREATH: 1
CLAUDICATION: 0
ORTHOPNEA: 0
EYES NEGATIVE: 1
CONSTITUTIONAL NEGATIVE: 1
PALPITATIONS: 0
PND: 0
GASTROINTESTINAL NEGATIVE: 1

## 2018-06-05 ASSESSMENT — PAIN SCALES - GENERAL: PAINLEVEL_OUTOF10: 0

## 2018-06-05 NOTE — FACE TO FACE
Face to Face Supporting Documentation - Home Health    The encounter with this patient was in whole or in part the primary reason for home health admission.    Date of encounter:   Patient:                    MRN:                       YOB: 2018  Garrett Alvaerz  [id number]  8/8/1934     Home health to see patient for:  Skilled Nursing care for assessment, interventions & education    Skilled need for:  Exacerbation of Chronic Disease State acute mi, copd    Skilled nursing interventions to include:  Comment: exams, vitals    Homebound status evidenced by:  Needs the assistance of another person in order to leave the home. Leaving home requires a considerable and taxing effort. There is a normal inability to leave the home.    Community Physician to provide follow up care: Barbara Wilkins M.D.     Optional Interventions? No      I certify the face to face encounter for this home health care referral meets the CMS requirements and the encounter/clinical assessment with the patient was, in whole, or in part, for the medical condition(s) listed above, which is the primary reason for home health care. Based on my clinical findings: the service(s) are medically necessary, support the need for home health care, and the homebound criteria are met.  I certify that this patient has had a face to face encounter by myself.  Carmen Billingsley M.D. - NPI: [id number]

## 2018-06-05 NOTE — DISCHARGE PLANNING
Anticipated Discharge Disposition: D/C to Home with HH and O2    Action: LSW contacted [facility] and requested pt have a tank delivered to d/c with. Pt is already on service.    Barriers to Discharge: None.    Plan: Pt will d/c home with [facility] and O2 through [facility]. [facility] will drop a tank off within a few hours.

## 2018-06-05 NOTE — DISCHARGE PLANNING
ATTN: Case Management  RE: Referral for Home Health                We would like to take this opportunity to thank you for submitting a referral for your patient to continue care with [facility]. Our skilled team is dedicated to helping all patients recover and gain independence in the home setting.            As of 6/5/18, we have accepted the above patient into our service. A [facility] clinician will be out to see the patient within 48 hours to conduct our initial visit. If you have any questions or concerns regarding the patient’s transition to Home Health, please do not hesitate to contact us. We are open for referrals 7 days a week from 8AM to 5PM at [phone].      We look forward to collaborating with you,  [facility] Team

## 2018-06-05 NOTE — DISCHARGE PLANNING
Anticipated Discharge Disposition: D/C to Home with HH    Action: LSW met with pt at bedside to discuss HH referral and need for O2 at time of d/c. Pt is on service with [facility] and will need a tank to go home with. Pt has never had HH before and would like [facility]. Both choice forms signed. IMM provided.     Barriers to Discharge: O2 delivery, HH acceptance.    Plan: Await O2 eval, face-to-face, and order. Pt will d/c home with HH and O2 when medically clear.

## 2018-06-05 NOTE — DISCHARGE PLANNING
Received Choice form at 3217  Agency/Facility Name: [facility]  Referral sent per Choice form @ 0065

## 2018-06-05 NOTE — CARE PLAN
Problem: Fluid Volume:  Goal: Will maintain balanced intake and output  Outcome: PROGRESSING AS EXPECTED  Monitored pt intake and output. Encourage fluid intake.    Problem: Bowel/Gastric:  Goal: Will not experience complications related to bowel motility  Outcome: PROGRESSING AS EXPECTED  Will encourage stool softeners and ambulation.

## 2018-06-05 NOTE — RESPIRATORY CARE
"COPD EDUCATION by COPD CLINICAL EDUCATOR  (Phone: [phone])  6/4/2018 at 11:09 AM by Sakina Luna    Patient seen by Respiratory Education team to complete the final block of education.  This session discussed signs and symptoms of an exacerbation (flare-up), triggers that can create flare-ups, reiteration of the \"Action Plan\" to refer to daily which will help categorize their symptoms in order to utilize the appropriate therapy, breathing techniques used to treat acute symptoms, and oxygen safety. Smoking Cessation was discussed as appropriate to this patient. Question and answer session followed.  "

## 2018-06-05 NOTE — PROGRESS NOTES
[facility] Hospitalist Progress Note    Date of Service: 2018    Chief Complaint  83 y.o. male admitted 2018 with shortness of breath and generalized malaise with acute on chronic hypoxemic respiratory failure    Interval Problem Update  Feeling much better  Lazar placed for urinary retention  Denies cp, or sob  No cough today    Consultants/Specialty  cardiology    Disposition  Likely home with home health tomorrow      Review of Systems   Constitutional: Negative.    HENT: Negative.    Eyes: Negative.    Respiratory: Positive for cough, sputum production, shortness of breath and wheezing.    Cardiovascular: Negative for chest pain, palpitations, orthopnea, claudication, leg swelling and PND.   Gastrointestinal: Negative.    Genitourinary: Negative.    Musculoskeletal: Negative.    Skin: Negative.    Neurological: Negative.    Endo/Heme/Allergies: Negative.       Physical Exam  Laboratory/Imaging   Hemodynamics  Temp (24hrs), Av.2 °C (98.9 °F), Min:36.8 °C (98.2 °F), Max:37.7 °C (99.9 °F)   Temperature: 37.4 °C (99.3 °F)  Pulse  Av  Min: 10  Max: 118   Blood Pressure : 149/87      Respiratory      Respiration: 18, Pulse Oximetry: 91 %, O2 Daily Delivery Respiratory : Silicone Nasal Cannula   RR currently 18  Given By:: Mouthpiece, Work Of Breathing / Effort: Mild  RUL Breath Sounds: Expiratory Wheezes, RML Breath Sounds: Expiratory Wheezes, RLL Breath Sounds: Diminished, JEFRY Breath Sounds: Expiratory Wheezes, LLL Breath Sounds: Diminished    Fluids    Intake/Output Summary (Last 24 hours) at 18 1343  Last data filed at 18 0600   Gross per 24 hour   Intake                0 ml   Output              750 ml   Net             -750 ml       Nutrition  Orders Placed This Encounter   Procedures   • Diet Order     Standing Status:   Standing     Number of Occurrences:   1     Order Specific Question:   Diet:     Answer:   Cardiac [6]     Physical Exam   Constitutional: He is oriented to person,  place, and time. No distress.   HENT:   Head: Normocephalic and atraumatic.   Mouth/Throat: Oropharynx is clear and moist. No oropharyngeal exudate.   Eyes: Conjunctivae are normal.   Neck: No JVD present. No tracheal deviation present.   Cardiovascular: Normal rate, regular rhythm, normal heart sounds and intact distal pulses.  Exam reveals no gallop and no friction rub.    No murmur heard.  Pulmonary/Chest: Effort normal. No stridor. No respiratory distress. He has wheezes (much improved with improved air mvt). He has rales. He exhibits no tenderness.   Abdominal: Soft. Bowel sounds are normal. He exhibits no distension and no mass. There is no tenderness. There is no rebound and no guarding.   Musculoskeletal: Normal range of motion. He exhibits no edema, tenderness or deformity.   Neurological: He is alert and oriented to person, place, and time. He has normal reflexes. No cranial nerve deficit. He exhibits normal muscle tone. Coordination normal.   Skin: Skin is warm and dry. No rash noted. He is not diaphoretic. No erythema. No pallor.   Psychiatric: He has a normal mood and affect. His behavior is normal. Judgment and thought content normal.   Nursing note and vitals reviewed.      Recent Labs      06/02/18   1723  06/03/18   0218  06/05/18   0436   WBC  9.0  6.3  5.5   RBC  4.39*  4.14*  3.16*   HEMOGLOBIN  13.7*  12.8*  10.0*   HEMATOCRIT  45.4  43.7  32.1*   MCV  103.4*  105.6*  100.0*   MCH  31.2  30.9  31.6   MCHC  30.2*  29.3*  31.6*   RDW  49.1  49.4  46.7   PLATELETCT  178  152*  134*   MPV  9.9  10.3  10.3     Recent Labs      06/03/18   0218  06/04/18   0206  06/05/18   0436   SODIUM  139  135  135   POTASSIUM  5.5  4.9  5.2   CHLORIDE  94*  92*  92*   CO2  40*  38*  42*   GLUCOSE  140*  137*  105*   BUN  28*  42*  26*   CREATININE  0.72  1.14  0.67   CALCIUM  9.3  9.2  9.0     Recent Labs      06/02/18   2030   APTT  29.3   INR  1.13     Recent Labs      06/02/18   1723  06/03/18   0218    BNPBTYPENAT  670*  997*     Recent Labs      06/03/18   0218   TRIGLYCERIDE  54   HDL  92   LDL  100*          Assessment/Plan     * COPD with acute exacerbation (HCC)- (present on admission)   Assessment & Plan    Continues to improve  Continue oral steroid taper        PAH (pulmonary artery hypertension) (HCC)- (present on admission)   Assessment & Plan    No significant changes on echo        Renal failure   Assessment & Plan    Mild hydronephrosis, no evidence of ureteral obstruction  Urinary retention  Lazar placed  Renal function improved  Continue to follow          Sepsis associated hypotension (HCC)- (present on admission)   Assessment & Plan    This is sepsis (without associated acute organ dysfunction).   Etiology is likely bronchitis or pneumonitis  Continue unasyn and azithromycin  Mild hypotension has resolved.  Much improved        Lactic acidosis- (present on admission)   Assessment & Plan    resolved        Troponin level elevated- (present on admission)   Assessment & Plan    No chest pain  Suspect demand related ischemia  Cardiology following  Plavix started        Acute on chronic respiratory failure with hypoxia (HCC)- (present on admission)   Assessment & Plan    Oxygen demand decreasing  No PE on CT scan          Quality-Core Measures

## 2018-06-05 NOTE — DISCHARGE SUMMARY
Discharge Summary    CHIEF COMPLAINT ON ADMISSION  Chief Complaint   Patient presents with   • Shortness of Breath     x 1-2 weeks, uses 6L via nasal cannula on condenser at home   • Weakness     x4-5 days       Reason for Admission  EMS     Admission Date  6/2/2018    CODE STATUS  Full Code    HPI & HOSPITAL COURSE  Please see history and physical dictated by Dr. Gaytan on 6/2/18 for further details.  This is a 83 y.o. male here with shortness of breath and acute on chronic hypoxic respiratory failure.  He was found to have a COPD exacerbation with suspected bronchitis and a STEMI, likely due to demand ischemia.    He responded well to empiric steroids and antibiotics and is now back at his baseline oxygen requirement of 2 liters, with a greatly improved exam.  He was followed by cardiology and started on aspirin and plavix with plans to follow up as an outpatient.  He was also found to have mild bilateral hydronephrosis and urinary retention.  The mild resulting renal failure resolved with placement of a sanders.  He will follow up with urology as an outpatient for re evaluation.  He also agrees to follow up with his pcp for his chronic anemia and other issues.  He did have evidence of C02 retention after, this coincided with excess supplemental oxygen, once oxygen was titrated down to 1.5- 2 L for a goal of a pulse ox of 88-90%, his CO2 decreased.  This goal of 88-90% was discussed with patient, he also agrees to have a repeat bmp next week with his pcp.       Therefore, he is discharged in fair and stable condition to home with close outpatient follow-up.    The patient met 2-midnight criteria for an inpatient stay at the time of discharge.    Discharge Date  6/6/18    FOLLOW UP ITEMS POST DISCHARGE  PCP  CARDIOLOGY  UROLOGY    DISCHARGE DIAGNOSES  Principal Problem:    COPD with acute exacerbation (HCC) POA: Yes  Active Problems:    PAH (pulmonary artery hypertension) (HCC) POA: Yes    Acute on chronic  respiratory failure with hypoxia (HCC) POA: Yes  Resolved Problems:    Troponin level elevated POA: Yes    Lactic acidosis POA: Yes    Sepsis associated hypotension (HCC) POA: Yes    Renal failure POA: Unknown    Physical Exam   Constitutional: He is oriented to person, place, and time. No distress.   HENT:   Head: Normocephalic and atraumatic.   Mouth/Throat: Oropharynx is clear and moist. No oropharyngeal exudate.   Eyes: Conjunctivae are normal.   Neck: No JVD present. No tracheal deviation present.   Cardiovascular: Normal rate, regular rhythm, normal heart sounds and intact distal pulses.  Exam reveals no gallop and no friction rub.    No murmur heard.  Pulmonary/Chest: Effort normal. No stridor. No respiratory distress. Decreased bs bilaterally. He exhibits no tenderness.   Abdominal: Soft. Bowel sounds are normal. He exhibits no distension and no mass. There is no tenderness. There is no rebound and no guarding.   Musculoskeletal: Normal range of motion. He exhibits no edema, tenderness or deformity.   Neurological: He is alert and oriented to person, place, and time. He has normal reflexes. No cranial nerve deficit. He exhibits normal muscle tone. Coordination normal.   Skin: Skin is warm and dry. No rash noted. He is not diaphoretic. No erythema. No pallor.   Psychiatric: He has a normal mood and affect. His behavior is normal. Judgment and thought content normal.   Nursing note and vitals reviewed    FOLLOW UP  Future Appointments  Date Time Provider Department Center   6/7/2018 3:00 PM PFT-RM1 P[facility] None   6/7/2018 5:00 PM JONES AgostoPFLORENCIA. PULM None   6/21/2018 3:00 PM MELONIE العلي. [facility] None     Barbara Wilkins M.D.  [address] [facility] [address]  [facility] [address]  [phone]    Go on 6/8/2018  Please arrive at 9:10 am for your appointment. Thank you     UROLOGY [address]  [place] [id number] [phone]  Call  To schedule an appointment         In  1 week      Barbara Wilkins M.D.  [address] [facility] [address]  [place] [address]  [phone]    In 1 week        MEDICATIONS ON DISCHARGE     Medication List      START taking these medications      Instructions   aspirin 81 MG EC tablet  Start taking on:  6/6/2018   Take 1 Tab by mouth every day.  Dose:  81 mg     azithromycin 250 MG Tabs  Start taking on:  6/6/2018  Commonly known as:  ZITHROMAX   Po daily     clopidogrel 75 MG Tabs  Start taking on:  6/6/2018  Commonly known as:  PLAVIX   Take 1 Tab by mouth every day.  Dose:  75 mg     predniSONE 10 MG Tabs  Commonly known as:  DELTASONE   10 mg po bid x 3 days, then 10 mg po daily x 3 days then 5 mg po daily x 3 days then stop        CONTINUE taking these medications      Instructions   albuterol 108 (90 Base) MCG/ACT Aers inhalation aerosol   Inhale 2 Puffs by mouth every 6 hours as needed for Shortness of Breath.  Dose:  2 Puff     fluticasone-salmeterol 500-50 MCG/DOSE Aepb  Commonly known as:  ADVAIR   Inhale 1 Puff by mouth every 12 hours. Rinse mouth after use  Dose:  1 Puff     LIPITOR 10 MG Tabs  Generic drug:  atorvastatin   Take 10 mg by mouth every evening.  Dose:  10 mg     lisinopril 20 MG Tabs  Commonly known as:  PRINIVIL   Take 1 Tab by mouth 2 times a day.  Dose:  20 mg     tamsulosin 0.4 MG capsule  Commonly known as:  FLOMAX   Take 1 Cap by mouth every bedtime.  Dose:  0.4 mg     tiotropium 18 MCG Caps  Commonly known as:  SPIRIVA   Inhale 18 mcg by mouth every day.  Dose:  18 mcg     umeclidinium-vilanterol 62.5-25 MCG/INH Aepb inhaler  Commonly known as:  ANORO ELLIPTA   Doctor's comments:  Please supply 30 day sample.  TY!  Inhale 1 Puff by mouth every day.  Dose:  1 Puff            Allergies  No Known Allergies    DIET  Orders Placed This Encounter   Procedures   • Diet Order     Standing Status:   Standing     Number of Occurrences:   1     Order Specific Question:   Diet:     Answer:   Cardiac [6]       ACTIVITY  As  tolerated.    CONSULTATIONS  Cardiology    PROCEDURES  US-RENAL   Final Result      Mild/moderate bilateral hydronephrosis.   Debris within the urinary bladder.   Bilateral ureteral jets within the urinary bladder as evidence against ureteral obstruction.      CT-CTA CHEST PULMONARY ARTERY W/ RECONS   Final Result      No evidence of pulmonary embolus.      Severe emphysema.      Atherosclerotic disease with coronary artery calcifications.      Bilateral hydronephrosis.            Echocardiogram Comp W/O Cont   Final Result      DX-CHEST-LIMITED (1 VIEW)   Final Result      1.  No evidence of acute cardiopulmonary process.      2.  COPD            LABORATORY  Lab Results   Component Value Date    SODIUM 135 06/05/2018    POTASSIUM 5.2 06/05/2018    CHLORIDE 92 (L) 06/05/2018    CO2 42 (HH) 06/05/2018    GLUCOSE 105 (H) 06/05/2018    BUN 26 (H) 06/05/2018    CREATININE 0.67 06/05/2018    CREATININE 1.1 12/26/2008        Lab Results   Component Value Date    WBC 5.5 06/05/2018    HEMOGLOBIN 10.0 (L) 06/05/2018    HEMATOCRIT 32.1 (L) 06/05/2018    PLATELETCT 134 (L) 06/05/2018        Total time of the discharge process exceeds 45 minutes.

## 2018-06-05 NOTE — PROGRESS NOTES
Assumed care of patient, bedside report received from ABRAM Vo. Call light within reach and fall precautions in place. Bed locked and in lowest position. Patient instructed to call if needing assistance. All questions answered, no other needs at this time.

## 2018-06-05 NOTE — CARE PLAN
Problem: Safety  Goal: Will remain free from falls  Outcome: PROGRESSING AS EXPECTED  Bed in lowest position with call light and bedside table within reach. Instructed patient to use call light for assistance,verbalized understanding.

## 2018-06-05 NOTE — PROGRESS NOTES
Received call from [place] in labs with a critical CO2. Notified on call hospitalist MD Franks. No new orders.

## 2018-06-06 VITALS
HEIGHT: 66 IN | BODY MASS INDEX: 18.42 KG/M2 | SYSTOLIC BLOOD PRESSURE: 130 MMHG | HEART RATE: 97 BPM | TEMPERATURE: 99.5 F | DIASTOLIC BLOOD PRESSURE: 94 MMHG | WEIGHT: 114.64 LBS | OXYGEN SATURATION: 93 % | RESPIRATION RATE: 18 BRPM

## 2018-06-06 LAB
ANION GAP SERPL CALC-SCNC: 2 MMOL/L (ref 0–11.9)
ANION GAP SERPL CALC-SCNC: 2 MMOL/L (ref 0–11.9)
BASOPHILS # BLD AUTO: 0 % (ref 0–1.8)
BASOPHILS # BLD: 0 K/UL (ref 0–0.12)
BUN SERPL-MCNC: 17 MG/DL (ref 8–22)
BUN SERPL-MCNC: 18 MG/DL (ref 8–22)
CALCIUM SERPL-MCNC: 9.6 MG/DL (ref 8.5–10.5)
CALCIUM SERPL-MCNC: 9.6 MG/DL (ref 8.5–10.5)
CHLORIDE SERPL-SCNC: 90 MMOL/L (ref 96–112)
CHLORIDE SERPL-SCNC: 90 MMOL/L (ref 96–112)
CO2 SERPL-SCNC: 40 MMOL/L (ref 20–33)
CO2 SERPL-SCNC: 44 MMOL/L (ref 20–33)
CREAT SERPL-MCNC: 0.62 MG/DL (ref 0.5–1.4)
CREAT SERPL-MCNC: 0.64 MG/DL (ref 0.5–1.4)
EOSINOPHIL # BLD AUTO: 0 K/UL (ref 0–0.51)
EOSINOPHIL NFR BLD: 0 % (ref 0–6.9)
ERYTHROCYTE [DISTWIDTH] IN BLOOD BY AUTOMATED COUNT: 47.5 FL (ref 35.9–50)
GLUCOSE SERPL-MCNC: 102 MG/DL (ref 65–99)
GLUCOSE SERPL-MCNC: 106 MG/DL (ref 65–99)
HCT VFR BLD AUTO: 36.8 % (ref 42–52)
HGB BLD-MCNC: 11.4 G/DL (ref 14–18)
IMM GRANULOCYTES # BLD AUTO: 0.01 K/UL (ref 0–0.11)
IMM GRANULOCYTES NFR BLD AUTO: 0.1 % (ref 0–0.9)
LYMPHOCYTES # BLD AUTO: 0.96 K/UL (ref 1–4.8)
LYMPHOCYTES NFR BLD: 12.4 % (ref 22–41)
MCH RBC QN AUTO: 31.2 PG (ref 27–33)
MCHC RBC AUTO-ENTMCNC: 31 G/DL (ref 33.7–35.3)
MCV RBC AUTO: 100.8 FL (ref 81.4–97.8)
MONOCYTES # BLD AUTO: 0.96 K/UL (ref 0–0.85)
MONOCYTES NFR BLD AUTO: 12.4 % (ref 0–13.4)
NEUTROPHILS # BLD AUTO: 5.84 K/UL (ref 1.82–7.42)
NEUTROPHILS NFR BLD: 75.1 % (ref 44–72)
NRBC # BLD AUTO: 0 K/UL
NRBC BLD-RTO: 0 /100 WBC
PLATELET # BLD AUTO: 159 K/UL (ref 164–446)
PMV BLD AUTO: 10.1 FL (ref 9–12.9)
POTASSIUM SERPL-SCNC: 4.7 MMOL/L (ref 3.6–5.5)
POTASSIUM SERPL-SCNC: 5.5 MMOL/L (ref 3.6–5.5)
RBC # BLD AUTO: 3.65 M/UL (ref 4.7–6.1)
SODIUM SERPL-SCNC: 132 MMOL/L (ref 135–145)
SODIUM SERPL-SCNC: 136 MMOL/L (ref 135–145)
WBC # BLD AUTO: 7.8 K/UL (ref 4.8–10.8)

## 2018-06-06 PROCEDURE — 94760 N-INVAS EAR/PLS OXIMETRY 1: CPT

## 2018-06-06 PROCEDURE — A9270 NON-COVERED ITEM OR SERVICE: HCPCS | Performed by: HOSPITALIST

## 2018-06-06 PROCEDURE — 85025 COMPLETE CBC W/AUTO DIFF WBC: CPT

## 2018-06-06 PROCEDURE — 700102 HCHG RX REV CODE 250 W/ 637 OVERRIDE(OP): Performed by: INTERNAL MEDICINE

## 2018-06-06 PROCEDURE — 700111 HCHG RX REV CODE 636 W/ 250 OVERRIDE (IP): Performed by: HOSPITALIST

## 2018-06-06 PROCEDURE — 36415 COLL VENOUS BLD VENIPUNCTURE: CPT

## 2018-06-06 PROCEDURE — 99239 HOSP IP/OBS DSCHRG MGMT >30: CPT | Performed by: HOSPITALIST

## 2018-06-06 PROCEDURE — 700101 HCHG RX REV CODE 250: Performed by: HOSPITALIST

## 2018-06-06 PROCEDURE — A9270 NON-COVERED ITEM OR SERVICE: HCPCS | Performed by: INTERNAL MEDICINE

## 2018-06-06 PROCEDURE — 700102 HCHG RX REV CODE 250 W/ 637 OVERRIDE(OP): Performed by: HOSPITALIST

## 2018-06-06 PROCEDURE — 94640 AIRWAY INHALATION TREATMENT: CPT

## 2018-06-06 PROCEDURE — 80048 BASIC METABOLIC PNL TOTAL CA: CPT | Mod: 91

## 2018-06-06 RX ADMIN — CLOPIDOGREL 75 MG: 75 TABLET, FILM COATED ORAL at 06:29

## 2018-06-06 RX ADMIN — UMECLIDINIUM BROMIDE AND VILANTEROL TRIFENATATE 1 PUFF: 62.5; 25 POWDER RESPIRATORY (INHALATION) at 06:31

## 2018-06-06 RX ADMIN — ASPIRIN 81 MG: 81 TABLET, COATED ORAL at 06:28

## 2018-06-06 RX ADMIN — LEVALBUTEROL HYDROCHLORIDE 0.63 MG: 0.63 SOLUTION RESPIRATORY (INHALATION) at 10:08

## 2018-06-06 RX ADMIN — LEVALBUTEROL HYDROCHLORIDE 0.63 MG: 0.63 SOLUTION RESPIRATORY (INHALATION) at 06:34

## 2018-06-06 RX ADMIN — ENOXAPARIN SODIUM 40 MG: 100 INJECTION SUBCUTANEOUS at 06:31

## 2018-06-06 RX ADMIN — PREDNISONE 20 MG: 20 TABLET ORAL at 06:29

## 2018-06-06 RX ADMIN — AZITHROMYCIN 250 MG: 250 TABLET, FILM COATED ORAL at 06:30

## 2018-06-06 ASSESSMENT — PAIN SCALES - GENERAL
PAINLEVEL_OUTOF10: 0
PAINLEVEL_OUTOF10: 0

## 2018-06-06 NOTE — PROGRESS NOTES
Discharge Summary    Educated patient on new medication, up coming appointments, and s/s to look for when returning to the emergency room. Home O2 delivered to patient and educated. Patient verbalized understanding. PIV removed with no s/s of bleeding or infection at site. Vitals WNL. Escorted patient downstairs via wheelchair to meet family for discharge home.

## 2018-06-06 NOTE — CARE PLAN
Problem: Respiratory:  Goal: Respiratory status will improve  Outcome: PROGRESSING AS EXPECTED  Patient maintaining O2 saturation between 88-92%. No complaints of SOB or VIELKA at this time.     Problem: Communication  Goal: The ability to communicate needs accurately and effectively will improve  Outcome: PROGRESSING AS EXPECTED  Explained for patient to use call light for any and all patient needs.

## 2018-06-06 NOTE — DISCHARGE INSTRUCTIONS
Discharge Instructions    Discharged to home by car with relative. Discharged via wheelchair, hospital escort: Yes.  Special equipment needed: Oxygen    Be sure to schedule a follow-up appointment with your primary care doctor or any specialists as instructed.     Discharge Plan:   Influenza Vaccine Indication: Not indicated: Previously immunized this influenza season and > 8 years of age    I understand that a diet low in cholesterol, fat, and sodium is recommended for good health. Unless I have been given specific instructions below for another diet, I accept this instruction as my diet prescription.   Other diet: low sodium    Special Instructions: None    · Is patient discharged on Warfarin / Coumadin?   No       Pulmonary Hypertension  Pulmonary hypertension is high blood pressure within the arteries in your lungs (pulmonary arteries). It is different than having high blood pressure elsewhere in your body, such as blood pressure that is measured with a blood pressure cuff. Pulmonary hypertension makes it harder for blood to flow through the lungs. As a result, the heart must work harder to pump blood through the lungs, and it may be harder for you to breathe. Over time, this can weaken the heart muscle. Pulmonary hypertension is a serious condition and it can be fatal.  What are the causes?  Many different medical conditions can cause pulmonary hypertension. Pulmonary hypertension can be categorized by cause into five groups:  Group 1   Pulmonary hypertension that is caused by abnormal growth of small blood vessels in the lungs (pulmonary arterial hypertension). The abnormal blood vessel growth may have no known cause, or it may be:  · Passed along from a parent (hereditary).  · Caused by another disease, such as a connective tissue disease (including lupus or scleroderma) or HIV.  · Caused by certain drugs or toxins.  Group 2   Pulmonary hypertension that is caused by weakness of the main chamber of the heart  (left ventricle) or heart valve disease.  Group 3   Pulmonary hypertension that is caused by lung disease or low oxygen levels. Causes in this group include:  · Emphysema or chronic obstructive pulmonary disease (COPD).  · Untreated sleep apnea.  · Pulmonary fibrosis.  Group 4   Pulmonary hypertension that is caused by blood clots in the lungs (pulmonary emboli).  Group 5   Other causes of pulmonary hypertension, such as sickle cell anemia, or a mix of multiple causes.  What are the signs or symptoms?  Symptoms of this condition include:  · Shortness of breath. You may notice shortness of breath with:  ¨ Activity, such as walking.  ¨ No activity.  · Tiredness and fatigue.  · Dizziness or fainting.  · Rapid heartbeat or feeling your heart flutter or skip a beat (palpitations).  · Neck vein enlargement.  · Bluish color to your lips and fingertips.  How is this diagnosed?  This condition may be diagnosed by:  · Chest X-ray.  · Arterial blood gases. This test checks the acidity of your blood as well as your blood oxygen and carbon dioxide levels.  · CT scan. This test can provide detailed images of your lungs.  · Pulmonary function test. This test measures how much air your lungs can hold. It also tests how well air moves in and out of your lungs.  · Electrocardiogram (ECG). This test traces the electrical activity of your heart.  · Echocardiogram. This test is used to look at your heart in motion and check how it is functioning.  · Heart catheterization. This test can measure the pressure in your pulmonary artery and the right side of your heart.  · Lung biopsy. This procedure involves checking a sample of lung tissue to find underlying causes.  How is this treated?  There is no cure for pulmonary hypertension, but treatment can help to relieve symptoms and slow the progress of the condition. Treatment can involve:  · Medicines, such as:  ¨ Blood pressure medicines.  ¨ Medicines to relax (dilate) the pulmonary blood  vessels.  ¨ Water pills to get rid of extra fluid (diuretic medicines).  ¨ Blood-thinning medicines.  · Surgery. For severe pulmonary hypertension that does not respond to medical treatment, heart-lung or lung transplant may be needed.  Follow these instructions at home:  · Take medicines only as directed by your health care provider. These include over-the-counter medicines and prescription medicines. Take all medicines exactly as instructed. Do not change or stop medicines without first checking with your health care provider.  · Do not smoke. If you need help quitting, ask your health care provider.  · Eat a healthy diet.  · Limit your salt (sodium) intake to less than 2,300 mg per day.  · Stay as active as possible. Exercise as directed by your health care provider. Talk with your health care provider about what type of exercise is safe for you.  · Avoid high altitudes.  · Avoid hot tubs and saunas.  · Avoid becoming pregnant, if this applies. Talk with your health care provider about safe methods of birth control.  · Keep all follow-up visits as directed by your health care provider. This is important.  Get help right away if:  · You have severe shortness of breath.  · You develop chest pain or pressure in your chest.  · You cough up blood.  · You develop swelling of your feet or legs.  · You have a significant increase in weight within 1-2 days.  This information is not intended to replace advice given to you by your health care provider. Make sure you discuss any questions you have with your health care provider.  Document Released: 10/14/2008 Document Revised: 07/07/2017 Document Reviewed: 06/09/2014  [employer] Interactive Patient Education © 2017 [employer]  Chronic Respiratory Failure  Respiratory failure is when your lungs are not working well and your breathing (respiratory) system fails. When respiratory failure occurs, it is difficult for your lungs to get enough oxygen or get rid of carbon dioxide  or both. Respiratory failure can be life threatening.  Respiratory failure can be acute or chronic. Acute respiratory failure is sudden, severe, and requires emergency medical treatment. Chronic respiratory failure is less severe, happens over time, and requires ongoing treatment.  What are the causes?  Any problem affecting the heart or lungs can cause respiratory failure. Some of these causes may be:  · Chronic bronchitis and emphysema (COPD).  · Blood clot going to the lung (pulmonary embolism).  · Having water in the lungs caused by heart failure, lung injury, or infection (pulmonary edema).  · Collapsed lung (pneumothorax).  · Pneumonia.  · Pulmonary fibrosis.  · Obesity.  · Asthma.  · Heart failure.  · Any type of trauma to the chest that can make breathing difficult.  · Nerve or muscle diseases making chest movements difficult.  What are the signs or symptoms?  Signs and symptoms of chronic respiratory failure include:  · Shortness of breath (dyspnea) with or without activity.  · Rapid, fast breathing (tachypnea).  · Wheezing.  · Fast heart rate.  · Bluish color to the fingernail or toenail beds.  · Confusion or drowsiness or both.  How is this diagnosed?  Initial diagnosis requires a thorough history and a physical exam by your health care provider. Additional tests may include:  · Chest X-ray.  · CT scan of your lungs.  · Ultrasound to check for blood clots.  · Blood tests, such as an arterial blood gas test (ABG). This is a blood test that looks at the oxygen and carbon dioxide levels in your arterial blood.  · Your vital signs will be taken. This includes your respiratory rate (how many times a minute you are breathing), oxygen saturation (this measures the oxygen level in your blood), heart rate, and blood pressure. These numbers help your health care provider determine the next steps.  · Electrocardiogram.  How is this treated?  Treatment of chronic respiratory failure depends on the cause of the  respiratory failure. Treatment can include the following:  · Oxygen. Oxygen can be delivered through the following:  ¨ Nasal cannula. This is small tubing that goes in your nose to give you oxygen.  ¨ Face mask. A face mask covers your nose and mouth to give you oxygen.  · Medicine. Different medicines can be given to help with breathing. These can include:  ¨ Nebulizers. Nebulizers deliver medicines to open the air passages (bronchodilators). These medicines help to open or relax the airways in the lungs so you can breathe better. They can also help loosen mucus from your lungs.  ¨ Diuretics. Diuretic medicines can help you breathe better by getting rid of extra fluid in your body.  ¨ Steroids. Steroid medicines can help decrease inflammation in your lungs.  · Chest tube. If you have a collapsed lung (pneumothorax), a chest tube is placed to help reinflate the lung.  · Noninvasive positive pressure ventilation (NPPV). This is a tight-fitting mask that goes over your nose and mouth. The mask has tubing that is attached to a machine. The machine blows air into the tubing, which helps to keep the tiny air sacs (alveoli) in your lungs open. This machine allows you to breathe on your own.  · Ventilator. A ventilator is a breathing machine. When on a ventilator, a breathing tube is put into the lungs. A ventilator is used when you can no longer breathe well enough on your own. You may have low oxygen levels or high carbon dioxide (CO2) levels in your blood. When you are on a ventilator, sedation and pain medicines are given to make you sleep so your lungs can heal.  Follow these instructions at home:  · Follow your health care provider's directions about medicines and respiratory therapy.  · Quit smoking if you smoke.  Contact a health care provider if:  · You have increasing shortness of breath and are less functional than you have been.  · You have increased sputum, wheezing, coughing, or loss of energy.  · You are on  oxygen and are requiring more.  Get help right away if:  · Your shortness of breath is significantly worse.  · You are unable to say more than a few words without having to catch your breath.  · You are much less functional.  This information is not intended to replace advice given to you by your health care provider. Make sure you discuss any questions you have with your health care provider.  Document Released: 12/18/2006 Document Revised: 05/25/2017 Document Reviewed: 10/16/2014  [employer] Interactive Patient Education © 2017 [employer]  Prednisone tablets  What is this medicine?  PREDNISONE (PRED ni sone) is a corticosteroid. It is commonly used to treat inflammation of the skin, joints, lungs, and other organs. Common conditions treated include asthma, allergies, and arthritis. It is also used for other conditions, such as blood disorders and diseases of the adrenal glands.  This medicine may be used for other purposes; ask your health care provider or pharmacist if you have questions.  COMMON BRAND NAME(S): Deltasone, Predone, Sterapred, Sterapred DS  What should I tell my health care provider before I take this medicine?  They need to know if you have any of these conditions:  -Cushing's syndrome  -diabetes  -glaucoma  -heart disease  -high blood pressure  -infection (especially a virus infection such as chickenpox, cold sores, or herpes)  -kidney disease  -liver disease  -mental illness  -myasthenia gravis  -osteoporosis  -seizures  -stomach or intestine problems  -thyroid disease  -an unusual or allergic reaction to lactose, prednisone, other medicines, foods, dyes, or preservatives  -pregnant or trying to get pregnant  -breast-feeding  How should I use this medicine?  Take this medicine by mouth with a glass of water. Follow the directions on the prescription label. Take this medicine with food. If you are taking this medicine once a day, take it in the morning. Do not take more medicine than you are  told to take. Do not suddenly stop taking your medicine because you may develop a severe reaction. Your doctor will tell you how much medicine to take. If your doctor wants you to stop the medicine, the dose may be slowly lowered over time to avoid any side effects.  Talk to your pediatrician regarding the use of this medicine in children. Special care may be needed.  Overdosage: If you think you have taken too much of this medicine contact a poison control center or emergency room at once.  NOTE: This medicine is only for you. Do not share this medicine with others.  What if I miss a dose?  If you miss a dose, take it as soon as you can. If it is almost time for your next dose, talk to your doctor or health care professional. You may need to miss a dose or take an extra dose. Do not take double or extra doses without advice.  What may interact with this medicine?  Do not take this medicine with any of the following medications:  -metyrapone  -mifepristone  This medicine may also interact with the following medications:  -aminoglutethimide  -amphotericin B  -aspirin and aspirin-like medicines  -barbiturates  -certain medicines for diabetes, like glipizide or glyburide  -cholestyramine  -cholinesterase inhibitors  -cyclosporine  -digoxin  -diuretics  -ephedrine  -female hormones, like estrogens and birth control pills  -isoniazid  -ketoconazole  -NSAIDS, medicines for pain and inflammation, like ibuprofen or naproxen  -phenytoin  -rifampin  -toxoids  -vaccines  -warfarin  This list may not describe all possible interactions. Give your health care provider a list of all the medicines, herbs, non-prescription drugs, or dietary supplements you use. Also tell them if you smoke, drink alcohol, or use illegal drugs. Some items may interact with your medicine.  What should I watch for while using this medicine?  Visit your doctor or health care professional for regular checks on your progress. If you are taking this  medicine over a prolonged period, carry an identification card with your name and address, the type and dose of your medicine, and your doctor's name and address.  This medicine may increase your risk of getting an infection. Tell your doctor or health care professional if you are around anyone with measles or chickenpox, or if you develop sores or blisters that do not heal properly.  If you are going to have surgery, tell your doctor or health care professional that you have taken this medicine within the last twelve months.  Ask your doctor or health care professional about your diet. You may need to lower the amount of salt you eat.  This medicine may affect blood sugar levels. If you have diabetes, check with your doctor or health care professional before you change your diet or the dose of your diabetic medicine.  What side effects may I notice from receiving this medicine?  Side effects that you should report to your doctor or health care professional as soon as possible:  -allergic reactions like skin rash, itching or hives, swelling of the face, lips, or tongue  -changes in emotions or moods  -changes in vision  -depressed mood  -eye pain  -fever or chills, cough, sore throat, pain or difficulty passing urine  -increased thirst  -swelling of ankles, feet  Side effects that usually do not require medical attention (report to your doctor or health care professional if they continue or are bothersome):  -confusion, excitement, restlessness  -headache  -nausea, vomiting  -skin problems, acne, thin and shiny skin  -trouble sleeping  -weight gain  This list may not describe all possible side effects. Call your doctor for medical advice about side effects. You may report side effects to FDA at 7-145-FDA-6856.  Where should I keep my medicine?  Keep out of the reach of children.  Store at room temperature between 15 and 30 degrees C (59 and 86 degrees F). Protect from light. Keep container tightly closed. Throw away  any unused medicine after the expiration date.  NOTE: This sheet is a summary. It may not cover all possible information. If you have questions about this medicine, talk to your doctor, pharmacist, or health care provider.  © 2018 [employer] (2012-08-02 10:57:14)  Clopidogrel tablets  What is this medicine?  CLOPIDOGREL (kloh PID oh grel) helps to prevent blood clots. This medicine is used to prevent heart attack, stroke, or other vascular events in people who are at high risk.  This medicine may be used for other purposes; ask your health care provider or pharmacist if you have questions.  COMMON BRAND NAME(S): Plavix  What should I tell my health care provider before I take this medicine?  They need to know if you have any of the following conditions:  -bleeding disorders  -bleeding in the brain  -having surgery  -history of stomach bleeding  -an unusual or allergic reaction to clopidogrel, other medicines, foods, dyes, or preservatives  -pregnant or trying to get pregnant  -breast-feeding  How should I use this medicine?  Take this medicine by mouth with a glass of water. Follow the directions on the prescription label. You may take this medicine with or without food. If it upsets your stomach, take it with food. Take your medicine at regular intervals. Do not take it more often than directed. Do not stop taking except on your doctor's advice.  A special MedGuide will be given to you by the pharmacist with each prescription and refill. Be sure to read this information carefully each time.  Talk to your pediatrician regarding the use of this medicine in children. Special care may be needed.  Overdosage: If you think you have taken too much of this medicine contact a poison control center or emergency room at once.  NOTE: This medicine is only for you. Do not share this medicine with others.  What if I miss a dose?  If you miss a dose, take it as soon as you can. If it is almost time for your next dose,  take only that dose. Do not take double or extra doses.  What may interact with this medicine?  Do not take this medicine with the following medications:  -dasabuvir; ombitasvir; paritaprevir; ritonavir  -defibrotide  This medicine may also interact with the following medications:  -antiviral medicines for HIV or AIDS  -aspirin  -certain medicines for depression like citalopram, fluoxetine, fluvoxamine  -certain medicines for fungal infections like ketoconazole, fluconazole, voriconazole  -certain medicines for seizures like felbamate, oxcarbazepine, phenytoin  -certain medicines for stomach problems like cimetidine, omeprazole, esomeprazole  -certain medicines that treat or prevent blood clots like warfarin, enoxaparin, dalteparin, apixaban, dabigatran, rivaroxaban, ticlopidine  -chloramphenicol  -cilostazol  -fluvastatin  -isoniazid  -modafinil  -nicardipine  -NSAIDS, medicines for pain and inflammation, like ibuprofen or naproxen  -quinine  -repaglinide  -tamoxifen  -tolbutamide  -topiramate  -torsemide  This list may not describe all possible interactions. Give your health care provider a list of all the medicines, herbs, non-prescription drugs, or dietary supplements you use. Also tell them if you smoke, drink alcohol, or use illegal drugs. Some items may interact with your medicine.  What should I watch for while using this medicine?  Visit your doctor or health care professional for regular check ups. Do not stop taking your medicine unless your doctor tells you to.  Notify your doctor or health care professional and seek emergency treatment if you develop breathing problems; changes in vision; chest pain; severe, sudden headache; pain, swelling, warmth in the leg; trouble speaking; sudden numbness or weakness of the face, arm or leg. These can be signs that your condition has gotten worse.  If you are going to have surgery or dental work, tell your doctor or health care professional that you are taking this  medicine.  Certain genetic factors may reduce the effect of this medicine. Your doctor may use genetic tests to determine treatment.  What side effects may I notice from receiving this medicine?  Side effects that you should report to your doctor or health care professional as soon as possible:  -allergic reactions like skin rash, itching or hives, swelling of the face, lips, or tongue  -signs and symptoms of bleeding such as bloody or black, tarry stools; red or dark-brown urine; spitting up blood or brown material that looks like coffee grounds; red spots on the skin; unusual bruising or bleeding from the eye, gums, or nose  -signs and symptoms of a blood clot such as breathing problems; changes in vision; chest pain; severe, sudden headache; pain, swelling, warmth in the leg; trouble speaking; sudden numbness or weakness of the face, arm or leg  Side effects that usually do not require medical attention (report to your doctor or health care professional if they continue or are bothersome):  -constipation  -diarrhea  -headache  -upset stomach  This list may not describe all possible side effects. Call your doctor for medical advice about side effects. You may report side effects to FDA at 2-291-FDA-7922.  Where should I keep my medicine?  Keep out of the reach of children.  Store at room temperature of 59 to 86 degrees F (15 to 30 degrees C). Throw away any unused medicine after the expiration date.  NOTE: This sheet is a summary. It may not cover all possible information. If you have questions about this medicine, talk to your doctor, pharmacist, or health care provider.  © 2018 [employer] (2016-09-22 10:00:44)  Azithromycin tablets  What is this medicine?  AZITHROMYCIN (az ith yuko MYE sin) is a macrolide antibiotic. It is used to treat or prevent certain kinds of bacterial infections. It will not work for colds, flu, or other viral infections.  This medicine may be used for other purposes; ask your  health care provider or pharmacist if you have questions.  COMMON BRAND NAME(S): Zithromax, Zithromax Tri-Hemal, Zithromax Z-Hemal  What should I tell my health care provider before I take this medicine?  They need to know if you have any of these conditions:  -kidney disease  -liver disease  -irregular heartbeat or heart disease  -an unusual or allergic reaction to azithromycin, erythromycin, other macrolide antibiotics, foods, dyes, or preservatives  -pregnant or trying to get pregnant  -breast-feeding  How should I use this medicine?  Take this medicine by mouth with a full glass of water. Follow the directions on the prescription label. The tablets can be taken with food or on an empty stomach. If the medicine upsets your stomach, take it with food. Take your medicine at regular intervals. Do not take your medicine more often than directed. Take all of your medicine as directed even if you think your are better. Do not skip doses or stop your medicine early.  Talk to your pediatrician regarding the use of this medicine in children. While this drug may be prescribed for children as young as 6 months for selected conditions, precautions do apply.  Overdosage: If you think you have taken too much of this medicine contact a poison control center or emergency room at once.  NOTE: This medicine is only for you. Do not share this medicine with others.  What if I miss a dose?  If you miss a dose, take it as soon as you can. If it is almost time for your next dose, take only that dose. Do not take double or extra doses.  What may interact with this medicine?  Do not take this medicine with any of the following medications:  -lincomycin  This medicine may also interact with the following medications:  -amiodarone  -antacids  -birth control pills  -cyclosporine  -digoxin  -magnesium  -nelfinavir  -phenytoin  -warfarin  This list may not describe all possible interactions. Give your health care provider a list of all the  medicines, herbs, non-prescription drugs, or dietary supplements you use. Also tell them if you smoke, drink alcohol, or use illegal drugs. Some items may interact with your medicine.  What should I watch for while using this medicine?  Tell your doctor or healthcare professional if your symptoms do not start to get better or if they get worse.  Do not treat diarrhea with over the counter products. Contact your doctor if you have diarrhea that lasts more than 2 days or if it is severe and watery.  This medicine can make you more sensitive to the sun. Keep out of the sun. If you cannot avoid being in the sun, wear protective clothing and use sunscreen. Do not use sun lamps or tanning beds/booths.  What side effects may I notice from receiving this medicine?  Side effects that you should report to your doctor or health care professional as soon as possible:  -allergic reactions like skin rash, itching or hives, swelling of the face, lips, or tongue  -confusion, nightmares or hallucinations  -dark urine  -difficulty breathing  -hearing loss  -irregular heartbeat or chest pain  -pain or difficulty passing urine  -redness, blistering, peeling or loosening of the skin, including inside the mouth  -white patches or sores in the mouth  -yellowing of the eyes or skin  Side effects that usually do not require medical attention (report to your doctor or health care professional if they continue or are bothersome):  -diarrhea  -dizziness, drowsiness  -headache  -stomach upset or vomiting  -tooth discoloration  -vaginal irritation  This list may not describe all possible side effects. Call your doctor for medical advice about side effects. You may report side effects to FDA at 0-023-FDA-5812.  Where should I keep my medicine?  Keep out of the reach of children.  Store at room temperature between 15 and 30 degrees C (59 and 86 degrees F). Throw away any unused medicine after the expiration date.  NOTE: This sheet is a summary. It  may not cover all possible information. If you have questions about this medicine, talk to your doctor, pharmacist, or health care provider.  © 2018 Elsevier/Gold Standard (2017-02-14 15:26:03)  Aspirin, ASA chewable tablets  What is this medicine?  ASPIRIN (AS pir in) is a pain reliever. It is used to treat mild pain and fever. This medicine is also used as directed by a doctor to prevent and to treat heart attacks, to prevent strokes, and to treat arthritis or inflammation.  This medicine may be used for other purposes; ask your health care provider or pharmacist if you have questions.  COMMON BRAND NAME(S): Mitzi Aspirin, IncentOne Children's Aspirin, iStorez Aspirin  What should I tell my health care provider before I take this medicine?  They need to know if you have any of these conditions:  -anemia  -asthma  -bleeding problems  -child with chickenpox, the flu, or other viral infection  -diabetes  -gout  -if you frequently drink alcohol containing drinks  -kidney disease  -liver disease  -low level of vitamin K  -lupus  -smoke tobacco  -stomach ulcers or other problems  -an unusual or allergic reaction to aspirin, tartrazine dye, other medicines, dyes, or preservatives  -pregnant or trying to get pregnant  -breast-feeding  How should I use this medicine?  Take this medicine by mouth. Chew it completely before swallowing. Follow the directions on the package or prescription label. Do not take your medicine more often than directed.  Talk to your pediatrician regarding the use of this medicine in children. While this drug may be prescribed for children as young as 12 years of age for selected conditions, precautions do apply. Children and teenagers should not use this medicine to treat chicken pox or flu symptoms unless directed by a doctor.  Patients over 65 years old may have a stronger reaction and need a smaller dose.  Overdosage: If you think you have taken too much of this medicine contact a poison control  center or emergency room at once.  NOTE: This medicine is only for you. Do not share this medicine with others.  What if I miss a dose?  If you are taking this medicine on a regular schedule and miss a dose, take it as soon as you can. If it is almost time for your next dose, take only that dose. Do not take double or extra doses.  What may interact with this medicine?  Do not take this medicine with any of the following medications:  -cidofovir  -ketorolac  -probenecid  This medicine may also interact with the following medications:  -alcohol  -alendronate  -bismuth subsalicylate  -flavocoxid  -herbal supplements like feverfew, garlic, paulette, ginkgo biloba, horse chestnut  -medicines for diabetes or glaucoma like acetazolamide, methazolamide  -medicines for gout  -medicines that treat or prevent blood clots like enoxaparin, heparin, ticlopidine, warfarin  -other aspirin and aspirin-like medicines  -NSAIDs, medicines for pain and inflammation, like ibuprofen or naproxen  -pemetrexed  -sulfinpyrazone  -varicella live vaccine  This list may not describe all possible interactions. Give your health care provider a list of all the medicines, herbs, non-prescription drugs, or dietary supplements you use. Also tell them if you smoke, drink alcohol, or use illegal drugs. Some items may interact with your medicine.  What should I watch for while using this medicine?  If you are treating yourself for pain, tell your doctor or health care professional if the pain lasts more than 10 days, if it gets worse, or if there is a new or different kind of pain. Tell your doctor if you see redness or swelling. Also, check with your doctor if you have a fever that lasts for more than 3 days. Only take this medicine to prevent heart attacks or blood clotting if prescribed by your doctor or health care professional.  Do not take aspirin or aspirin-like medicines with this medicine. Too much aspirin can be dangerous. Always read the labels  carefully.  This medicine can irritate your stomach or cause bleeding problems. Do not smoke cigarettes or drink alcohol while taking this medicine. Do not lie down for 30 minutes after taking this medicine to prevent irritation to your throat.  If you are scheduled for any medical or dental procedure, tell your healthcare provider that you are taking this medicine. You may need to stop taking this medicine before the procedure.  What side effects may I notice from receiving this medicine?  Side effects that you should report to your doctor or health care professional as soon as possible:  -allergic reactions like skin rash, itching or hives, swelling of the face, lips, or tongue  -breathing problems  -changes in hearing, ringing in the ears  -confusion  -general ill feeling or flu-like symptoms  -pain on swallowing  -redness, blistering, peeling or loosening of the skin, including inside the mouth or nose  -signs and symptoms of bleeding such as bloody or black, tarry stools; red or dark-brown urine; spitting up blood or brown material that looks like coffee grounds; red spots on the skin; unusual bruising or bleeding from the eye, gums, or nose  -trouble passing urine or change in the amount of urine  -unusually weak or tired  -yellowing of the eyes or skin  Side effects that usually do not require medical attention (report to your doctor or health care professional if they continue or are bothersome):  -diarrhea or constipation  -nausea, vomiting  -stomach gas, heartburn  This list may not describe all possible side effects. Call your doctor for medical advice about side effects. You may report side effects to FDA at 3-917-FDA-1955.  Where should I keep my medicine?  Keep out of the reach of children.  Store at room temperature between 15 and 30 degrees C (59 and 86 degrees F). Protect from heat and moisture. Do not use this medicine if it has a strong vinegar smell. Throw away any unused medicine after the  expiration date.  NOTE: This sheet is a summary. It may not cover all possible information. If you have questions about this medicine, talk to your doctor, pharmacist, or health care provider.  © 2018 Elsevier/Gold Standard (2014-04-09 15:34:27)    Depression / Suicide Risk    As you are discharged from this [facility] facility, it is important to learn how to keep safe from harming yourself.    Recognize the warning signs:  · Abrupt changes in personality, positive or negative- including increase in energy   · Giving away possessions  · Change in eating patterns- significant weight changes-  positive or negative  · Change in sleeping patterns- unable to sleep or sleeping all the time   · Unwillingness or inability to communicate  · Depression  · Unusual sadness, discouragement and loneliness  · Talk of wanting to die  · Neglect of personal appearance   · Rebelliousness- reckless behavior  · Withdrawal from people/activities they love  · Confusion- inability to concentrate     If you or a loved one observes any of these behaviors or has concerns about self-harm, here's what you can do:  · Talk about it- your feelings and reasons for harming yourself  · Remove any means that you might use to hurt yourself (examples: pills, rope, extension cords, firearm)  · Get professional help from the community (Mental Health, Substance Abuse, psychological counseling)  · Do not be alone:Call your Safe Contact- someone whom you trust who will be there for you.  · Call your local CRISIS HOTLINE 572-3193 or 008-803-6405  · Call your local Children's Mobile Crisis Response Team [place] (273) 277-8821 or www.[employer].com  · Call the toll free National Suicide Prevention Hotlines   · National Suicide Prevention Lifeline 832-235-ZMRN (3582)  · National Hope Line Network 800-SUICIDE (718-4761)

## 2018-06-06 NOTE — PROGRESS NOTES
Lab called with critical result of Co2 of 44 at 0555. Critical lab result read back to lab.   Dr. Rodriguez notified of critical lab result at 0602.  Critical lab result read back by Dr. rodriguez.

## 2018-06-06 NOTE — PROGRESS NOTES
Patient report received from off going RN. Patient A/O x4 currently in no acute distress. No complaints of CP, SOB, or AMS throughout the day. Currently placed on telemetry and is showing NSR @ 96 bpm. Bed set and locked in lowest position with call light in patient reach. Hourly rounding occurring.

## 2018-06-07 ENCOUNTER — APPOINTMENT (OUTPATIENT)
Dept: PULMONOLOGY | Facility: HOSPICE | Age: 83
End: 2018-06-07
Payer: MEDICARE

## 2018-06-07 ENCOUNTER — TELEPHONE (OUTPATIENT)
Dept: PULMONOLOGY | Facility: HOSPICE | Age: 83
End: 2018-06-07

## 2018-06-07 ENCOUNTER — PATIENT OUTREACH (OUTPATIENT)
Dept: HEALTH INFORMATION MANAGEMENT | Facility: OTHER | Age: 83
End: 2018-06-07

## 2018-06-07 LAB
BACTERIA BLD CULT: NORMAL
BACTERIA BLD CULT: NORMAL
SIGNIFICANT IND 70042: NORMAL
SIGNIFICANT IND 70042: NORMAL
SITE SITE: NORMAL
SITE SITE: NORMAL
SOURCE SOURCE: NORMAL
SOURCE SOURCE: NORMAL

## 2018-06-07 NOTE — TELEPHONE ENCOUNTER
Zoe the pt's daughter called wanting to know if the pt should keep appt for today, states that he was released from hospital yesterday and is still very weak and thin?    Last seen: 4/18/18- VALERIA Pérez  Next ov: 6/7/18  Chronic hypoxemic respiratory failure

## 2018-06-07 NOTE — TELEPHONE ENCOUNTER
"Zoe the pt's daughter notified of Doreen Albaon's last message, \"reschedule the appt for now and give the antibiotic enough time to work and to visit the UC if symptoms worsen.\"  Zoe states that the pt has an appt w/ his PCP tomorrow.  "

## 2018-06-08 ENCOUNTER — HOME CARE VISIT (OUTPATIENT)
Dept: HOME HEALTH SERVICES | Facility: HOME HEALTHCARE | Age: 83
End: 2018-06-08
Payer: MEDICARE

## 2018-06-08 ENCOUNTER — TELEPHONE (OUTPATIENT)
Dept: PULMONOLOGY | Facility: HOSPICE | Age: 83
End: 2018-06-08

## 2018-06-08 DIAGNOSIS — J44.9 CHRONIC OBSTRUCTIVE PULMONARY DISEASE, UNSPECIFIED COPD TYPE (HCC): ICD-10-CM

## 2018-06-08 PROCEDURE — G0493 RN CARE EA 15 MIN HH/HOSPICE: HCPCS

## 2018-06-08 PROCEDURE — 665001 SOC-HOME HEALTH

## 2018-06-08 SDOH — ECONOMIC STABILITY: HOUSING INSECURITY: UNSAFE COOKING RANGE AREA: 0

## 2018-06-08 SDOH — ECONOMIC STABILITY: HOUSING INSECURITY: UNSAFE APPLIANCES: 0

## 2018-06-08 ASSESSMENT — ACTIVITIES OF DAILY LIVING (ADL): OASIS_M1830: 02

## 2018-06-08 ASSESSMENT — ENCOUNTER SYMPTOMS
VOMITING: DENIES
NAUSEA: DENIES

## 2018-06-08 NOTE — TELEPHONE ENCOUNTER
Please sign updated rx for POC    Patients daughter requested this    I left her a detailed message that this is covered benefit through [facility]

## 2018-06-10 VITALS
SYSTOLIC BLOOD PRESSURE: 130 MMHG | OXYGEN SATURATION: 98 % | WEIGHT: 122 LBS | HEART RATE: 84 BPM | DIASTOLIC BLOOD PRESSURE: 70 MMHG | TEMPERATURE: 98.3 F | RESPIRATION RATE: 18 BRPM | HEIGHT: 65 IN | BODY MASS INDEX: 20.33 KG/M2

## 2018-06-10 SDOH — ECONOMIC STABILITY: HOUSING INSECURITY
HOME SAFETY: OXYGEN SAFETY RISK ASSESSMENT PERFORMED. PATIENT PT WAS GIVEN A NO SMOKING SIGN AND PROVIDED EDUCATION ABOUT WHY IT IS IMPORTANT TO PLACE ONE. PATIENT DOES HAVE A WORKING FIRE EXTINGUISHER PRESENT IN THE HOME. SMOKE ALARMS ARE PRESENT AND FUNCTIONAL

## 2018-06-10 SDOH — ECONOMIC STABILITY: HOUSING INSECURITY
HOME SAFETY: ON EACH LEVEL OF THE HOME. PATIENT DOES HAVE A FIRE ESCAPE PLAN DEVELOPED. PATIENT DOES NOT HAVE FLAMMABLE MATERIALS PRESENT IN THE HOME PRESENTING A FIRE HAZARD. NO EVIDENCE FOUND OF SMOKING MATERIALS PRESENT IN THE HOME.

## 2018-06-10 ASSESSMENT — PATIENT HEALTH QUESTIONNAIRE - PHQ9
1. LITTLE INTEREST OR PLEASURE IN DOING THINGS: 00
2. FEELING DOWN, DEPRESSED, IRRITABLE, OR HOPELESS: 00

## 2018-06-10 ASSESSMENT — ACTIVITIES OF DAILY LIVING (ADL): HOME_HEALTH_OASIS: 01

## 2018-06-11 ENCOUNTER — TELEPHONE (OUTPATIENT)
Dept: HEALTH INFORMATION MANAGEMENT | Facility: OTHER | Age: 83
End: 2018-06-11

## 2018-06-11 ENCOUNTER — PATIENT OUTREACH (OUTPATIENT)
Dept: HEALTH INFORMATION MANAGEMENT | Facility: OTHER | Age: 83
End: 2018-06-11

## 2018-06-11 NOTE — TELEPHONE ENCOUNTER
Referral from [facility]. Med review completed. Patient has multiple inhalers listed. Could not reach patient to clarify. Please document which inhalers patient is currently using. He should not be duplicating therapeutic classes.

## 2018-06-12 ENCOUNTER — HOME CARE VISIT (OUTPATIENT)
Dept: HOME HEALTH SERVICES | Facility: HOME HEALTHCARE | Age: 83
End: 2018-06-12
Payer: MEDICARE

## 2018-06-12 VITALS
TEMPERATURE: 98.1 F | OXYGEN SATURATION: 97 % | RESPIRATION RATE: 16 BRPM | DIASTOLIC BLOOD PRESSURE: 56 MMHG | SYSTOLIC BLOOD PRESSURE: 108 MMHG | HEART RATE: 72 BPM

## 2018-06-12 PROCEDURE — G0493 RN CARE EA 15 MIN HH/HOSPICE: HCPCS

## 2018-06-12 ASSESSMENT — ENCOUNTER SYMPTOMS
RESPIRATORY SYMPTOMS COMMENTS: NO CONCERNS AT TIME OF ASSESSMENT.
VOMITING: DENIES
NAUSEA: DENIES
SEVERE DYSPNEA: 1

## 2018-06-13 ENCOUNTER — HOME CARE VISIT (OUTPATIENT)
Dept: HOME HEALTH SERVICES | Facility: HOME HEALTHCARE | Age: 83
End: 2018-06-13
Payer: MEDICARE

## 2018-06-13 PROCEDURE — G0151 HHCP-SERV OF PT,EA 15 MIN: HCPCS

## 2018-06-14 ENCOUNTER — HOME CARE VISIT (OUTPATIENT)
Dept: HOME HEALTH SERVICES | Facility: HOME HEALTHCARE | Age: 83
End: 2018-06-14
Payer: MEDICARE

## 2018-06-14 VITALS
TEMPERATURE: 97.5 F | DIASTOLIC BLOOD PRESSURE: 70 MMHG | HEART RATE: 64 BPM | RESPIRATION RATE: 16 BRPM | SYSTOLIC BLOOD PRESSURE: 115 MMHG | OXYGEN SATURATION: 95 %

## 2018-06-14 SDOH — ECONOMIC STABILITY: HOUSING INSECURITY: UNSAFE APPLIANCES: 0

## 2018-06-14 SDOH — ECONOMIC STABILITY: HOUSING INSECURITY: UNSAFE COOKING RANGE AREA: 0

## 2018-06-14 ASSESSMENT — ACTIVITIES OF DAILY LIVING (ADL)
ADLS_COMMENTS: <!--EPICS-->NA<!--EPICE-->
IADLS_COMMENTS: <!--EPICS-->NA<!--EPICE-->

## 2018-06-16 ENCOUNTER — HOME CARE VISIT (OUTPATIENT)
Dept: HOME HEALTH SERVICES | Facility: HOME HEALTHCARE | Age: 83
End: 2018-06-16
Payer: MEDICARE

## 2018-06-16 PROCEDURE — G0495 RN CARE TRAIN/EDU IN HH: HCPCS

## 2018-06-17 VITALS
SYSTOLIC BLOOD PRESSURE: 118 MMHG | HEART RATE: 77 BPM | TEMPERATURE: 97 F | RESPIRATION RATE: 18 BRPM | OXYGEN SATURATION: 98 % | DIASTOLIC BLOOD PRESSURE: 66 MMHG

## 2018-06-17 SDOH — ECONOMIC STABILITY: HOUSING INSECURITY: UNSAFE COOKING RANGE AREA: 0

## 2018-06-17 SDOH — ECONOMIC STABILITY: HOUSING INSECURITY: UNSAFE APPLIANCES: 0

## 2018-06-17 ASSESSMENT — ENCOUNTER SYMPTOMS
RESPIRATORY SYMPTOMS COMMENTS: PT ON CONTINUOUS OXYGEN
SHORTNESS OF BREATH: T

## 2018-06-18 ENCOUNTER — HOME CARE VISIT (OUTPATIENT)
Dept: HOME HEALTH SERVICES | Facility: HOME HEALTHCARE | Age: 83
End: 2018-06-18
Payer: MEDICARE

## 2018-06-18 PROCEDURE — G0152 HHCP-SERV OF OT,EA 15 MIN: HCPCS

## 2018-06-18 PROCEDURE — G0495 RN CARE TRAIN/EDU IN HH: HCPCS

## 2018-06-19 VITALS
OXYGEN SATURATION: 99 % | SYSTOLIC BLOOD PRESSURE: 135 MMHG | TEMPERATURE: 99.3 F | HEART RATE: 85 BPM | RESPIRATION RATE: 18 BRPM | DIASTOLIC BLOOD PRESSURE: 82 MMHG

## 2018-06-19 VITALS
SYSTOLIC BLOOD PRESSURE: 135 MMHG | RESPIRATION RATE: 18 BRPM | HEART RATE: 85 BPM | DIASTOLIC BLOOD PRESSURE: 82 MMHG | TEMPERATURE: 99.3 F | OXYGEN SATURATION: 85 %

## 2018-06-19 SDOH — ECONOMIC STABILITY: HOUSING INSECURITY: HOME SAFETY: PT LIVES W SPOUSE IN NEATLY KEPT SINGLE STORY HOME; SPOUSE HAS MILD DEMENTIA; PT IS NEW TO 24/7 O2

## 2018-06-19 ASSESSMENT — ACTIVITIES OF DAILY LIVING (ADL)
ADLS_COMMENTS: CE-->
TRANSPORTATION_ASSISTANCE: 6
MEAL_PREP_ASSISTANCE: 5
BATHING_ASSISTIVE_EQUIPMENT_NEEDED: SHOWER CHAIR
BATHING_ASSISTANCE: 2
DRESSING_LB_ASSISTANCE: 0
TELEPHONE_ASSISTANCE: 0
HOUSEKEEPING_ASSISTANCE: 6
SHOPPING_ASSISTANCE: 6
TOILETING_ASSISTANCE: 0
LAUNDRY_ASSISTANCE: 6
GROOMING_ASSISTANCE: 0
EATING_ASSISTANCE: 0
DRESSING_UB_ASSISTANCE: 0
ORAL_CARE_ASSISTANCE: 0

## 2018-06-19 ASSESSMENT — ENCOUNTER SYMPTOMS: POOR JUDGMENT: 1

## 2018-06-20 ENCOUNTER — HOME CARE VISIT (OUTPATIENT)
Dept: HOME HEALTH SERVICES | Facility: HOME HEALTHCARE | Age: 83
End: 2018-06-20
Payer: MEDICARE

## 2018-06-20 VITALS
TEMPERATURE: 99.3 F | DIASTOLIC BLOOD PRESSURE: 60 MMHG | OXYGEN SATURATION: 98 % | SYSTOLIC BLOOD PRESSURE: 96 MMHG | HEART RATE: 88 BPM | WEIGHT: 114 LBS | BODY MASS INDEX: 18.97 KG/M2 | RESPIRATION RATE: 18 BRPM

## 2018-06-20 VITALS
HEART RATE: 86 BPM | SYSTOLIC BLOOD PRESSURE: 110 MMHG | OXYGEN SATURATION: 97 % | RESPIRATION RATE: 18 BRPM | DIASTOLIC BLOOD PRESSURE: 62 MMHG | TEMPERATURE: 99.1 F

## 2018-06-20 PROCEDURE — G0151 HHCP-SERV OF PT,EA 15 MIN: HCPCS

## 2018-06-20 PROCEDURE — G0152 HHCP-SERV OF OT,EA 15 MIN: HCPCS

## 2018-06-20 PROCEDURE — G0494 LPN CARE EA 15MIN HH/HOSPICE: HCPCS

## 2018-06-20 ASSESSMENT — ENCOUNTER SYMPTOMS: POOR JUDGMENT: 1

## 2018-06-21 ENCOUNTER — OFFICE VISIT (OUTPATIENT)
Dept: CARDIOLOGY | Facility: MEDICAL CENTER | Age: 83
End: 2018-06-21
Payer: MEDICARE

## 2018-06-21 ENCOUNTER — TELEPHONE (OUTPATIENT)
Dept: CARDIOLOGY | Facility: MEDICAL CENTER | Age: 83
End: 2018-06-21

## 2018-06-21 VITALS
SYSTOLIC BLOOD PRESSURE: 110 MMHG | TEMPERATURE: 99.1 F | RESPIRATION RATE: 18 BRPM | DIASTOLIC BLOOD PRESSURE: 60 MMHG | HEART RATE: 82 BPM | OXYGEN SATURATION: 97 %

## 2018-06-21 VITALS
DIASTOLIC BLOOD PRESSURE: 78 MMHG | WEIGHT: 117 LBS | HEIGHT: 65 IN | OXYGEN SATURATION: 100 % | SYSTOLIC BLOOD PRESSURE: 118 MMHG | BODY MASS INDEX: 19.49 KG/M2 | HEART RATE: 97 BPM

## 2018-06-21 DIAGNOSIS — R94.31 NONSPECIFIC ABNORMAL ELECTROCARDIOGRAM (ECG) (EKG): ICD-10-CM

## 2018-06-21 DIAGNOSIS — J43.2 CENTRILOBULAR EMPHYSEMA (HCC): ICD-10-CM

## 2018-06-21 DIAGNOSIS — E78.00 PURE HYPERCHOLESTEROLEMIA: ICD-10-CM

## 2018-06-21 DIAGNOSIS — I10 ESSENTIAL HYPERTENSION: ICD-10-CM

## 2018-06-21 DIAGNOSIS — I27.21 PAH (PULMONARY ARTERY HYPERTENSION) (HCC): ICD-10-CM

## 2018-06-21 PROBLEM — J44.1 COPD WITH ACUTE EXACERBATION (HCC): Status: RESOLVED | Noted: 2018-06-03 | Resolved: 2018-06-21

## 2018-06-21 PROBLEM — J96.21 ACUTE ON CHRONIC RESPIRATORY FAILURE WITH HYPOXIA (HCC): Status: RESOLVED | Noted: 2018-06-03 | Resolved: 2018-06-21

## 2018-06-21 PROCEDURE — 99214 OFFICE O/P EST MOD 30 MIN: CPT | Performed by: NURSE PRACTITIONER

## 2018-06-21 ASSESSMENT — ENCOUNTER SYMPTOMS
SEVERE DYSPNEA: 1
DIZZINESS: 0
SHORTNESS OF BREATH: 1
PALPITATIONS: 0
CLAUDICATION: 0
FEVER: 0
ABDOMINAL PAIN: 0
ORTHOPNEA: 0
PND: 0
MYALGIAS: 0
COUGH: 0

## 2018-06-21 NOTE — LETTER
[facility]   [address], [address]  [place] [address]  Phone: [phone]  Fax: [phone]              Garrett Alvarez  8/8/1934    Encounter Date: 6/21/2018    SHANNA العلي          PROGRESS NOTE:  Subjective:   Garrett Alvarez is a 81 y.o. male who presents today for hospital follow up S/P COPD exacerbation causing elevated troponin from demand ischemia.    He is a patient of Dr. Gala Bear in our office. Hx of HTN, HLD, and moderate PAH from COPD.    He is overall doing good. He is wearing his oxygen and has great vitals today.    His daughter works in our office.    He has no cardiac symptoms that he complains of.    He has no angina.    Past Medical History:   Diagnosis Date   • Centrilobular emphysema (HCC) 12/3/2015   • COPD (chronic obstructive pulmonary disease) (HCC)    • Dyslipidemia    • Hypertension    • Pulmonary emphysema (HCC)    • Pulmonary hypertension (HCC)      Past Surgical History:   Procedure Laterality Date   • HERNIA REPAIR  1990    right inguinal hernia      Family History   Problem Relation Age of Onset   • Heart Disease Father    • Heart Attack Brother    • Other Brother      CAD   • Cancer Brother      lung, other     History   Smoking Status   • Former Smoker   • Quit date: 1/1/2008   Smokeless Tobacco   • Never Used     No Known Allergies  Outpatient Encounter Prescriptions as of 6/21/2018   Medication Sig Dispense Refill   • Mometasone Furo-Formoterol Fum (DULERA) 100-5 MCG/ACT Aerosol Inhale 1 Puff by mouth every day at 6 PM.     • non-formulary med Inhale 2.5 L/min by mouth Continuous.     • Tiotropium Bromide-Olodaterol 2.5-2.5 MCG/ACT Aero Soln Inhale 1 Spray by mouth every day.     • clopidogrel (PLAVIX) 75 MG Tab Take 1 Tab by mouth every day. 30 Tab 0   • aspirin EC 81 MG EC tablet Take 1 Tab by mouth every day. 30 Tab 0   • albuterol 108 (90 Base) MCG/ACT Aero Soln inhalation aerosol  "Inhale 2 Puffs by mouth every 6 hours as needed for Shortness of Breath.     • lisinopril (PRINIVIL) 20 MG Tab Take 1 Tab by mouth 2 times a day. 180 Tab 3   • fluticasone-salmeterol (ADVAIR) 500-50 MCG/DOSE AEROSOL POWDER, BREATH ACTIVATED Inhale 1 Puff by mouth every 12 hours. Rinse mouth after use 1 Inhaler 5   • tamsulosin (FLOMAX) 0.4 MG capsule Take 1 Cap by mouth every bedtime. 90 Cap 3   • atorvastatin (LIPITOR) 10 MG TABS Take 10 mg by mouth every evening.       • [DISCONTINUED] predniSONE (DELTASONE) 10 MG Tab 10 mg po bid x 3 days, then 10 mg po daily x 3 days then 5 mg po daily x 3 days then stop (Patient not taking: Reported on 6/18/2018) 11 Tab 0   • [DISCONTINUED] azithromycin (ZITHROMAX) 250 MG Tab Po daily (Patient not taking: Reported on 6/18/2018) 3 Tab 0   • [DISCONTINUED] umeclidinium-vilanterol (ANORO ELLIPTA) 62.5-25 MCG/INH AEROSOL POWDER, BREATH ACTIVATED inhaler Inhale 1 Puff by mouth every day. (Patient not taking: Reported on 6/18/2018) 1 Each 0     No facility-administered encounter medications on file as of 6/21/2018.      Review of Systems   Constitutional: Positive for malaise/fatigue. Negative for fever.   Respiratory: Positive for shortness of breath. Negative for cough.    Cardiovascular: Negative for chest pain, palpitations, orthopnea, claudication, leg swelling and PND.   Gastrointestinal: Negative for abdominal pain.   Musculoskeletal: Negative for myalgias.   Neurological: Negative for dizziness.   All other systems reviewed and are negative.       Objective:   /78   Pulse 97   Ht 1.651 m (5' 5\")   Wt 53.1 kg (117 lb)   SpO2 100%   BMI 19.47 kg/m²      Physical Exam   Constitutional: He is oriented to person, place, and time. He appears well-developed and well-nourished. No distress.   HENT:   Head: Normocephalic and atraumatic.   Eyes: EOM are normal.   Neck: Normal range of motion. No JVD present.   Cardiovascular: Normal rate, regular rhythm, normal heart sounds " and intact distal pulses.    No murmur heard.  Pulmonary/Chest: Accessory muscle usage present. Tachypnea noted. No respiratory distress. He has decreased breath sounds in the right upper field, the right lower field, the left upper field and the left lower field. He has no wheezes. He has no rales.   Pursed lip breathing   Abdominal: Soft. Bowel sounds are normal.   Musculoskeletal: Normal range of motion. He exhibits no edema.   Neurological: He is alert and oriented to person, place, and time.   Skin: Skin is warm and dry.   Psychiatric: He has a normal mood and affect.   Nursing note and vitals reviewed.    Reviewed with patient  Lab Results   Component Value Date/Time    CHOLSTRLTOT 203 (H) 06/03/2018 02:18 AM     (H) 06/03/2018 02:18 AM    HDL 92 06/03/2018 02:18 AM    TRIGLYCERIDE 54 06/03/2018 02:18 AM       Lab Results   Component Value Date/Time    SODIUM 132 (L) 06/06/2018 09:32 AM    POTASSIUM 4.7 06/06/2018 09:32 AM    CHLORIDE 90 (L) 06/06/2018 09:32 AM    CO2 40 (H) 06/06/2018 09:32 AM    GLUCOSE 102 (H) 06/06/2018 09:32 AM    BUN 17 06/06/2018 09:32 AM    CREATININE 0.62 06/06/2018 09:32 AM    CREATININE 1.1 12/26/2008 07:59 AM     Lab Results   Component Value Date/Time    WBC 7.8 06/06/2018 04:28 AM    RBC 3.65 (L) 06/06/2018 04:28 AM    HEMOGLOBIN 11.4 (L) 06/06/2018 04:28 AM    HEMATOCRIT 36.8 (L) 06/06/2018 04:28 AM    .8 (H) 06/06/2018 04:28 AM    MCH 31.2 06/06/2018 04:28 AM    MCHC 31.0 (L) 06/06/2018 04:28 AM    MPV 10.1 06/06/2018 04:28 AM    NEUTSPOLYS 75.10 (H) 06/06/2018 04:28 AM    LYMPHOCYTES 12.40 (L) 06/06/2018 04:28 AM    MONOCYTES 12.40 06/06/2018 04:28 AM    EOSINOPHILS 0.00 06/06/2018 04:28 AM    BASOPHILS 0.00 06/06/2018 04:28 AM    ANISOCYTOSIS 2+ 06/03/2018 02:18 AM      11/17/2014 ECHO CONCLUSIONS  Normal left ventricular systolic function.  Grade I diastolic dysfunction - mitral inflow E/A is <1.0.  Normal left ventricular chamber size.  Mildly dilated right  atrium.  Mitral annular calcification.  Mild mitral regurgitation.  Aortic sclerosis without stenosis.  Moderate tricuspid regurgitation.  Right ventricular systolic pressure is estimated to be 45 mmHg.  Compared to prior echo dated 06/27/2014 there has been no significant   Change.    MPI 2014   Normal findings with no ischemic changes and normal EF    Assessment:     1. Pure hypercholesterolemia  LIPID PANEL    COMP METABOLIC PANEL   2. Centrilobular emphysema (HCC)     3. Essential hypertension  NM-CARDIAC STRESS TEST   4. Nonspecific abnormal electrocardiogram (ECG) (EKG)  NM-CARDIAC STRESS TEST   5. PAH (pulmonary artery hypertension) (HCC)         Medical Decision Making:  Today's Assessment / Status / Plan:     1. HLD  -statin  -recheck lipid and CMP in 6 weeks  -LDL goal <70 with presumed CAD?    2. Dyspnea from COPD and moderate PAH  -stable  -oxygen and inhalers per pulm    3. HTN  -good control on lisinopril 20 mg BID  -cont    4.  NSTEMI with demand ischemia with COPD exacerbation  -trop elevation with COPD exacerbation  -recommend stress imaging to rule out ischemia  -if +, consider LHC with possible PCI  -if -, drop plavix, cont asa 81 mg and statin  -follow symptoms clinically    FU in clinic in 6 months with LA; call with echo and lab results    Patient verbalizes understanding and agrees with the plan of care.     Collaborating MD: Elmer WARD    Physical Exam   Constitutional: He is oriented to person, place, and time. He appears well-developed and well-nourished. No distress.   HENT:   Head: Normocephalic and atraumatic.   Eyes: EOM are normal.   Neck: Normal range of motion. No JVD present.   Cardiovascular: Normal rate, regular rhythm, normal heart sounds and intact distal pulses.    No murmur heard.  Pulmonary/Chest: Accessory muscle usage present. Tachypnea noted. No respiratory distress. He has decreased breath sounds in the right upper field, the right lower field, the left upper field and the  left lower field. He has no wheezes. He has no rales.   Pursed lip breathing   Abdominal: Soft. Bowel sounds are normal.   Musculoskeletal: Normal range of motion. He exhibits no edema.   Neurological: He is alert and oriented to person, place, and time.   Skin: Skin is warm and dry.   Psychiatric: He has a normal mood and affect.   Nursing note and vitals reviewed.          No Recipients

## 2018-06-21 NOTE — PROGRESS NOTES
Subjective:   Garrett Alvarez is a 81 y.o. male who presents today for hospital follow up S/P COPD exacerbation causing elevated troponin from demand ischemia.    He is a patient of Dr. Gala Bear in our office. Hx of HTN, HLD, and moderate PAH from COPD.    He is overall doing good. He is wearing his oxygen and has great vitals today.    His daughter works in our office.    He has no cardiac symptoms that he complains of.    He has no angina.    Past Medical History:   Diagnosis Date   • Centrilobular emphysema (HCC) 12/3/2015   • COPD (chronic obstructive pulmonary disease) (HCC)    • Dyslipidemia    • Hypertension    • Pulmonary emphysema (HCC)    • Pulmonary hypertension (HCC)      Past Surgical History:   Procedure Laterality Date   • HERNIA REPAIR  1990    right inguinal hernia      Family History   Problem Relation Age of Onset   • Heart Disease Father    • Heart Attack Brother    • Other Brother      CAD   • Cancer Brother      lung, other     History   Smoking Status   • Former Smoker   • Quit date: 1/1/2008   Smokeless Tobacco   • Never Used     No Known Allergies  Outpatient Encounter Prescriptions as of 6/21/2018   Medication Sig Dispense Refill   • Mometasone Furo-Formoterol Fum (DULERA) 100-5 MCG/ACT Aerosol Inhale 1 Puff by mouth every day at 6 PM.     • non-formulary med Inhale 2.5 L/min by mouth Continuous.     • Tiotropium Bromide-Olodaterol 2.5-2.5 MCG/ACT Aero Soln Inhale 1 Spray by mouth every day.     • clopidogrel (PLAVIX) 75 MG Tab Take 1 Tab by mouth every day. 30 Tab 0   • aspirin EC 81 MG EC tablet Take 1 Tab by mouth every day. 30 Tab 0   • albuterol 108 (90 Base) MCG/ACT Aero Soln inhalation aerosol Inhale 2 Puffs by mouth every 6 hours as needed for Shortness of Breath.     • lisinopril (PRINIVIL) 20 MG Tab Take 1 Tab by mouth 2 times a day. 180 Tab 3   • fluticasone-salmeterol (ADVAIR) 500-50 MCG/DOSE AEROSOL POWDER, BREATH ACTIVATED Inhale 1 Puff by mouth every 12 hours.  "Rinse mouth after use 1 Inhaler 5   • tamsulosin (FLOMAX) 0.4 MG capsule Take 1 Cap by mouth every bedtime. 90 Cap 3   • atorvastatin (LIPITOR) 10 MG TABS Take 10 mg by mouth every evening.       • [DISCONTINUED] predniSONE (DELTASONE) 10 MG Tab 10 mg po bid x 3 days, then 10 mg po daily x 3 days then 5 mg po daily x 3 days then stop (Patient not taking: Reported on 6/18/2018) 11 Tab 0   • [DISCONTINUED] azithromycin (ZITHROMAX) 250 MG Tab Po daily (Patient not taking: Reported on 6/18/2018) 3 Tab 0   • [DISCONTINUED] umeclidinium-vilanterol (ANORO ELLIPTA) 62.5-25 MCG/INH AEROSOL POWDER, BREATH ACTIVATED inhaler Inhale 1 Puff by mouth every day. (Patient not taking: Reported on 6/18/2018) 1 Each 0     No facility-administered encounter medications on file as of 6/21/2018.      Review of Systems   Constitutional: Positive for malaise/fatigue. Negative for fever.   Respiratory: Positive for shortness of breath. Negative for cough.    Cardiovascular: Negative for chest pain, palpitations, orthopnea, claudication, leg swelling and PND.   Gastrointestinal: Negative for abdominal pain.   Musculoskeletal: Negative for myalgias.   Neurological: Negative for dizziness.   All other systems reviewed and are negative.       Objective:   /78   Pulse 97   Ht 1.651 m (5' 5\")   Wt 53.1 kg (117 lb)   SpO2 100%   BMI 19.47 kg/m²     Physical Exam   Constitutional: He is oriented to person, place, and time. He appears well-developed and well-nourished. No distress.   HENT:   Head: Normocephalic and atraumatic.   Eyes: EOM are normal.   Neck: Normal range of motion. No JVD present.   Cardiovascular: Normal rate, regular rhythm, normal heart sounds and intact distal pulses.    No murmur heard.  Pulmonary/Chest: Accessory muscle usage present. Tachypnea noted. No respiratory distress. He has decreased breath sounds in the right upper field, the right lower field, the left upper field and the left lower field. He has no " wheezes. He has no rales.   Pursed lip breathing   Abdominal: Soft. Bowel sounds are normal.   Musculoskeletal: Normal range of motion. He exhibits no edema.   Neurological: He is alert and oriented to person, place, and time.   Skin: Skin is warm and dry.   Psychiatric: He has a normal mood and affect.   Nursing note and vitals reviewed.    Reviewed with patient  Lab Results   Component Value Date/Time    CHOLSTRLTOT 203 (H) 06/03/2018 02:18 AM     (H) 06/03/2018 02:18 AM    HDL 92 06/03/2018 02:18 AM    TRIGLYCERIDE 54 06/03/2018 02:18 AM       Lab Results   Component Value Date/Time    SODIUM 132 (L) 06/06/2018 09:32 AM    POTASSIUM 4.7 06/06/2018 09:32 AM    CHLORIDE 90 (L) 06/06/2018 09:32 AM    CO2 40 (H) 06/06/2018 09:32 AM    GLUCOSE 102 (H) 06/06/2018 09:32 AM    BUN 17 06/06/2018 09:32 AM    CREATININE 0.62 06/06/2018 09:32 AM    CREATININE 1.1 12/26/2008 07:59 AM     Lab Results   Component Value Date/Time    WBC 7.8 06/06/2018 04:28 AM    RBC 3.65 (L) 06/06/2018 04:28 AM    HEMOGLOBIN 11.4 (L) 06/06/2018 04:28 AM    HEMATOCRIT 36.8 (L) 06/06/2018 04:28 AM    .8 (H) 06/06/2018 04:28 AM    MCH 31.2 06/06/2018 04:28 AM    MCHC 31.0 (L) 06/06/2018 04:28 AM    MPV 10.1 06/06/2018 04:28 AM    NEUTSPOLYS 75.10 (H) 06/06/2018 04:28 AM    LYMPHOCYTES 12.40 (L) 06/06/2018 04:28 AM    MONOCYTES 12.40 06/06/2018 04:28 AM    EOSINOPHILS 0.00 06/06/2018 04:28 AM    BASOPHILS 0.00 06/06/2018 04:28 AM    ANISOCYTOSIS 2+ 06/03/2018 02:18 AM      11/17/2014 ECHO CONCLUSIONS  Normal left ventricular systolic function.  Grade I diastolic dysfunction - mitral inflow E/A is <1.0.  Normal left ventricular chamber size.  Mildly dilated right atrium.  Mitral annular calcification.  Mild mitral regurgitation.  Aortic sclerosis without stenosis.  Moderate tricuspid regurgitation.  Right ventricular systolic pressure is estimated to be 45 mmHg.  Compared to prior echo dated 06/27/2014 there has been no significant    Change.    MPI 2014   Normal findings with no ischemic changes and normal EF    Assessment:     1. Pure hypercholesterolemia  LIPID PANEL    COMP METABOLIC PANEL   2. Centrilobular emphysema (HCC)     3. Essential hypertension  NM-CARDIAC STRESS TEST   4. Nonspecific abnormal electrocardiogram (ECG) (EKG)  NM-CARDIAC STRESS TEST   5. PAH (pulmonary artery hypertension) (HCC)         Medical Decision Making:  Today's Assessment / Status / Plan:     1. HLD  -statin  -recheck lipid and CMP in 6 weeks  -LDL goal <70 with presumed CAD?    2. Dyspnea from COPD and moderate PAH  -stable  -oxygen and inhalers per pulm    3. HTN  -good control on lisinopril 20 mg BID  -cont    4.  NSTEMI with demand ischemia with COPD exacerbation  -trop elevation with COPD exacerbation  -recommend stress imaging to rule out ischemia  -if +, consider LHC with possible PCI  -if -, drop plavix, cont asa 81 mg and statin  -follow symptoms clinically    FU in clinic in 6 months with LA; call with echo and lab results    Patient verbalizes understanding and agrees with the plan of care.     Collaborating MD: Elmer WARD    Physical Exam   Constitutional: He is oriented to person, place, and time. He appears well-developed and well-nourished. No distress.   HENT:   Head: Normocephalic and atraumatic.   Eyes: EOM are normal.   Neck: Normal range of motion. No JVD present.   Cardiovascular: Normal rate, regular rhythm, normal heart sounds and intact distal pulses.    No murmur heard.  Pulmonary/Chest: Accessory muscle usage present. Tachypnea noted. No respiratory distress. He has decreased breath sounds in the right upper field, the right lower field, the left upper field and the left lower field. He has no wheezes. He has no rales.   Pursed lip breathing   Abdominal: Soft. Bowel sounds are normal.   Musculoskeletal: Normal range of motion. He exhibits no edema.   Neurological: He is alert and oriented to person, place, and time.   Skin: Skin is  warm and dry.   Psychiatric: He has a normal mood and affect.   Nursing note and vitals reviewed.

## 2018-06-22 ENCOUNTER — HOME CARE VISIT (OUTPATIENT)
Dept: HOME HEALTH SERVICES | Facility: HOME HEALTHCARE | Age: 83
End: 2018-06-22
Payer: MEDICARE

## 2018-06-22 PROCEDURE — G0495 RN CARE TRAIN/EDU IN HH: HCPCS

## 2018-06-22 PROCEDURE — G0151 HHCP-SERV OF PT,EA 15 MIN: HCPCS

## 2018-06-23 ENCOUNTER — HOSPITAL ENCOUNTER (OUTPATIENT)
Dept: LAB | Facility: MEDICAL CENTER | Age: 83
End: 2018-06-23
Attending: FAMILY MEDICINE
Payer: MEDICARE

## 2018-06-23 ENCOUNTER — HOME CARE VISIT (OUTPATIENT)
Dept: HOME HEALTH SERVICES | Facility: HOME HEALTHCARE | Age: 83
End: 2018-06-23
Payer: MEDICARE

## 2018-06-23 VITALS
OXYGEN SATURATION: 97 % | SYSTOLIC BLOOD PRESSURE: 98 MMHG | RESPIRATION RATE: 16 BRPM | TEMPERATURE: 97.8 F | DIASTOLIC BLOOD PRESSURE: 58 MMHG | HEART RATE: 82 BPM

## 2018-06-23 VITALS
SYSTOLIC BLOOD PRESSURE: 120 MMHG | HEART RATE: 84 BPM | DIASTOLIC BLOOD PRESSURE: 75 MMHG | RESPIRATION RATE: 18 BRPM | OXYGEN SATURATION: 96 % | TEMPERATURE: 97.7 F

## 2018-06-23 LAB
ALBUMIN SERPL BCP-MCNC: 3.6 G/DL (ref 3.2–4.9)
ALBUMIN/GLOB SERPL: 1.3 G/DL
ALP SERPL-CCNC: 34 U/L (ref 30–99)
ALT SERPL-CCNC: 13 U/L (ref 2–50)
ANION GAP SERPL CALC-SCNC: 3 MMOL/L (ref 0–11.9)
AST SERPL-CCNC: 17 U/L (ref 12–45)
BASOPHILS # BLD AUTO: 0.7 % (ref 0–1.8)
BASOPHILS # BLD: 0.04 K/UL (ref 0–0.12)
BILIRUB SERPL-MCNC: 0.6 MG/DL (ref 0.1–1.5)
BUN SERPL-MCNC: 14 MG/DL (ref 8–22)
CALCIUM SERPL-MCNC: 8.8 MG/DL (ref 8.5–10.5)
CHLORIDE SERPL-SCNC: 102 MMOL/L (ref 96–112)
CHOLEST SERPL-MCNC: 177 MG/DL (ref 100–199)
CO2 SERPL-SCNC: 32 MMOL/L (ref 20–33)
CREAT SERPL-MCNC: 0.57 MG/DL (ref 0.5–1.4)
EOSINOPHIL # BLD AUTO: 0.33 K/UL (ref 0–0.51)
EOSINOPHIL NFR BLD: 5.5 % (ref 0–6.9)
ERYTHROCYTE [DISTWIDTH] IN BLOOD BY AUTOMATED COUNT: 50.1 FL (ref 35.9–50)
GLOBULIN SER CALC-MCNC: 2.7 G/DL (ref 1.9–3.5)
GLUCOSE SERPL-MCNC: 99 MG/DL (ref 65–99)
HCT VFR BLD AUTO: 37.2 % (ref 42–52)
HDLC SERPL-MCNC: 74 MG/DL
HGB BLD-MCNC: 11.5 G/DL (ref 14–18)
IMM GRANULOCYTES # BLD AUTO: 0.01 K/UL (ref 0–0.11)
IMM GRANULOCYTES NFR BLD AUTO: 0.2 % (ref 0–0.9)
LDLC SERPL CALC-MCNC: 91 MG/DL
LYMPHOCYTES # BLD AUTO: 1.55 K/UL (ref 1–4.8)
LYMPHOCYTES NFR BLD: 25.7 % (ref 22–41)
MCH RBC QN AUTO: 31.3 PG (ref 27–33)
MCHC RBC AUTO-ENTMCNC: 30.9 G/DL (ref 33.7–35.3)
MCV RBC AUTO: 101.4 FL (ref 81.4–97.8)
MONOCYTES # BLD AUTO: 0.65 K/UL (ref 0–0.85)
MONOCYTES NFR BLD AUTO: 10.8 % (ref 0–13.4)
NEUTROPHILS # BLD AUTO: 3.44 K/UL (ref 1.82–7.42)
NEUTROPHILS NFR BLD: 57.1 % (ref 44–72)
NRBC # BLD AUTO: 0 K/UL
NRBC BLD-RTO: 0 /100 WBC
PLATELET # BLD AUTO: 205 K/UL (ref 164–446)
PMV BLD AUTO: 10.1 FL (ref 9–12.9)
POTASSIUM SERPL-SCNC: 4.5 MMOL/L (ref 3.6–5.5)
PROT SERPL-MCNC: 6.3 G/DL (ref 6–8.2)
RBC # BLD AUTO: 3.67 M/UL (ref 4.7–6.1)
SODIUM SERPL-SCNC: 137 MMOL/L (ref 135–145)
TRIGL SERPL-MCNC: 58 MG/DL (ref 0–149)
TSH SERPL DL<=0.005 MIU/L-ACNC: 2.22 UIU/ML (ref 0.38–5.33)
WBC # BLD AUTO: 6 K/UL (ref 4.8–10.8)

## 2018-06-23 PROCEDURE — 85025 COMPLETE CBC W/AUTO DIFF WBC: CPT

## 2018-06-23 PROCEDURE — 84443 ASSAY THYROID STIM HORMONE: CPT

## 2018-06-23 PROCEDURE — 80053 COMPREHEN METABOLIC PANEL: CPT

## 2018-06-23 PROCEDURE — 36415 COLL VENOUS BLD VENIPUNCTURE: CPT

## 2018-06-23 PROCEDURE — 80061 LIPID PANEL: CPT

## 2018-06-23 SDOH — ECONOMIC STABILITY: HOUSING INSECURITY: HOME SAFETY: IALS PRESENT IN THE HOME.

## 2018-06-23 SDOH — ECONOMIC STABILITY: HOUSING INSECURITY
HOME SAFETY: IN THE HOME. SMOKE ALARMS ARE PRESENT AND FUNCTIONAL ON EACH LEVEL OF THE HOME. PATIENT DOES HAVE A FIRE ESCAPE PLAN DEVELOPED. PATIENT DOES NOT HAVE FLAMMABLE MATERIALS PRESENT IN THE HOME PRESENTING A FIRE HAZARD. NO EVIDENCE FOUND OF SMOKING MATER

## 2018-06-23 SDOH — ECONOMIC STABILITY: HOUSING INSECURITY
HOME SAFETY: OXYGEN SAFETY RISK ASSESSMENT PERFORMED. PATIENT DOES NOT HAVE A NO SMOKING SIGN POSTED IN THE HOME., PT WAS GIVEN A NO SMOKING SIGN AND PROVIDED EDUCATION ABOUT WHY IT IS IMPORTANT TO PLACE ONE. PATIENT DOES HAVE A WORKING FIRE EXTINGUISHER PRESENT

## 2018-06-23 SDOH — ECONOMIC STABILITY: HOUSING INSECURITY: UNSAFE COOKING RANGE AREA: 0

## 2018-06-23 SDOH — ECONOMIC STABILITY: HOUSING INSECURITY: UNSAFE APPLIANCES: 0

## 2018-06-23 ASSESSMENT — ENCOUNTER SYMPTOMS
NAUSEA: PT DENIES
SEVERE DYSPNEA: 1
VOMITING: PT DENIES

## 2018-06-25 ENCOUNTER — HOME CARE VISIT (OUTPATIENT)
Dept: HOME HEALTH SERVICES | Facility: HOME HEALTHCARE | Age: 83
End: 2018-06-25
Payer: MEDICARE

## 2018-06-25 VITALS
RESPIRATION RATE: 16 BRPM | HEART RATE: 80 BPM | OXYGEN SATURATION: 99 % | DIASTOLIC BLOOD PRESSURE: 70 MMHG | SYSTOLIC BLOOD PRESSURE: 118 MMHG | TEMPERATURE: 99.5 F

## 2018-06-25 PROCEDURE — G0152 HHCP-SERV OF OT,EA 15 MIN: HCPCS

## 2018-06-25 PROCEDURE — G0495 RN CARE TRAIN/EDU IN HH: HCPCS

## 2018-06-27 ENCOUNTER — HOME CARE VISIT (OUTPATIENT)
Dept: HOME HEALTH SERVICES | Facility: HOME HEALTHCARE | Age: 83
End: 2018-06-27
Payer: MEDICARE

## 2018-06-27 VITALS
HEART RATE: 71 BPM | TEMPERATURE: 98.5 F | DIASTOLIC BLOOD PRESSURE: 70 MMHG | OXYGEN SATURATION: 98 % | SYSTOLIC BLOOD PRESSURE: 124 MMHG | RESPIRATION RATE: 16 BRPM

## 2018-06-27 PROCEDURE — G0152 HHCP-SERV OF OT,EA 15 MIN: HCPCS

## 2018-06-27 PROCEDURE — G0151 HHCP-SERV OF PT,EA 15 MIN: HCPCS

## 2018-06-27 ASSESSMENT — ACTIVITIES OF DAILY LIVING (ADL)
TOILETING_ASSISTANCE: 0
BATHING_ASSISTIVE_EQUIPMENT_USED: SHOWER CHAIR, GRAB BARS, HHSH
DRESSING_LB_ASSISTANCE: 0
DRESSING_UB_ASSISTANCE: 0
HOUSEKEEPING_ASSISTANCE: 6
GROOMING_ASSISTANCE: 0
SHOPPING_ASSISTANCE: 4
ORAL_CARE_ASSISTANCE: 0
LAUNDRY_ASSISTANCE: 6
BATHING_ASSISTANCE: 0
TRANSPORTATION_ASSISTANCE: 6
TELEPHONE_ASSISTANCE: 0
MEAL_PREP_ASSISTANCE: 6
EATING_ASSISTANCE: 0

## 2018-06-28 ENCOUNTER — PATIENT OUTREACH (OUTPATIENT)
Dept: HEALTH INFORMATION MANAGEMENT | Facility: OTHER | Age: 83
End: 2018-06-28

## 2018-06-28 ENCOUNTER — HOME CARE VISIT (OUTPATIENT)
Dept: HOME HEALTH SERVICES | Facility: HOME HEALTHCARE | Age: 83
End: 2018-06-28
Payer: MEDICARE

## 2018-06-28 VITALS
RESPIRATION RATE: 18 BRPM | TEMPERATURE: 98.1 F | OXYGEN SATURATION: 96 % | SYSTOLIC BLOOD PRESSURE: 120 MMHG | HEART RATE: 69 BPM | DIASTOLIC BLOOD PRESSURE: 65 MMHG

## 2018-06-28 VITALS
HEART RATE: 83 BPM | DIASTOLIC BLOOD PRESSURE: 58 MMHG | OXYGEN SATURATION: 99 % | TEMPERATURE: 98.4 F | SYSTOLIC BLOOD PRESSURE: 110 MMHG | RESPIRATION RATE: 16 BRPM

## 2018-06-28 DIAGNOSIS — J44.9 CHRONIC OBSTRUCTIVE PULMONARY DISEASE, UNSPECIFIED COPD TYPE (HCC): ICD-10-CM

## 2018-06-28 PROCEDURE — G0495 RN CARE TRAIN/EDU IN HH: HCPCS

## 2018-06-28 SDOH — ECONOMIC STABILITY: HOUSING INSECURITY: UNSAFE APPLIANCES: 0

## 2018-06-28 SDOH — ECONOMIC STABILITY: HOUSING INSECURITY: UNSAFE COOKING RANGE AREA: 0

## 2018-06-28 ASSESSMENT — ENCOUNTER SYMPTOMS: SEVERE DYSPNEA: 1

## 2018-06-29 ENCOUNTER — HOME CARE VISIT (OUTPATIENT)
Dept: HOME HEALTH SERVICES | Facility: HOME HEALTHCARE | Age: 83
End: 2018-06-29
Payer: MEDICARE

## 2018-06-29 PROCEDURE — G0151 HHCP-SERV OF PT,EA 15 MIN: HCPCS

## 2018-06-29 SDOH — ECONOMIC STABILITY: HOUSING INSECURITY: UNSAFE COOKING RANGE AREA: 0

## 2018-06-29 SDOH — ECONOMIC STABILITY: HOUSING INSECURITY: UNSAFE APPLIANCES: 0

## 2018-06-29 ASSESSMENT — ENCOUNTER SYMPTOMS: SEVERE DYSPNEA: 1

## 2018-06-29 ASSESSMENT — ACTIVITIES OF DAILY LIVING (ADL)
HOME_HEALTH_OASIS: 01
OASIS_M1830: 01
HOME_HEALTH_OASIS: 00

## 2018-07-01 VITALS
HEART RATE: 78 BPM | OXYGEN SATURATION: 98 % | RESPIRATION RATE: 16 BRPM | TEMPERATURE: 98.4 F | DIASTOLIC BLOOD PRESSURE: 68 MMHG | SYSTOLIC BLOOD PRESSURE: 120 MMHG

## 2018-07-01 ASSESSMENT — ENCOUNTER SYMPTOMS
SHORTNESS OF BREATH: T
RESPIRATORY SYMPTOMS COMMENTS: PT ON CONTINUOUS OXYGEN

## 2018-07-02 VITALS
TEMPERATURE: 97.2 F | DIASTOLIC BLOOD PRESSURE: 65 MMHG | SYSTOLIC BLOOD PRESSURE: 120 MMHG | RESPIRATION RATE: 20 BRPM | OXYGEN SATURATION: 96 % | HEART RATE: 78 BPM

## 2018-07-03 ENCOUNTER — HOME CARE VISIT (OUTPATIENT)
Dept: HOME HEALTH SERVICES | Facility: HOME HEALTHCARE | Age: 83
End: 2018-07-03
Payer: MEDICARE

## 2018-07-06 ENCOUNTER — PATIENT OUTREACH (OUTPATIENT)
Dept: HEALTH INFORMATION MANAGEMENT | Facility: OTHER | Age: 83
End: 2018-07-06

## 2018-07-13 NOTE — PROGRESS NOTES
Garrett Alvarez was admitted to [facility] on 6/2/18 for COPD with exacerbation, and was discharged on 6/6/18. Per discharge orders, patient had a follow-up appointment with Dr. Wilkins (PCP) on 6/8/18 and with Mireya ALCOCER (cardiology) on 6/21/18. Patient was also discharged with physical therapy and skilled nursing. [facility] patient advocate was able to successfully engage with the patient’s authorized caregiver post-discharge and many times throughout the life cycle. [facility] patient advocate assisted the patient by scheduling a follow-up appointment with Carmen ALCOCER (urology) on 6/18/18. [facility] also reached out to the PCP’s office to coordinate an order for an OCD. Patient has a future appointment with Carmen ALCCOER on 7/18/18, with Doreen ALCOCER (pulmonology) on 9/13/18 and with Dr. Bear (cardiology) on 12/13/18. PPS screening was conducted at 70%.

## 2018-08-13 ENCOUNTER — HOSPITAL ENCOUNTER (OUTPATIENT)
Dept: LAB | Facility: MEDICAL CENTER | Age: 83
End: 2018-08-13
Attending: UROLOGY
Payer: MEDICARE

## 2018-08-13 PROCEDURE — 87086 URINE CULTURE/COLONY COUNT: CPT

## 2018-08-15 LAB
BACTERIA UR CULT: NORMAL
SIGNIFICANT IND 70042: NORMAL
SITE SITE: NORMAL
SOURCE SOURCE: NORMAL

## 2018-08-24 NOTE — TELEPHONE ENCOUNTER
Pharmacy is requesting a new rx for Dulera 100 MCG    Last OV: 4/18/18- Doreen Pérez    Next OV: 9/13/18    DX: Centrilobular emphysema     Medications: Dulera 100 MCG    Message was forwarded to the pool for Doreen Pérez isn't in today.

## 2018-08-28 ENCOUNTER — PATIENT OUTREACH (OUTPATIENT)
Dept: HEALTH INFORMATION MANAGEMENT | Facility: OTHER | Age: 83
End: 2018-08-28

## 2018-08-28 ENCOUNTER — TELEPHONE (OUTPATIENT)
Dept: HEALTH INFORMATION MANAGEMENT | Facility: OTHER | Age: 83
End: 2018-08-28

## 2018-08-28 DIAGNOSIS — Z79.899 ENCOUNTER FOR MEDICATION REVIEW: ICD-10-CM

## 2018-08-28 DIAGNOSIS — J44.9 CHRONIC OBSTRUCTIVE PULMONARY DISEASE, UNSPECIFIED COPD TYPE (HCC): ICD-10-CM

## 2018-08-28 NOTE — PROGRESS NOTES
Received referral from  ABRAM Art for medication review. Outbound call to Garrett. Reviewed indication, dose, and timing of each medication with patient. Patient reports using Anoro Ellipta, Spiriva Respimat, and Dulera inhalers. Confirmed with Stacie at [facility] pharmacy that patient has Spiriva Respimat 2.5 mcg in addition to Anoro and Dulera inhalers. 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 Patient also expresses concern over costs of his inhalers. Patient is amenable to switching to Trelegy if pulmonologist is in agreement.     Plan:   Sent message to Doreen ALCOCER regarding inhalers.   Will follow-up with patient once clarification of inhalers is received.     Haley Ch, PharmD

## 2018-08-28 NOTE — TELEPHONE ENCOUNTER
He was last seen in the office 4/18/2018 and at that time was on Advair, Spiriva and Albuterol. Phone message from 5/24 indicates that pt wanted an RX for Anoro. He was told he cannot take Advair and Spiriva with the Anoro and he stated he understood this.   I'm not sure there the confusion came from. I would definitely consider a switch to Trelegy for pt convenience and to prevent duplication of therapy. Let me know if you need me to send a script in. Thanks, Doreen

## 2018-08-28 NOTE — TELEPHONE ENCOUNTER
Dear Doreen,     I had the pleasure to review medications with your patient Garrett. Patient reports using Anoro, Dulera, and Spiriva respimat inhalers. Concern for therapeutic duplication of components of Anoro and Spiriva and Anoro and Dulera. Patient expresses concerns over costs of his inhalers. Would you consider Trelegy Ellipta which contains ICS, LAMA, and LABA in a once daily formulation for patient? This may help reduce costs.     Thank you for your consideration,   Haley Ch, PharmD Ext [phone]

## 2018-08-28 NOTE — PROGRESS NOTES
"Referred by ABRAM Jules with [facility]. \"COPD, needs education\".  Outreach call to the patient for CC services.  The patient acknowledges that he has access to care and needed services and is not interested in RN CC services at this time. The patient is interested in Pharmacy referral for medication review.  Garrett has CC contact info and will reach out with any questions or concerns as needed.  Plan: will place a referral to Pharmacy.  "

## 2018-08-29 ENCOUNTER — PATIENT OUTREACH (OUTPATIENT)
Dept: HEALTH INFORMATION MANAGEMENT | Facility: OTHER | Age: 83
End: 2018-08-29

## 2018-08-29 NOTE — PROGRESS NOTES
Received notification from Doreen ALCOCER that prescription for Trelegy was sent to patient's pharmacy. Outbound call to [facility]. Spoke with Viv. Confirmed that RX was received by pharmacy but they do not have the product in stock. They will have to order the inhaler for tomorrow and process RX. Viv states they expect to have the medication around 10 am. Per [facility] formulary, Trelegy is Tier 3 and should be $47 co-pay for patient. Requested Viv to reverse Dulera claim as patient will not be taking this medication. Updated medication list in EPIC. Placed call to Garrett. Left  informing patient that new inhaler was prescribed and should be ready at his pharmacy tomorrow. Included my contact information for additional questions/ concerns.     Haley Ch, PharmD

## 2018-08-29 NOTE — TELEPHONE ENCOUNTER
Thank you Doreen. Yesy  for patient notifying him that prescription was sent to his pharmacy. [facility] expects to have medication in stock on 08/30.

## 2018-08-29 NOTE — TELEPHONE ENCOUNTER
Patient agreeable to switch to Trelegy. Could you please send RX to [facility] pharmacy.     Thank you,   Haley Ch, PharmD

## 2018-09-05 ENCOUNTER — PATIENT OUTREACH (OUTPATIENT)
Dept: HEALTH INFORMATION MANAGEMENT | Facility: OTHER | Age: 83
End: 2018-09-05

## 2018-09-05 NOTE — PROGRESS NOTES
Follow-up call placed to [facility]'s pharmacy. Per Stacie, Trelegy inhaler was picked up for $47 co-pay on 08/29/18. Outbound call to patient. Left VM with my contact information should patient have questions/ concerns regarding the medication.     1400- Received return call from Garrett. Patient reports that Trelegy is working well. He reports rinsing mouth after use and taking once daily. Denies questions/ concerns at this time.     Haley Ch, PharmD

## 2018-09-13 ENCOUNTER — OFFICE VISIT (OUTPATIENT)
Dept: PULMONOLOGY | Facility: HOSPICE | Age: 83
End: 2018-09-13
Payer: MEDICARE

## 2018-09-13 ENCOUNTER — NON-PROVIDER VISIT (OUTPATIENT)
Dept: PULMONOLOGY | Facility: HOSPICE | Age: 83
End: 2018-09-13
Payer: MEDICARE

## 2018-09-13 ENCOUNTER — TELEPHONE (OUTPATIENT)
Dept: PULMONOLOGY | Facility: HOSPICE | Age: 83
End: 2018-09-13

## 2018-09-13 VITALS
DIASTOLIC BLOOD PRESSURE: 80 MMHG | BODY MASS INDEX: 20.83 KG/M2 | SYSTOLIC BLOOD PRESSURE: 110 MMHG | HEIGHT: 65 IN | WEIGHT: 125 LBS | TEMPERATURE: 98.1 F | OXYGEN SATURATION: 96 % | HEART RATE: 99 BPM | RESPIRATION RATE: 16 BRPM

## 2018-09-13 DIAGNOSIS — J43.2 CENTRILOBULAR EMPHYSEMA (HCC): ICD-10-CM

## 2018-09-13 DIAGNOSIS — Z23 NEED FOR INFLUENZA VACCINATION: ICD-10-CM

## 2018-09-13 DIAGNOSIS — J96.11 CHRONIC RESPIRATORY FAILURE WITH HYPOXIA (HCC): ICD-10-CM

## 2018-09-13 PROCEDURE — 99214 OFFICE O/P EST MOD 30 MIN: CPT | Mod: 25 | Performed by: NURSE PRACTITIONER

## 2018-09-13 PROCEDURE — G0008 ADMIN INFLUENZA VIRUS VAC: HCPCS | Performed by: NURSE PRACTITIONER

## 2018-09-13 PROCEDURE — 94726 PLETHYSMOGRAPHY LUNG VOLUMES: CPT | Performed by: INTERNAL MEDICINE

## 2018-09-13 PROCEDURE — 94060 EVALUATION OF WHEEZING: CPT | Performed by: INTERNAL MEDICINE

## 2018-09-13 PROCEDURE — 94729 DIFFUSING CAPACITY: CPT | Performed by: INTERNAL MEDICINE

## 2018-09-13 PROCEDURE — 90662 IIV NO PRSV INCREASED AG IM: CPT | Performed by: NURSE PRACTITIONER

## 2018-09-13 ASSESSMENT — PULMONARY FUNCTION TESTS
FEV1/FVC: 31
FEV1_LLN: 1.79
FEV1/FVC_PERCENT_PREDICTED: 44
FEV1/FVC_PREDICTED: 71
FEV1_PERCENT_PREDICTED: 40
FEV1_PERCENT_PREDICTED: 44
FEV1/FVC_PERCENT_PREDICTED: 51
FEV1/FVC: 36
FEV1/FVC_PERCENT_CHANGE: 33
FEV1/FVC: 30.77
FVC_PERCENT_PREDICTED: 78
FVC_PREDICTED: 3.12
FVC_PERCENT_PREDICTED: 100
FEV1: .96
FEV1_PERCENT_CHANGE: 27
FEV1/FVC_PERCENT_LLN: 59
FEV1_PREDICTED: 2.14
FEV1: .88
FVC: 2.45
FEV1/FVC_PERCENT_PREDICTED: 50
FEV1/FVC: 36
FEV1/FVC_PERCENT_PREDICTED: 69
FVC_LLN: 2.61
FEV1_PERCENT_CHANGE: 9
FEV1/FVC_PERCENT_PREDICTED: 43
FEV1/FVC_PERCENT_CHANGE: -14
FVC: 3.12

## 2018-09-13 NOTE — TELEPHONE ENCOUNTER
Called cornelia regarding OPO ordered 4/18/18. [facility] has attempted to call pt multiple times and pt has never returned there calls. Order was cx'd

## 2018-09-13 NOTE — PROGRESS NOTES
Chief Complaint   Patient presents with   • Follow-Up     [facility] DC 6/5/18 , CTA 6/3/18   • Results     PFT       HPI:  Garrett Alvarez is a 84 y.o. year old male here today for follow-up on his chronic obstructive pulmonary disease. He has been followed by Shirley ALCOCER in the past. Pulmonary function tests from 2013 indicated an FEV1 of 0.76 L, 31% predicted with positive bronchodilator response, FEV1/FVC ratio of 35% and a DLCO of 57% predicted. He was recently switched from Advair and Spiriva to Trelegy. He feels this is working well for him. He has a Proair HFA inhaler. He has been on 02 at 4 LPM nocturnally and with exertion. He was decreased to 2.5 LPM 02 at his last office visit as his saturations were 99%.   He was hospitalized in June 2018 for COPD exacerbation and NSTEMI. He was treated with IV antibiotics and steroids. Cardiology was consulted and he was started on ASA and Plavix. He has seen Cardiology outpatient and he was taking off the Plavix. Stress test was ordered, but has not been completed.    PFT's were completed today in the office and indicates an FEV1 of 0.88 L, 40% predicted with an FEV1/FVC ratio of 36, DLCO of 41% predicted.     He notes chronic dyspnea worse with exertion. He feels this is stable. He denies current cough or mucous. He notes rare wheezing. He denies fevers or chills.     CTA 6/3/2018;  No evidence of pulmonary embolus.  Severe emphysema.  Atherosclerotic disease with coronary artery calcifications.  Bilateral hydronephrosis.    Echo 6/2/2018;  CONCLUSIONS  Normal left ventricular systolic function.  Left ventricular ejection fraction is visually estimated to be 55%.  Abnormal septal motion.  The right ventricle was normal in size and function.  Mild mitral regurgitation.  Moderate tricuspid regurgitation.  Estimated right ventricular systolic pressure is 45 mmHg.  Compared to the images of the study done 5/7/2015 - there has been no   significant  change.    Past Medical History:   Diagnosis Date   • Centrilobular emphysema (HCC) 12/3/2015   • COPD (chronic obstructive pulmonary disease) (HCC)    • Dyslipidemia    • Hypertension    • Pulmonary emphysema (HCC)    • Pulmonary hypertension (HCC)        Past Surgical History:   Procedure Laterality Date   • HERNIA REPAIR  1990    right inguinal hernia        Family History   Problem Relation Age of Onset   • Heart Disease Father    • Heart Attack Brother    • Other Brother         CAD   • Cancer Brother         lung, other       Social History     Social History   • Marital status:      Spouse name: N/A   • Number of children: N/A   • Years of education: N/A     Occupational History   • Not on file.     Social History Main Topics   • Smoking status: Former Smoker     Quit date: 1/1/2008   • Smokeless tobacco: Never Used   • Alcohol use 1.0 oz/week     2 Cans of beer per week      Comment: week   • Drug use: No   • Sexual activity: Yes     Partners: Female     Other Topics Concern   • Not on file     Social History Narrative   • No narrative on file         ROS:  Constitutional: Denies fevers, chills, sweats, fatigue, weight loss  Eyes: Denies glasses, vision loss, pain, drainage, double vision  Ears/Nose/Mouth/Throat: Denies rhinitis, nasal congestion, ear ache, difficulty hearing, sore throat, persistent hoarseness, decayed teeth/toothache  Cardiovascular: Denies chest pain, tightness, palpitations, swelling in feet/legs, fainting, difficulty breathing when laying down  Respiratory: See HPI   GI: Denies heartburn, difficulty swallowing, nausea, vomiting, abdominal pain, diarrhea, constipation  : Denies frequent urination, painful urination  Integumentary: Denies rashes, lumps or color changes  MSK: Denies painful joints, sore muscles, and back pain.   Neurological: Denies frequent headaches, dizziness, weakness        Current Outpatient Prescriptions   Medication Sig Dispense Refill   •  "Fluticasone-Umeclidin-Vilant (TRELEGY ELLIPTA) 100-62.5-25 MCG/INH AEROSOL POWDER, BREATH ACTIVATED Inhale 1 Puff by mouth every day. Rinse mouth after use 1 Each 11   • non-formulary med Inhale 2.5 L/min by mouth Continuous.     • clopidogrel (PLAVIX) 75 MG Tab Take 1 Tab by mouth every day. 30 Tab 0   • aspirin EC 81 MG EC tablet Take 1 Tab by mouth every day. 30 Tab 0   • albuterol 108 (90 Base) MCG/ACT Aero Soln inhalation aerosol Inhale 2 Puffs by mouth every 6 hours as needed for Shortness of Breath.     • lisinopril (PRINIVIL) 20 MG Tab Take 1 Tab by mouth 2 times a day. 180 Tab 3   • tamsulosin (FLOMAX) 0.4 MG capsule Take 1 Cap by mouth every bedtime. 90 Cap 3   • atorvastatin (LIPITOR) 10 MG TABS Take 10 mg by mouth every evening.         No current facility-administered medications for this visit.        No Known Allergies    Blood pressure 110/80, pulse 99, temperature 36.7 °C (98.1 °F), resp. rate 16, height 1.651 m (5' 5\"), weight 56.7 kg (125 lb), SpO2 96 %.    PE:   Appearance: Thin male, no acute distress  Eyes: PERRL, EOM intact, sclera white, conjunctiva moist  Ears: no lesions or deformities  Hearing: grossly intact  Nose: no lesions or deformities  Oropharynx: tongue normal, posterior pharynx without erythema or exudate  Neck: supple, trachea midline, no masses   Respiratory effort: no intercostal retractions or use of accessory muscles  Lung auscultation: no rales, rhonchi or wheezes, diminished   Heart auscultation: no murmur rub or gallop  Extremities: no cyanosis or edema  Abdomen: soft ,non tender, no masses  Gait and Station: normal  Digits and nails: no clubbing, cyanosis, petechiae or nodes.  Cranial nerves: grossly intact  Skin: no rashes, lesions or ulcers noted  Orientation: Oriented to time, person and place  Mood and affect: mood and affect appropriate, normal interaction with examiner  Judgement: Intact          Assessment:    1. Centrilobular emphysema (HCC)  DME OTHER    DME CNOX " BY DME CO   2. Chronic respiratory failure with hypoxia (HCC)  DME OTHER    DME CNOX BY DME CO   3. Need for influenza vaccination  INFLUENZA VACCINE, HIGH DOSE (65+ ONLY)         Plan:    1) Encouraged to continue to follow with Cardiology. He is pending a stress test and follow up in December.   2) Reviewed PFT's in detail. Continue Trelegy and Proair inhalers. Discussed referral to Pulmonary Rehab. He would like to hold off on referral at this time.   3) Patient is up to date on his Prevnar 13 and Pneumovax 23 vaccinations. Updated Influenza vaccine today in the office.  4) Continue 02 2.5 LPM 24/7. Pending CNOX from DME to ensure adequate 02 saturations. He is having issues ambulating with large tanks. Order to DME for RT eval for POC.   5) Follow up in 6 months, sooner if needed. Discussed importance of prompt treatment of acute infectious symptoms.

## 2018-09-13 NOTE — PROCEDURES
Good patient effort & cooperation.  The results of this test meet the ATS/ERS standards for acceptability and repeatability.  Predicted equations for Spirometry are N-Bryce II, ITS for lung volumes, and [facility] for DLCO.  The DLCO was uncorrected for Hgb.  A bronchodilator of Ventolin HFA- 2puffs via spacer were administered.  DLCO was performed during dilation period.    1. Baseline spirometry demonstrates a severe reduction in FEV1. FEV1/FVC ratio is severely reduced.    2. After administration of an inhaled bronchodilator there is 9% improvement in FEV1.    3. Lung volumes demonstrate hyperinflation.    4. Gas exchange as estimated by DLCO is severely reduced.    5. Airway resistance is increased.    Bladder worked as  Th only snores.  He    This study demonstrates the presence of severe obstructive pulmonary disease.  Partial reversibility is noted on the study.  Reduction in DLCO is compatible with a clinical diagnosis of emphysema.

## 2018-09-13 NOTE — PATIENT INSTRUCTIONS
Plan:    1) Encouraged to continue to follow with Cardiology. He is pending a stress test and follow up in December.   2) Reviewed PFT's in detail. Continue Trelegy and Proair inhalers. Discussed referral to Pulmonary Rehab. He would like to hold off on referral at this time.   3) Patient is up to date on his Prevnar 13 and Pneumovax 23 vaccinations. Updated Influenza vaccine today in the office.  4) Continue 02 2.5 LPM 24/7. Pending CNOX from DME to ensure adequate 02 saturations. He is having issues ambulating with large tanks. Order to DME for RT eval for POC.   5) Follow up in 6 months, sooner if needed. Discussed importance of prompt treatment of acute infectious symptoms.

## 2018-09-27 ENCOUNTER — TELEPHONE (OUTPATIENT)
Dept: PULMONOLOGY | Facility: HOSPICE | Age: 83
End: 2018-09-27

## 2018-09-28 NOTE — TELEPHONE ENCOUNTER
Pt notified on vm of your last message: his overnight oximetry indicates adequate 02 saturation on 2 LPM of supplemental oxygen with a mean 02 of 94.9%. No need to change 02 setting. TY!

## 2018-09-28 NOTE — TELEPHONE ENCOUNTER
Please let pt know his overnight oximetry indicates adequate 02 saturation on 2 LPM of supplemental oxygen with a mean 02 of 94.9%. No need to change 02 setting.

## 2018-12-13 ENCOUNTER — OFFICE VISIT (OUTPATIENT)
Dept: CARDIOLOGY | Facility: MEDICAL CENTER | Age: 83
End: 2018-12-13
Payer: MEDICARE

## 2018-12-13 VITALS
SYSTOLIC BLOOD PRESSURE: 140 MMHG | WEIGHT: 133 LBS | HEART RATE: 74 BPM | BODY MASS INDEX: 22.16 KG/M2 | HEIGHT: 65 IN | OXYGEN SATURATION: 99 % | DIASTOLIC BLOOD PRESSURE: 80 MMHG

## 2018-12-13 DIAGNOSIS — I27.21 PAH (PULMONARY ARTERY HYPERTENSION) (HCC): ICD-10-CM

## 2018-12-13 DIAGNOSIS — I15.0 RENOVASCULAR HYPERTENSION: ICD-10-CM

## 2018-12-13 DIAGNOSIS — E78.2 MIXED HYPERLIPIDEMIA: ICD-10-CM

## 2018-12-13 PROBLEM — R94.31 NONSPECIFIC ABNORMAL ELECTROCARDIOGRAM (ECG) (EKG): Status: RESOLVED | Noted: 2018-06-21 | Resolved: 2018-12-13

## 2018-12-13 PROCEDURE — 99214 OFFICE O/P EST MOD 30 MIN: CPT | Performed by: INTERNAL MEDICINE

## 2018-12-13 ASSESSMENT — ENCOUNTER SYMPTOMS
SPUTUM PRODUCTION: 0
NAUSEA: 0
ABDOMINAL PAIN: 0
DEPRESSION: 0
FALLS: 0
DOUBLE VISION: 0
COUGH: 0
WEIGHT LOSS: 0
HEARTBURN: 0
DIZZINESS: 0
NERVOUS/ANXIOUS: 0
WHEEZING: 0
MYALGIAS: 0
CHILLS: 0
PALPITATIONS: 0
FEVER: 0
INSOMNIA: 0
BLURRED VISION: 0
MEMORY LOSS: 0
LOSS OF CONSCIOUSNESS: 0
BRUISES/BLEEDS EASILY: 0
CLAUDICATION: 0

## 2018-12-13 NOTE — PROGRESS NOTES
Chief Complaint   Patient presents with   • HTN (Controlled)     follow up       Subjective:   Garrett Alvarze is a 84 y.o. male who presents today in follow-up in regards to his end-stage lung disease/emphysema and resultant presumptive cor pulmonale/pulmonary hypertension in the setting of hypertension and hyperlipidemia    Very optimistic  Minimally symptomatic  Some lung medication is extraordinarily expensive but managing    Family is well had a new great grandson in [place] 1-year-old    Past Medical History:   Diagnosis Date   • Centrilobular emphysema (HCC) 12/3/2015   • COPD (chronic obstructive pulmonary disease) (HCC)    • Dyslipidemia    • Hypertension    • Pulmonary emphysema (HCC)    • Pulmonary hypertension (HCC)      Past Surgical History:   Procedure Laterality Date   • HERNIA REPAIR  1990    right inguinal hernia      Family History   Problem Relation Age of Onset   • Heart Disease Father    • Heart Attack Brother    • Other Brother         CAD   • Cancer Brother         lung, other     Social History     Social History   • Marital status:      Spouse name: N/A   • Number of children: N/A   • Years of education: N/A     Occupational History   • Not on file.     Social History Main Topics   • Smoking status: Former Smoker     Quit date: 1/1/2008   • Smokeless tobacco: Never Used   • Alcohol use 1.0 oz/week     2 Cans of beer per week      Comment: week   • Drug use: No   • Sexual activity: Yes     Partners: Female     Other Topics Concern   • Not on file     Social History Narrative   • No narrative on file     No Known Allergies  Outpatient Encounter Prescriptions as of 12/13/2018   Medication Sig Dispense Refill   • Fluticasone-Umeclidin-Vilant (TRELEGY ELLIPTA) 100-62.5-25 MCG/INH AEROSOL POWDER, BREATH ACTIVATED Inhale 1 Puff by mouth every day. Rinse mouth after use 1 Each 11   • clopidogrel (PLAVIX) 75 MG Tab Take 1 Tab by mouth every day. 30 Tab 0   • aspirin EC 81 MG EC  "tablet Take 1 Tab by mouth every day. 30 Tab 0   • albuterol 108 (90 Base) MCG/ACT Aero Soln inhalation aerosol Inhale 2 Puffs by mouth every 6 hours as needed for Shortness of Breath.     • lisinopril (PRINIVIL) 20 MG Tab Take 1 Tab by mouth 2 times a day. 180 Tab 3   • tamsulosin (FLOMAX) 0.4 MG capsule Take 1 Cap by mouth every bedtime. 90 Cap 3   • atorvastatin (LIPITOR) 10 MG TABS Take 10 mg by mouth every evening.       • non-formulary med Inhale 2.5 L/min by mouth Continuous.       No facility-administered encounter medications on file as of 12/13/2018.      Review of Systems   Constitutional: Negative for chills, fever, malaise/fatigue and weight loss.   HENT: Negative for hearing loss and tinnitus.    Eyes: Negative for blurred vision and double vision.   Respiratory: Negative for cough, sputum production and wheezing.    Cardiovascular: Negative for chest pain, palpitations, claudication and leg swelling.   Gastrointestinal: Negative for abdominal pain, heartburn and nausea.   Musculoskeletal: Negative for falls and myalgias.   Skin: Negative for rash.   Neurological: Negative for dizziness and loss of consciousness.   Endo/Heme/Allergies: Negative for environmental allergies. Does not bruise/bleed easily.   Psychiatric/Behavioral: Negative for depression and memory loss. The patient is not nervous/anxious and does not have insomnia.    All other systems reviewed and are negative.       Objective:   /80 (BP Location: Left arm, Patient Position: Sitting)   Pulse 74   Ht 1.651 m (5' 5\")   Wt 60.3 kg (133 lb)   SpO2 99%   BMI 22.13 kg/m²     Physical Exam   Constitutional: He is oriented to person, place, and time.   Barrel chested and thin wearing oxygen   HENT:   Head: Normocephalic and atraumatic.   Eyes: Pupils are equal, round, and reactive to light. EOM are normal. No scleral icterus.   Neck: No JVD present. No thyromegaly present.   Cardiovascular: Normal rate, regular rhythm and intact " distal pulses.  Exam reveals no gallop.    No murmur heard.  Pulmonary/Chest: Effort normal. No respiratory distress. He has no wheezes. He has no rales.   Abdominal: Bowel sounds are normal. He exhibits no distension.   Musculoskeletal: He exhibits no edema or tenderness.   Lymphadenopathy:     He has no cervical adenopathy.   Neurological: He is alert and oriented to person, place, and time. Coordination normal.   Skin: Skin is warm and dry. No rash noted.   Psychiatric: He has a normal mood and affect. His behavior is normal.       Assessment:     1. PAH (pulmonary artery hypertension) (HCC)     2. Mixed hyperlipidemia     3. Renovascular hypertension         Medical Decision Making:  Today's Assessment / Status / Plan:     Cor pulmonale/pulmonary hypertension  Guarded prognosis, resulting from long-standing emphysema  Stable today  End-of-life care plan discussed, follows closely with pulmonary  Echo was repeated by me last summer, we looked at images, ejection fraction 55% pulmonary pressures about 50 no significant valve disease    Hypertension hyperlipidemia  Discussed possibility of developing significant coronary disease  Reassurance  Renewed medication  Annual lab work reviewed    RTC one year sooner with concerns

## 2018-12-17 RX ORDER — ATORVASTATIN CALCIUM 10 MG/1
10 TABLET, FILM COATED ORAL DAILY
Qty: 90 TAB | Refills: 3 | Status: SHIPPED | OUTPATIENT
Start: 2018-12-17 | End: 2019-08-25 | Stop reason: SDUPTHER

## 2019-01-11 DIAGNOSIS — I21.4 NSTEMI (NON-ST ELEVATED MYOCARDIAL INFARCTION) (HCC): Primary | ICD-10-CM

## 2019-01-14 ENCOUNTER — HOSPITAL ENCOUNTER (OUTPATIENT)
Dept: LAB | Facility: MEDICAL CENTER | Age: 84
End: 2019-01-14
Attending: FAMILY MEDICINE
Payer: MEDICARE

## 2019-01-14 LAB
ALBUMIN SERPL BCP-MCNC: 4.2 G/DL (ref 3.2–4.9)
ALBUMIN/GLOB SERPL: 1.4 G/DL
ALP SERPL-CCNC: 41 U/L (ref 30–99)
ALT SERPL-CCNC: 9 U/L (ref 2–50)
ANION GAP SERPL CALC-SCNC: 4 MMOL/L (ref 0–11.9)
AST SERPL-CCNC: 15 U/L (ref 12–45)
BASOPHILS # BLD AUTO: 0.5 % (ref 0–1.8)
BASOPHILS # BLD: 0.03 K/UL (ref 0–0.12)
BILIRUB SERPL-MCNC: 0.8 MG/DL (ref 0.1–1.5)
BUN SERPL-MCNC: 11 MG/DL (ref 8–22)
CALCIUM SERPL-MCNC: 9.3 MG/DL (ref 8.5–10.5)
CHLORIDE SERPL-SCNC: 97 MMOL/L (ref 96–112)
CHOLEST SERPL-MCNC: 176 MG/DL (ref 100–199)
CO2 SERPL-SCNC: 31 MMOL/L (ref 20–33)
CREAT SERPL-MCNC: 0.71 MG/DL (ref 0.5–1.4)
EOSINOPHIL # BLD AUTO: 0.35 K/UL (ref 0–0.51)
EOSINOPHIL NFR BLD: 6.2 % (ref 0–6.9)
ERYTHROCYTE [DISTWIDTH] IN BLOOD BY AUTOMATED COUNT: 49.4 FL (ref 35.9–50)
FASTING STATUS PATIENT QL REPORTED: NORMAL
GLOBULIN SER CALC-MCNC: 2.9 G/DL (ref 1.9–3.5)
GLUCOSE SERPL-MCNC: 106 MG/DL (ref 65–99)
HCT VFR BLD AUTO: 39.5 % (ref 42–52)
HDLC SERPL-MCNC: 78 MG/DL
HGB BLD-MCNC: 13 G/DL (ref 14–18)
IMM GRANULOCYTES # BLD AUTO: 0.01 K/UL (ref 0–0.11)
IMM GRANULOCYTES NFR BLD AUTO: 0.2 % (ref 0–0.9)
LDLC SERPL CALC-MCNC: 87 MG/DL
LYMPHOCYTES # BLD AUTO: 1.49 K/UL (ref 1–4.8)
LYMPHOCYTES NFR BLD: 26.2 % (ref 22–41)
MCH RBC QN AUTO: 31.9 PG (ref 27–33)
MCHC RBC AUTO-ENTMCNC: 32.9 G/DL (ref 33.7–35.3)
MCV RBC AUTO: 96.8 FL (ref 81.4–97.8)
MONOCYTES # BLD AUTO: 0.61 K/UL (ref 0–0.85)
MONOCYTES NFR BLD AUTO: 10.7 % (ref 0–13.4)
NEUTROPHILS # BLD AUTO: 3.19 K/UL (ref 1.82–7.42)
NEUTROPHILS NFR BLD: 56.2 % (ref 44–72)
NRBC # BLD AUTO: 0 K/UL
NRBC BLD-RTO: 0 /100 WBC
PLATELET # BLD AUTO: 215 K/UL (ref 164–446)
PMV BLD AUTO: 10.1 FL (ref 9–12.9)
POTASSIUM SERPL-SCNC: 4.1 MMOL/L (ref 3.6–5.5)
PROT SERPL-MCNC: 7.1 G/DL (ref 6–8.2)
RBC # BLD AUTO: 4.08 M/UL (ref 4.7–6.1)
SODIUM SERPL-SCNC: 132 MMOL/L (ref 135–145)
TRIGL SERPL-MCNC: 57 MG/DL (ref 0–149)
TSH SERPL DL<=0.005 MIU/L-ACNC: 2.26 UIU/ML (ref 0.38–5.33)
WBC # BLD AUTO: 5.7 K/UL (ref 4.8–10.8)

## 2019-01-14 PROCEDURE — 85025 COMPLETE CBC W/AUTO DIFF WBC: CPT

## 2019-01-14 PROCEDURE — 80061 LIPID PANEL: CPT

## 2019-01-14 PROCEDURE — 36415 COLL VENOUS BLD VENIPUNCTURE: CPT

## 2019-01-14 PROCEDURE — 84443 ASSAY THYROID STIM HORMONE: CPT

## 2019-01-14 PROCEDURE — 80053 COMPREHEN METABOLIC PANEL: CPT

## 2019-01-16 RX ORDER — CLOPIDOGREL BISULFATE 75 MG/1
75 TABLET ORAL DAILY
Qty: 90 TAB | Refills: 3 | Status: SHIPPED | OUTPATIENT
Start: 2019-01-16 | End: 2020-01-06

## 2019-02-17 ENCOUNTER — HOSPITAL ENCOUNTER (EMERGENCY)
Facility: MEDICAL CENTER | Age: 84
End: 2019-02-17
Attending: EMERGENCY MEDICINE
Payer: MEDICARE

## 2019-02-17 VITALS
BODY MASS INDEX: 20.42 KG/M2 | HEIGHT: 67 IN | OXYGEN SATURATION: 100 % | WEIGHT: 130.07 LBS | HEART RATE: 73 BPM | RESPIRATION RATE: 18 BRPM | SYSTOLIC BLOOD PRESSURE: 132 MMHG | TEMPERATURE: 97 F | DIASTOLIC BLOOD PRESSURE: 73 MMHG

## 2019-02-17 DIAGNOSIS — N39.0 URINARY TRACT INFECTION ASSOCIATED WITH INDWELLING URETHRAL CATHETER, INITIAL ENCOUNTER (HCC): ICD-10-CM

## 2019-02-17 DIAGNOSIS — R31.0 GROSS HEMATURIA: ICD-10-CM

## 2019-02-17 DIAGNOSIS — T83.511A URINARY TRACT INFECTION ASSOCIATED WITH INDWELLING URETHRAL CATHETER, INITIAL ENCOUNTER (HCC): ICD-10-CM

## 2019-02-17 LAB
ALBUMIN SERPL BCP-MCNC: 3.8 G/DL (ref 3.2–4.9)
ALBUMIN/GLOB SERPL: 1.5 G/DL
ALP SERPL-CCNC: 40 U/L (ref 30–99)
ALT SERPL-CCNC: 12 U/L (ref 2–50)
ANION GAP SERPL CALC-SCNC: 6 MMOL/L (ref 0–11.9)
APPEARANCE UR: ABNORMAL
APTT PPP: 30 SEC (ref 24.7–36)
AST SERPL-CCNC: 16 U/L (ref 12–45)
BACTERIA #/AREA URNS HPF: ABNORMAL /HPF
BASOPHILS # BLD AUTO: 0.5 % (ref 0–1.8)
BASOPHILS # BLD: 0.04 K/UL (ref 0–0.12)
BILIRUB SERPL-MCNC: 0.4 MG/DL (ref 0.1–1.5)
BILIRUB UR QL STRIP.AUTO: ABNORMAL
BUN SERPL-MCNC: 13 MG/DL (ref 8–22)
CALCIUM SERPL-MCNC: 9 MG/DL (ref 8.5–10.5)
CHLORIDE SERPL-SCNC: 98 MMOL/L (ref 96–112)
CO2 SERPL-SCNC: 30 MMOL/L (ref 20–33)
COLOR UR: ABNORMAL
CREAT SERPL-MCNC: 0.64 MG/DL (ref 0.5–1.4)
EOSINOPHIL # BLD AUTO: 0.11 K/UL (ref 0–0.51)
EOSINOPHIL NFR BLD: 1.4 % (ref 0–6.9)
EPI CELLS #/AREA URNS HPF: ABNORMAL /HPF
ERYTHROCYTE [DISTWIDTH] IN BLOOD BY AUTOMATED COUNT: 47 FL (ref 35.9–50)
GLOBULIN SER CALC-MCNC: 2.6 G/DL (ref 1.9–3.5)
GLUCOSE SERPL-MCNC: 105 MG/DL (ref 65–99)
GLUCOSE UR STRIP.AUTO-MCNC: NEGATIVE MG/DL
HCT VFR BLD AUTO: 41.1 % (ref 42–52)
HGB BLD-MCNC: 12.9 G/DL (ref 14–18)
IMM GRANULOCYTES # BLD AUTO: 0.03 K/UL (ref 0–0.11)
IMM GRANULOCYTES NFR BLD AUTO: 0.4 % (ref 0–0.9)
INR PPP: 1.1 (ref 0.87–1.13)
KETONES UR STRIP.AUTO-MCNC: NEGATIVE MG/DL
LEUKOCYTE ESTERASE UR QL STRIP.AUTO: ABNORMAL
LYMPHOCYTES # BLD AUTO: 1.14 K/UL (ref 1–4.8)
LYMPHOCYTES NFR BLD: 14.9 % (ref 22–41)
MCH RBC QN AUTO: 30.3 PG (ref 27–33)
MCHC RBC AUTO-ENTMCNC: 31.4 G/DL (ref 33.7–35.3)
MCV RBC AUTO: 96.5 FL (ref 81.4–97.8)
MICRO URNS: ABNORMAL
MONOCYTES # BLD AUTO: 0.74 K/UL (ref 0–0.85)
MONOCYTES NFR BLD AUTO: 9.7 % (ref 0–13.4)
MUCOUS THREADS #/AREA URNS HPF: ABNORMAL /HPF
NEUTROPHILS # BLD AUTO: 5.59 K/UL (ref 1.82–7.42)
NEUTROPHILS NFR BLD: 73.1 % (ref 44–72)
NITRITE UR QL STRIP.AUTO: POSITIVE
NRBC # BLD AUTO: 0 K/UL
NRBC BLD-RTO: 0 /100 WBC
PH UR STRIP.AUTO: 7 [PH]
PLATELET # BLD AUTO: 270 K/UL (ref 164–446)
PMV BLD AUTO: 9.1 FL (ref 9–12.9)
POTASSIUM SERPL-SCNC: 4.6 MMOL/L (ref 3.6–5.5)
PROT SERPL-MCNC: 6.4 G/DL (ref 6–8.2)
PROT UR QL STRIP: 30 MG/DL
PROTHROMBIN TIME: 14.3 SEC (ref 12–14.6)
RBC # BLD AUTO: 4.26 M/UL (ref 4.7–6.1)
RBC # URNS HPF: >150 /HPF
RBC UR QL AUTO: ABNORMAL
SODIUM SERPL-SCNC: 134 MMOL/L (ref 135–145)
SP GR UR STRIP.AUTO: 1.01
UROBILINOGEN UR STRIP.AUTO-MCNC: 0.2 MG/DL
WBC # BLD AUTO: 7.7 K/UL (ref 4.8–10.8)
WBC #/AREA URNS HPF: ABNORMAL /HPF

## 2019-02-17 PROCEDURE — 80053 COMPREHEN METABOLIC PANEL: CPT

## 2019-02-17 PROCEDURE — 87077 CULTURE AEROBIC IDENTIFY: CPT

## 2019-02-17 PROCEDURE — 303105 HCHG CATHETER EXTRA

## 2019-02-17 PROCEDURE — 87086 URINE CULTURE/COLONY COUNT: CPT

## 2019-02-17 PROCEDURE — 85730 THROMBOPLASTIN TIME PARTIAL: CPT

## 2019-02-17 PROCEDURE — 51700 IRRIGATION OF BLADDER: CPT

## 2019-02-17 PROCEDURE — 87186 SC STD MICRODIL/AGAR DIL: CPT

## 2019-02-17 PROCEDURE — 81001 URINALYSIS AUTO W/SCOPE: CPT

## 2019-02-17 PROCEDURE — 99284 EMERGENCY DEPT VISIT MOD MDM: CPT

## 2019-02-17 PROCEDURE — 51702 INSERT TEMP BLADDER CATH: CPT

## 2019-02-17 PROCEDURE — 85025 COMPLETE CBC W/AUTO DIFF WBC: CPT

## 2019-02-17 PROCEDURE — 85610 PROTHROMBIN TIME: CPT

## 2019-02-17 RX ORDER — SULFAMETHOXAZOLE AND TRIMETHOPRIM 800; 160 MG/1; MG/1
1 TABLET ORAL 2 TIMES DAILY
Qty: 14 TAB | Refills: 0 | Status: SHIPPED | OUTPATIENT
Start: 2019-02-17 | End: 2019-02-24

## 2019-02-17 NOTE — ED NOTES
Discussed POC, updated on wait status, answered questions, addressed needs, ensured call light within reach. Provided emotional support for pt and family.

## 2019-02-17 NOTE — DISCHARGE INSTRUCTIONS
Hematuria, Adult  Hematuria is blood in your urine. It can be caused by a bladder infection, kidney infection, prostate infection, kidney stone, or cancer of your urinary tract. Infections can usually be treated with medicine, and a kidney stone usually will pass through your urine. If neither of these is the cause of your hematuria, further workup to find out the reason may be needed.  It is very important that you tell your health care provider about any blood you see in your urine, even if the blood stops without treatment or happens without causing pain. Blood in your urine that happens and then stops and then happens again can be a symptom of a very serious condition. Also, pain is not a symptom in the initial stages of many urinary cancers.  Follow these instructions at home:  · Drink lots of fluid, 3-4 quarts a day. If you have been diagnosed with an infection, cranberry juice is especially recommended, in addition to large amounts of water.  · Avoid caffeine, tea, and carbonated beverages because they tend to irritate the bladder.  · Avoid alcohol because it may irritate the prostate.  · Take all medicines as directed by your health care provider.  · If you were prescribed an antibiotic medicine, finish it all even if you start to feel better.  · If you have been diagnosed with a kidney stone, follow your health care provider's instructions regarding straining your urine to catch the stone.  · Empty your bladder often. Avoid holding urine for long periods of time.  · After a bowel movement, women should cleanse front to back. Use each tissue only once.  · Empty your bladder before and after sexual intercourse if you are a female.  Contact a health care provider if:  · You develop back pain.  · You have a fever.  · You have a feeling of sickness in your stomach (nausea) or vomiting.  · Your symptoms are not better in 3 days. Return sooner if you are getting worse.  Get help right away if:  · You develop  severe vomiting and are unable to keep the medicine down.  · You develop severe back or abdominal pain despite taking your medicines.  · You begin passing a large amount of blood or clots in your urine.  · You feel extremely weak or faint, or you pass out.  This information is not intended to replace advice given to you by your health care provider. Make sure you discuss any questions you have with your health care provider.  Document Released: 12/18/2006 Document Revised: 05/25/2017 Document Reviewed: 08/18/2014  [employer] Patient Education © 2017 [employer]

## 2019-02-17 NOTE — ED TRIAGE NOTES
Chief Complaint   Patient presents with   • Blood in Urine   Pt to triage in NAD with family.  Pt wears 3L NC home O2.  Pt reports that he has had a large amount of blood in his urine starting last night.  Pt has a Lazar catheter, states he has had it since around July 2018.  Pt takes plavix and ASA.  Pt educated on triage process and instructed to notify triage RN of any change in status.

## 2019-02-17 NOTE — ED NOTES
Family returned to bedside, updated on pt status and POC. Call light within reach no needs at this time.

## 2019-02-17 NOTE — ED NOTES
Pt roomed, placed in gown, monitors placed, family at bedside, Call light within reach. Awaiting ERP assessment.

## 2019-02-17 NOTE — LETTER
2/20/2019               Garrettmarychuy Alvarez  [address] [address]        Dear Garrett Felix (MR#[id number])    This letter is sent in regards to your, recent visit to the [facility] Emergency Department on 2/17/2019.  During the visit, tests were performed to assist the physician in a medical diagnosis.  A review of those tests requires that we notify you of the following:    Your urine culture was POSITIVE for a bacteria. The antibiotic prescribed for you (sulfamethoxazole-trimethoprim) should be active to treat this bacteria. IT IS IMPORTANT THAT YOU CONTINUE TAKING YOUR ANTIBIOTIC UNTIL IT IS FINISHED.      Please feel free to contact me at the number below if you have any questions or concerns. Thank you for your cooperation in the matter.    Sincerely,  ED Culture Follow-Up Staff  Gretchen Dacosta, PharmD    [facility], Emergency Department  [address] [place] [id number]  [phone] (ED Culture Line)  [phone]

## 2019-02-17 NOTE — ED NOTES
Discussed Discharge instructions, F/U instructions, and medication instructions with Pt and family. Rx given, questions answered. Pt and family escorted out of ED in stable condition.

## 2019-02-17 NOTE — ED PROVIDER NOTES
ED Provider Note    Scribed for Du Foster M.D. by Rebecca Dang. 2/17/2019, 9:12 AM.    Primary care provider: Barbara Wilkins M.D.  Means of arrival: walk-in  History obtained from: patient  History limited by: none    CHIEF COMPLAINT  Chief Complaint   Patient presents with   • Blood in Urine       HPI  Garrett Alvarez is a 84 y.o. male who presents to the Emergency Department for evaluation of mick hematuria onset last night. Patient currently has an indwelling Lazar catheter, that was placed September 2018 secondary to urinary retention. He reports that this is changed every month, last changed 2 weeks ago. Patient follows with Dr. Vázquez, Urology. No complaints of abdominal pain, flank pain. He is currently on Plavix and Aspirin.    REVIEW OF SYSTEMS  Pertinent positives include hematuria. Pertinent negatives include no abdominal pain, flank pain. As above, all other systems reviewed and are negative.   See HPI for further details.     PAST MEDICAL HISTORY   has a past medical history of Centrilobular emphysema (HCC) (12/3/2015); COPD (chronic obstructive pulmonary disease) (HCC); Dyslipidemia; Hypertension; Pulmonary emphysema (HCC); and Pulmonary hypertension (HCC).    SURGICAL HISTORY   has a past surgical history that includes hernia repair (1990).    SOCIAL HISTORY  Social History   Substance Use Topics   • Smoking status: Former Smoker     Quit date: 1/1/2008   • Smokeless tobacco: Never Used   • Alcohol use 1.0 oz/week     2 Cans of beer per week      Comment: week      History   Drug Use No       FAMILY HISTORY  Family History   Problem Relation Age of Onset   • Heart Disease Father    • Heart Attack Brother    • Other Brother         CAD   • Cancer Brother         lung, other       CURRENT MEDICATIONS  Home Medications     Reviewed by Sakina Orr R.N. (Registered Nurse) on 02/17/19 at 0847  Med List Status: <None>   Medication Last Dose Status   albuterol 108 (90 Base)  "MCG/ACT Aero Soln inhalation aerosol  Active   aspirin EC 81 MG EC tablet  Active   atorvastatin (LIPITOR) 10 MG Tab  Active   clopidogrel (PLAVIX) 75 MG Tab  Active   Fluticasone-Umeclidin-Vilant (TRELEGY ELLIPTA) 100-62.5-25 MCG/INH AEROSOL POWDER, BREATH ACTIVATED  Active   lisinopril (PRINIVIL) 20 MG Tab  Active   non-formulary med  Active   tamsulosin (FLOMAX) 0.4 MG capsule  Active                ALLERGIES  No Known Allergies    PHYSICAL EXAM  VITAL SIGNS: /73   Pulse 100   Temp 36.1 °C (97 °F) (Temporal)   Resp 18   Ht 1.702 m (5' 7\")   Wt 59 kg (130 lb 1.1 oz)   SpO2 97%   BMI 20.37 kg/m²   Vitals reviewed.    Constitutional: Alert in no apparent distress.  HENT: No signs of trauma, Bilateral external ears normal, Nose normal.   Eyes: Pupils are equal and reactive, Conjunctiva normal, Non-icteric.   Neck: Normal range of motion, No tenderness, Supple, No stridor.   Lymphatic: No lymphadenopathy noted.   Cardiovascular: Regular rate and rhythm, no murmurs.   Thorax & Lungs: Normal breath sounds, No respiratory distress, No wheezing, No chest tenderness.   Abdomen: Bowel sounds normal, Soft, No tenderness, No peritoneal signs, No masses, No pulsatile masses. : Lazar catheter in place with a leg bag, gross blood in the collected urine.  Skin: Warm, Dry, No erythema, No rash.   Back: No bony tenderness, No CVA tenderness.   Extremities: Intact distal pulses, No edema, No tenderness, No cyanosis  Musculoskeletal: Good range of motion in all major joints. No tenderness to palpation or major deformities noted.   Neurologic: Alert , Normal motor function, Normal sensory function, No focal deficits noted.   Psychiatric: Affect normal, Judgment normal, Mood normal.     DIAGNOSTIC STUDIES / PROCEDURES    LABS  Labs Reviewed   CBC WITH DIFFERENTIAL - Abnormal; Notable for the following:        Result Value    RBC 4.26 (*)     Hemoglobin 12.9 (*)     Hematocrit 41.1 (*)     MCHC 31.4 (*)     " Neutrophils-Polys 73.10 (*)     Lymphocytes 14.90 (*)     All other components within normal limits    Narrative:     Indicate which anticoagulants the patient is on:->NONE   COMP METABOLIC PANEL - Abnormal; Notable for the following:     Sodium 134 (*)     Glucose 105 (*)     All other components within normal limits    Narrative:     Indicate which anticoagulants the patient is on:->NONE   URINALYSIS,CULTURE IF INDICATED - Abnormal; Notable for the following:     Character Cloudy (*)     Protein 30 (*)     Bilirubin Small (*)     Nitrite Positive (*)     Leukocyte Esterase Moderate (*)     Occult Blood Large (*)     All other components within normal limits   URINE MICROSCOPIC (W/UA) - Abnormal; Notable for the following:     WBC  (*)     RBC >150 (*)     Bacteria Moderate (*)     All other components within normal limits   PROTHROMBIN TIME    Narrative:     Indicate which anticoagulants the patient is on:->NONE   APTT    Narrative:     Indicate which anticoagulants the patient is on:->NONE   ESTIMATED GFR    Narrative:     Indicate which anticoagulants the patient is on:->NONE   URINE CULTURE(NEW)      All labs reviewed by me.    COURSE & MEDICAL DECISION MAKING  Nursing notes, VS, PMSFHx reviewed in chart.    Differential diagnoses include but not limited to: UTI, hypercoagulable state, hematuria with unknown cause    Obtained and reviewed past medical records which indicated 2 past urine cultures, both negative.    9:12 AM Patient seen and examined at bedside. He presents today, with a history of urinary retention, Lazar catheter currently in place, for evaluation of gross hematuria onset last night. Ordered for CBC, CMP, PTT, APTT, UA to evaluate. I informed the patient that I would consult with the Urologist as well as evaluate for any acute process or infection with lab work. We will also switch out the current catheter with a 3 way catheter to catch a non contaminated sample and help with flushing.  Patient understands and agrees with treatment plan.     11:27 AM I re-evaluated patient at bedside. With irrigation, urine is now running clear. I informed the patient that his blood work does not show any acute abnormalities at this time, but his UA does indicate a likely infection, which I explained I would treat with antibiotics. I advised the patient to follow up with his Urologist later this week, returning sooner should drainage from his catheter stop, as this could indicate a clot that needs to be flushed. He understands and agrees with treatment plan and discharge.    The patient will return for new or worsening symptoms and is stable at the time of discharge.    DISPOSITION:  Patient will be discharged home in stable condition.    FOLLOW UP:  [facility], Emergency Dept  [address] [facility] [address] [phone]    If symptoms worsen, fever, vomiting, unable to urinate    Barbara Wilkins M.D.  [address]  [address]  [facility] [address] [phone]      As needed    Elvis Zepeda M.D.  [address]  [facility] [id number] [phone]      call for follow up      OUTPATIENT MEDICATIONS:  New Prescriptions    SULFAMETHOXAZOLE-TRIMETHOPRIM (BACTRIM DS) 800-160 MG TABLET    Take 1 Tab by mouth 2 times a day for 7 days.         FINAL IMPRESSION  1. Gross hematuria    2. Urinary tract infection associated with indwelling urethral catheter, initial encounter ([facility])          Rebecca BARBOSA (Scribe), am scribing for, and in the presence of, Du Foster M.D..    Electronically signed by: Rebecca Dang (Scribjeison), 2/17/2019    Du BARBOSA M.D. personally performed the services described in this documentation, as scribed by Rebecca Dang in my presence, and it is both accurate and complete.    The note accurately reflects work and decisions made by me.  Du Foster  2/17/2019  11:33 AM

## 2019-02-19 LAB
BACTERIA UR CULT: ABNORMAL
BACTERIA UR CULT: ABNORMAL
SIGNIFICANT IND 70042: ABNORMAL
SITE SITE: ABNORMAL
SOURCE SOURCE: ABNORMAL

## 2019-02-21 NOTE — ED NOTES
ED Positive Culture Follow-up/Notification Note:    Date: 2/20/19     Patient seen in the ED on 2/17/2019 for mick hematuria with indwelling Lazar catheter. Lazar catheter changed and urine sample collected.  1. Gross hematuria    2. Urinary tract infection associated with indwelling urethral catheter, initial encounter ([facility])       Discharge Medication List as of 2/17/2019 12:02 PM      START taking these medications    Details   !! sulfamethoxazole-trimethoprim (BACTRIM DS) 800-160 MG tablet Take 1 Tab by mouth 2 times a day for 7 days., Disp-14 Tab, R-0, Print Rx Paper      !! sulfamethoxazole-trimethoprim (BACTRIM DS) 800-160 MG tablet Take 1 Tab by mouth 2 times a day for 7 days., Disp-14 Tab, R-0, Normal       !! - Potential duplicate medications found. Please discuss with provider.          Allergies: Patient has no known allergies.     Vitals:    02/17/19 1145 02/17/19 1200 02/17/19 1215 02/17/19 1222   BP:       Pulse: 62 67 71 73   Resp:       Temp:       TempSrc:       SpO2: 100% 100% 100% 100%   Weight:       Height:           Final cultures:   Results     Procedure Component Value Units Date/Time    URINE CULTURE(NEW) [[id number]]  (Abnormal)  (Susceptibility) Collected:  02/17/19 1024    Order Status:  Completed Specimen:  Urine Updated:  02/19/19 0940     Significant Indicator POS (POS)     Source UR     Site -     Urine Culture - (A)      Enterobacter aerogenes  >100,000 cfu/mL   (A)    Culture & Susceptibility     ENTEROBACTER AEROGENES     Antibiotic Sensitivity Microscan Unit Status    Ampicillin Resistant >16 mcg/mL Final    Method: SENSITIVITY, RYAN    Cefepime Sensitive <=8 mcg/mL Final    Method: SENSITIVITY, RYAN    Cefotaxime Sensitive <=2 mcg/mL Final    Method: SENSITIVITY, RYAN    Cefotetan Sensitive <=16 mcg/mL Final    Method: SENSITIVITY, RYAN    Ceftazidime Sensitive <=1 mcg/mL Final    Method: SENSITIVITY, RYAN    Ceftriaxone Sensitive <=8 mcg/mL Final    Method: SENSITIVITY, RYAN     Cefuroxime Sensitive <=4 mcg/mL Final    Method: SENSITIVITY, RYAN    Cephalothin Resistant >16 mcg/mL Final    Method: SENSITIVITY, RYAN    Ciprofloxacin Sensitive <=1 mcg/mL Final    Method: SENSITIVITY, RYAN    Gentamicin Sensitive <=4 mcg/mL Final    Method: SENSITIVITY, RYAN    Levofloxacin Sensitive <=2 mcg/mL Final    Method: SENSITIVITY, RYAN    Nitrofurantoin Intermediate 64 mcg/mL Final    Method: SENSITIVITY, RYAN    Pip/Tazobactam Sensitive <=16 mcg/mL Final    Method: SENSITIVITY, RYAN    Piperacillin Sensitive <=16 mcg/mL Final    Method: SENSITIVITY, RYAN    Tigecycline Sensitive <=2 mcg/mL Final    Method: SENSITIVITY, RYAN    Tobramycin Sensitive <=4 mcg/mL Final    Method: SENSITIVITY, RYAN    Trimeth/Sulfa Sensitive <=2/38 mcg/mL Final    Method: SENSITIVITY, RYAN                       URINALYSIS CULTURE, IF INDICATED [[id number]]  (Abnormal) Collected:  02/17/19 1024    Order Status:  Completed Specimen:  Urine Updated:  02/17/19 1052     Color Brown     Character Cloudy (A)     Specific Gravity 1.015     Ph 7.0     Glucose Negative mg/dL      Ketones Negative mg/dL      Protein 30 (A) mg/dL      Bilirubin Small (A)     Urobilinogen, Urine 0.2     Nitrite Positive (A)     Leukocyte Esterase Moderate (A)     Occult Blood Large (A)     Micro Urine Req Microscopic          Plan:   Appropriate antibiotic therapy prescribed. No changes required based upon culture result.  Sent letter to patient to notify of positive culture result and encourage compliance with prescribed antibiotics.     Gretchen Dacosta

## 2019-03-14 ENCOUNTER — OFFICE VISIT (OUTPATIENT)
Dept: PULMONOLOGY | Facility: HOSPICE | Age: 84
End: 2019-03-14
Payer: MEDICARE

## 2019-03-14 VITALS
WEIGHT: 134 LBS | RESPIRATION RATE: 16 BRPM | HEART RATE: 80 BPM | OXYGEN SATURATION: 95 % | SYSTOLIC BLOOD PRESSURE: 122 MMHG | BODY MASS INDEX: 21.03 KG/M2 | DIASTOLIC BLOOD PRESSURE: 70 MMHG | TEMPERATURE: 98 F | HEIGHT: 67 IN

## 2019-03-14 DIAGNOSIS — I15.0 RENOVASCULAR HYPERTENSION: ICD-10-CM

## 2019-03-14 DIAGNOSIS — R05.9 COUGH: ICD-10-CM

## 2019-03-14 DIAGNOSIS — I27.21 PAH (PULMONARY ARTERY HYPERTENSION) (HCC): ICD-10-CM

## 2019-03-14 DIAGNOSIS — J96.11 CHRONIC RESPIRATORY FAILURE WITH HYPOXIA (HCC): ICD-10-CM

## 2019-03-14 DIAGNOSIS — J43.2 CENTRILOBULAR EMPHYSEMA (HCC): ICD-10-CM

## 2019-03-14 PROCEDURE — 99214 OFFICE O/P EST MOD 30 MIN: CPT | Performed by: NURSE PRACTITIONER

## 2019-03-14 NOTE — PROGRESS NOTES
Chief Complaint   Patient presents with   • COPD     Last Seen 9/13/18       HPI:  Garrett Alvarez is a 84 y.o. year old male here today for follow-up on his chronic obstructive pulmonary disease. He has been followed by Shirley ALCOCER in the past. Pulmonary function tests from 2013 indicated an FEV1 of 0.76 L, 31% predicted with positive bronchodilator response, FEV1/FVC ratio of 35% and a DLCO of 57% predicted. He was recently switched from Advair and Spiriva to Trelegy. He feels this is working well for him. He has a Proair HFA inhaler. He has been on 02 at 4 LPM nocturnally and with exertion. He was decreased to 2.5 LPM 02 as his follow up saturations were 99%. Oxygen saturations today in the office is 95%. CNOX was completed 9/18/2018 on 02 2.5 LPM and indicates a mean 02 of 94.9%.     He was hospitalized in June 2018 for COPD exacerbation and NSTEMI. He was treated with IV antibiotics and steroids. Cardiology was consulted and he was started on ASA and Plavix. He has seen Cardiology outpatient. He was last seen by Dr. Gala Bear in December 2018.      PFT's were updated at his last office visit 9/13/2018 and indicates an FEV1 of 0.88 L, 40% predicted with an FEV1/FVC ratio of 36, DLCO of 41% predicted.        CTA 6/3/2018;  No evidence of pulmonary embolus.  Severe emphysema.  Atherosclerotic disease with coronary artery calcifications.  Bilateral hydronephrosis.     Echo 6/2/2018;  CONCLUSIONS  Normal left ventricular systolic function.  Left ventricular ejection fraction is visually estimated to be 55%.  Abnormal septal motion.  The right ventricle was normal in size and function.  Mild mitral regurgitation.  Moderate tricuspid regurgitation.  Estimated right ventricular systolic pressure is 45 mmHg.  Compared to the images of the study done 5/7/2015 - there has been no   significant change.    He notes an occasional dry cough. He denies mucous production. He notes an occasional sinus  drainage drainage. He is on Lisinopril. He denies current wheeze. He is using his Proair on average 2 times a day. He does feel the Trelegy helps. He denies any fevers or chills. He denies any recent respiratory infections.       Past Medical History:   Diagnosis Date   • Centrilobular emphysema (HCC) 12/3/2015   • COPD (chronic obstructive pulmonary disease) (HCC)    • Dyslipidemia    • Hypertension    • Pulmonary emphysema (HCC)    • Pulmonary hypertension (HCC)        Past Surgical History:   Procedure Laterality Date   • HERNIA REPAIR  1990    right inguinal hernia        Family History   Problem Relation Age of Onset   • Heart Disease Father    • Heart Attack Brother    • Other Brother         CAD   • Cancer Brother         lung, other       Social History     Social History   • Marital status:      Spouse name: N/A   • Number of children: N/A   • Years of education: N/A     Occupational History   • Not on file.     Social History Main Topics   • Smoking status: Former Smoker     Quit date: 1/1/2008   • Smokeless tobacco: Never Used   • Alcohol use 1.0 oz/week     2 Cans of beer per week      Comment: week   • Drug use: No   • Sexual activity: Yes     Partners: Female     Other Topics Concern   • Not on file     Social History Narrative   • No narrative on file         ROS:  Constitutional: Denies fevers, chills, sweats, fatigue, weight loss  Eyes: Denies glasses, vision loss, pain, drainage, double vision  Ears/Nose/Mouth/Throat: Denies rhinitis, nasal congestion, ear ache, difficulty hearing, sore throat, persistent hoarseness, decayed teeth/toothache  Cardiovascular: Denies chest pain, tightness, palpitations, swelling in feet/legs, fainting, difficulty breathing when laying down  Respiratory: See HPI   GI: Denies heartburn, difficulty swallowing, nausea, vomiting, abdominal pain, diarrhea, constipation  : Denies frequent urination, painful urination  Integumentary: Denies rashes, lumps or color  "changes  MSK: Denies painful joints, sore muscles, and back pain.   Neurological: Denies frequent headaches, dizziness, weakness        Current Outpatient Prescriptions   Medication Sig Dispense Refill   • clopidogrel (PLAVIX) 75 MG Tab Take 1 Tab by mouth every day. 90 Tab 3   • atorvastatin (LIPITOR) 10 MG Tab Take 1 Tab by mouth every day. 90 Tab 3   • Fluticasone-Umeclidin-Vilant (TRELEGY ELLIPTA) 100-62.5-25 MCG/INH AEROSOL POWDER, BREATH ACTIVATED Inhale 1 Puff by mouth every day. Rinse mouth after use 1 Each 11   • non-formulary med Inhale 2.5 L/min by mouth Continuous.     • aspirin EC 81 MG EC tablet Take 1 Tab by mouth every day. 30 Tab 0   • albuterol 108 (90 Base) MCG/ACT Aero Soln inhalation aerosol Inhale 2 Puffs by mouth every 6 hours as needed for Shortness of Breath.     • lisinopril (PRINIVIL) 20 MG Tab Take 1 Tab by mouth 2 times a day. 180 Tab 3   • tamsulosin (FLOMAX) 0.4 MG capsule Take 1 Cap by mouth every bedtime. 90 Cap 3     No current facility-administered medications for this visit.        No Known Allergies    Blood pressure 122/70, pulse 80, temperature 36.7 °C (98 °F), temperature source Temporal, resp. rate 16, height 1.702 m (5' 7\"), weight 60.8 kg (134 lb), SpO2 95 %.    PE:   Appearance: Well developed, well nourished, no acute distress  Eyes: PERRL, EOM intact, sclera white, conjunctiva moist  Ears: no lesions or deformities  Hearing: grossly intact  Nose: no lesions or deformities  Oropharynx: tongue normal, posterior pharynx without erythema or exudate  Neck: supple, trachea midline, no masses   Respiratory effort: no intercostal retractions or use of accessory muscles  Lung auscultation: no rales, rhonchi or wheezes  Heart auscultation: no murmur rub or gallop  Extremities: no cyanosis or edema  Abdomen: soft ,non tender, no masses  Gait and Station: normal  Digits and nails: no clubbing, cyanosis, petechiae or nodes.  Cranial nerves: grossly intact  Skin: no rashes, lesions or " ulcers noted  Orientation: Oriented to time, person and place  Mood and affect: mood and affect appropriate, normal interaction with examiner  Judgement: Intact          Assessment:    1. Centrilobular emphysema (HCC)     2. Chronic respiratory failure with hypoxia (HCC)     3. Renovascular hypertension     4. PAH (pulmonary artery hypertension) ([facility])           Plan:    1) He has a dry cough. He is on an ACE inhibitor. He also notes post nasal drip. He denies mucous production, but occasionally feels congested. Recommend trial of saline sinus rinse and Mucinex OTC.   2) Continue Trelegy 1 puff INH daily, rinse mouth after use. Continue Proair HFA inhalers.  3) He did receive a POC recently. Continue 02 2.5 LPM 24/7.   4) He declines referral to Pulmonary Rehab. We discussed importance of routine walking/exercise.   5) Patient is up to date on his Prevnar 13, Pneumovax 23 and Influenza vaccinations.  6) Discussed importance of prompt treatment of acute infectious symptoms.  7) Follow up in 6 months, sooner OV if needed.

## 2019-03-14 NOTE — PATIENT INSTRUCTIONS
Plan:    1) He has a dry cough. He is on an ACE inhibitor. He also notes post nasal drip. He denies mucous production, but occasionally feels congested. Recommend trial of saline sinus rinse and Mucinex OTC.   2) Continue Trelegy 1 puff INH daily, rinse mouth after use. Continue Proair HFA inhalers.  3) He did receive a POC recently. Continue 02 2.5 LPM 24/7.   4) He declines referral to Pulmonary Rehab. We discussed importance of routine walking/exercise.   5) Patient is up to date on his Prevnar 13, Pneumovax 23 and Influenza vaccinations.  6) Discussed importance of prompt treatment of acute infectious symptoms.  7) Follow up in 6 months, sooner OV if needed.

## 2019-03-22 ENCOUNTER — TELEPHONE (OUTPATIENT)
Dept: PULMONOLOGY | Facility: HOSPICE | Age: 84
End: 2019-03-22

## 2019-03-22 DIAGNOSIS — J44.9 CHRONIC OBSTRUCTIVE PULMONARY DISEASE, UNSPECIFIED COPD TYPE (HCC): ICD-10-CM

## 2019-05-28 ENCOUNTER — HOSPITAL ENCOUNTER (OUTPATIENT)
Dept: LAB | Facility: MEDICAL CENTER | Age: 84
End: 2019-05-28
Attending: FAMILY MEDICINE
Payer: MEDICARE

## 2019-05-28 LAB
ALBUMIN SERPL BCP-MCNC: 4.2 G/DL (ref 3.2–4.9)
ALBUMIN/GLOB SERPL: 1.7 G/DL
ALP SERPL-CCNC: 42 U/L (ref 30–99)
ALT SERPL-CCNC: 13 U/L (ref 2–50)
ANION GAP SERPL CALC-SCNC: 4 MMOL/L (ref 0–11.9)
AST SERPL-CCNC: 19 U/L (ref 12–45)
BASOPHILS # BLD AUTO: 0.5 % (ref 0–1.8)
BASOPHILS # BLD: 0.03 K/UL (ref 0–0.12)
BILIRUB SERPL-MCNC: 0.6 MG/DL (ref 0.1–1.5)
BUN SERPL-MCNC: 13 MG/DL (ref 8–22)
CALCIUM SERPL-MCNC: 9.4 MG/DL (ref 8.5–10.5)
CHLORIDE SERPL-SCNC: 95 MMOL/L (ref 96–112)
CO2 SERPL-SCNC: 33 MMOL/L (ref 20–33)
CREAT SERPL-MCNC: 0.77 MG/DL (ref 0.5–1.4)
EOSINOPHIL # BLD AUTO: 0.44 K/UL (ref 0–0.51)
EOSINOPHIL NFR BLD: 7.1 % (ref 0–6.9)
ERYTHROCYTE [DISTWIDTH] IN BLOOD BY AUTOMATED COUNT: 47.8 FL (ref 35.9–50)
GLOBULIN SER CALC-MCNC: 2.5 G/DL (ref 1.9–3.5)
GLUCOSE SERPL-MCNC: 111 MG/DL (ref 65–99)
HCT VFR BLD AUTO: 40.6 % (ref 42–52)
HGB BLD-MCNC: 13.5 G/DL (ref 14–18)
IMM GRANULOCYTES # BLD AUTO: 0.01 K/UL (ref 0–0.11)
IMM GRANULOCYTES NFR BLD AUTO: 0.2 % (ref 0–0.9)
LYMPHOCYTES # BLD AUTO: 1.57 K/UL (ref 1–4.8)
LYMPHOCYTES NFR BLD: 25.3 % (ref 22–41)
MCH RBC QN AUTO: 32.8 PG (ref 27–33)
MCHC RBC AUTO-ENTMCNC: 33.3 G/DL (ref 33.7–35.3)
MCV RBC AUTO: 98.8 FL (ref 81.4–97.8)
MONOCYTES # BLD AUTO: 0.67 K/UL (ref 0–0.85)
MONOCYTES NFR BLD AUTO: 10.8 % (ref 0–13.4)
NEUTROPHILS # BLD AUTO: 3.49 K/UL (ref 1.82–7.42)
NEUTROPHILS NFR BLD: 56.1 % (ref 44–72)
NRBC # BLD AUTO: 0 K/UL
NRBC BLD-RTO: 0 /100 WBC
PLATELET # BLD AUTO: 212 K/UL (ref 164–446)
PMV BLD AUTO: 9.4 FL (ref 9–12.9)
POTASSIUM SERPL-SCNC: 4.9 MMOL/L (ref 3.6–5.5)
PROT SERPL-MCNC: 6.7 G/DL (ref 6–8.2)
RBC # BLD AUTO: 4.11 M/UL (ref 4.7–6.1)
SODIUM SERPL-SCNC: 132 MMOL/L (ref 135–145)
WBC # BLD AUTO: 6.2 K/UL (ref 4.8–10.8)

## 2019-05-28 PROCEDURE — 85025 COMPLETE CBC W/AUTO DIFF WBC: CPT

## 2019-05-28 PROCEDURE — 80053 COMPREHEN METABOLIC PANEL: CPT

## 2019-05-28 PROCEDURE — 36415 COLL VENOUS BLD VENIPUNCTURE: CPT

## 2019-08-12 ENCOUNTER — HOSPITAL ENCOUNTER (OUTPATIENT)
Dept: LAB | Facility: MEDICAL CENTER | Age: 84
End: 2019-08-12
Attending: FAMILY MEDICINE
Payer: MEDICARE

## 2019-08-12 LAB
ALBUMIN SERPL BCP-MCNC: 4.4 G/DL (ref 3.2–4.9)
ALBUMIN/GLOB SERPL: 1.6 G/DL
ALP SERPL-CCNC: 43 U/L (ref 30–99)
ALT SERPL-CCNC: 13 U/L (ref 2–50)
ANION GAP SERPL CALC-SCNC: 7 MMOL/L (ref 0–11.9)
AST SERPL-CCNC: 18 U/L (ref 12–45)
BASOPHILS # BLD AUTO: 0.4 % (ref 0–1.8)
BASOPHILS # BLD: 0.03 K/UL (ref 0–0.12)
BILIRUB SERPL-MCNC: 0.7 MG/DL (ref 0.1–1.5)
BUN SERPL-MCNC: 16 MG/DL (ref 8–22)
CALCIUM SERPL-MCNC: 9.6 MG/DL (ref 8.5–10.5)
CHLORIDE SERPL-SCNC: 92 MMOL/L (ref 96–112)
CHOLEST SERPL-MCNC: 190 MG/DL (ref 100–199)
CO2 SERPL-SCNC: 33 MMOL/L (ref 20–33)
CREAT SERPL-MCNC: 0.73 MG/DL (ref 0.5–1.4)
EOSINOPHIL # BLD AUTO: 0.38 K/UL (ref 0–0.51)
EOSINOPHIL NFR BLD: 5.5 % (ref 0–6.9)
ERYTHROCYTE [DISTWIDTH] IN BLOOD BY AUTOMATED COUNT: 49.4 FL (ref 35.9–50)
FASTING STATUS PATIENT QL REPORTED: NORMAL
GLOBULIN SER CALC-MCNC: 2.8 G/DL (ref 1.9–3.5)
GLUCOSE SERPL-MCNC: 98 MG/DL (ref 65–99)
HCT VFR BLD AUTO: 41.1 % (ref 42–52)
HDLC SERPL-MCNC: 84 MG/DL
HGB BLD-MCNC: 13.4 G/DL (ref 14–18)
IMM GRANULOCYTES # BLD AUTO: 0.03 K/UL (ref 0–0.11)
IMM GRANULOCYTES NFR BLD AUTO: 0.4 % (ref 0–0.9)
LDLC SERPL CALC-MCNC: 95 MG/DL
LYMPHOCYTES # BLD AUTO: 1.46 K/UL (ref 1–4.8)
LYMPHOCYTES NFR BLD: 21.1 % (ref 22–41)
MCH RBC QN AUTO: 32.4 PG (ref 27–33)
MCHC RBC AUTO-ENTMCNC: 32.6 G/DL (ref 33.7–35.3)
MCV RBC AUTO: 99.5 FL (ref 81.4–97.8)
MONOCYTES # BLD AUTO: 0.72 K/UL (ref 0–0.85)
MONOCYTES NFR BLD AUTO: 10.4 % (ref 0–13.4)
NEUTROPHILS # BLD AUTO: 4.3 K/UL (ref 1.82–7.42)
NEUTROPHILS NFR BLD: 62.2 % (ref 44–72)
NRBC # BLD AUTO: 0 K/UL
NRBC BLD-RTO: 0 /100 WBC
PLATELET # BLD AUTO: 211 K/UL (ref 164–446)
PMV BLD AUTO: 10.2 FL (ref 9–12.9)
POTASSIUM SERPL-SCNC: 4.8 MMOL/L (ref 3.6–5.5)
PROT SERPL-MCNC: 7.2 G/DL (ref 6–8.2)
RBC # BLD AUTO: 4.13 M/UL (ref 4.7–6.1)
SODIUM SERPL-SCNC: 132 MMOL/L (ref 135–145)
TRIGL SERPL-MCNC: 54 MG/DL (ref 0–149)
TSH SERPL DL<=0.005 MIU/L-ACNC: 1.88 UIU/ML (ref 0.38–5.33)
WBC # BLD AUTO: 6.9 K/UL (ref 4.8–10.8)

## 2019-08-12 PROCEDURE — 85025 COMPLETE CBC W/AUTO DIFF WBC: CPT

## 2019-08-12 PROCEDURE — 80061 LIPID PANEL: CPT

## 2019-08-12 PROCEDURE — 36415 COLL VENOUS BLD VENIPUNCTURE: CPT

## 2019-08-12 PROCEDURE — 84443 ASSAY THYROID STIM HORMONE: CPT

## 2019-08-12 PROCEDURE — 80053 COMPREHEN METABOLIC PANEL: CPT

## 2019-08-25 DIAGNOSIS — E78.2 MIXED HYPERLIPIDEMIA: Primary | ICD-10-CM

## 2019-08-28 RX ORDER — ATORVASTATIN CALCIUM 10 MG/1
TABLET, FILM COATED ORAL
Qty: 100 TAB | Refills: 1 | Status: SHIPPED | OUTPATIENT
Start: 2019-08-28 | End: 2020-02-28 | Stop reason: SDUPTHER

## 2019-09-02 DIAGNOSIS — J44.9 CHRONIC OBSTRUCTIVE PULMONARY DISEASE, UNSPECIFIED COPD TYPE (HCC): ICD-10-CM

## 2019-09-03 NOTE — TELEPHONE ENCOUNTER
Have we ever prescribed this med? Yes.  If yes, what date? 8/28/18    Last OV: 3/14/19 SEKOU Pérez APRN     Next OV: 9/26/19 VICTOR HUGO Rooney P.A-C.    DX: Chronic obstructive pulmonary disease, unspecified COPD type (HCC) (J44.9)    Medications: TRELEGY ELLIPTA 100-62.5-25 MCG/INH AEROSOL POWDER, BREATH ACTIVATED

## 2019-09-26 ENCOUNTER — OFFICE VISIT (OUTPATIENT)
Dept: PULMONOLOGY | Facility: HOSPICE | Age: 84
End: 2019-09-26
Payer: MEDICARE

## 2019-09-26 VITALS
BODY MASS INDEX: 21.38 KG/M2 | DIASTOLIC BLOOD PRESSURE: 68 MMHG | OXYGEN SATURATION: 93 % | SYSTOLIC BLOOD PRESSURE: 134 MMHG | HEART RATE: 94 BPM | WEIGHT: 133 LBS | RESPIRATION RATE: 16 BRPM | HEIGHT: 66 IN

## 2019-09-26 DIAGNOSIS — Z23 NEED FOR VACCINATION: ICD-10-CM

## 2019-09-26 DIAGNOSIS — J44.9 CHRONIC OBSTRUCTIVE PULMONARY DISEASE, UNSPECIFIED COPD TYPE (HCC): ICD-10-CM

## 2019-09-26 PROCEDURE — 99213 OFFICE O/P EST LOW 20 MIN: CPT | Mod: 25 | Performed by: PHYSICIAN ASSISTANT

## 2019-09-26 PROCEDURE — G0008 ADMIN INFLUENZA VIRUS VAC: HCPCS | Performed by: PHYSICIAN ASSISTANT

## 2019-09-26 PROCEDURE — 90662 IIV NO PRSV INCREASED AG IM: CPT | Performed by: PHYSICIAN ASSISTANT

## 2019-09-26 ASSESSMENT — ENCOUNTER SYMPTOMS
FEVER: 0
CLAUDICATION: 0
SORE THROAT: 0
SHORTNESS OF BREATH: 1
COUGH: 1
DIAPHORESIS: 0
SINUS PAIN: 0
WEIGHT LOSS: 0
EYES NEGATIVE: 1
CHILLS: 0
TREMORS: 1
SPUTUM PRODUCTION: 0
ORTHOPNEA: 0
HEARTBURN: 0
DIZZINESS: 0
HEADACHES: 0
WHEEZING: 0
PALPITATIONS: 0
HEMOPTYSIS: 0
INSOMNIA: 0

## 2019-09-26 NOTE — PROGRESS NOTES
CC: Tires easily with activity    HPI:  Garrett Alvarez is a 85 y.o. year old male here today for follow-up on COPD. Last seen in clinic 3/14/2019.  Patient is a former smoker with reported quit date in 2008.  Continued abstinence advised.    Pertinent history includes pulmonary hypertension and bilateral hydronephrosis, past history of an STEMI followed by cardiology..     Reviewed in clinic vitals including blood pressure 134/68, heart rate 94, O2 sat 93% and BMI of 21.47 kg/m².    Reviewed home medication regimen including Trelegy, Plavix, albuterol.  Patient reports use of albuterol approximately once per month.  Request refill on Trelegy from sample pharmacy to help with $200/mo cost of Trelegy in [facility].  He reports he is on O2 at 3 L pulsed and 2.5 L at home.  Preferred is his DME.    Reviewed most recent imaging including CTA 6/3/2018 demonstrating no evidence of PE, severe emphysema, atherosclerotic disease with coronary artery calcification bilateral hydronephrosis.    Echocardiogram obtained 6/2/2018 demonstrated normal left ventricular systolic function, LVEF estimated at 55%, abnormal septal motion, normal size and function of right ventricle, mild mitral regurgitation, moderate tricuspid regurgitation and estimated RVSP of 45 mmHg.    Most recent pulmonary function testing obtained 9/13/2018 demonstrated severe reduction in FEV1, severely reduced FEV1/FVC ratio, 9% improvement in FEV1 postbronchodilator, hyperinflation, severely reduced DLCO, increased airway resistance.  Per pulmonologist interpretation severe obstructive pulmonary disease with partial reversibility, reduction DLCO compatible with clinical diagnosis of emphysema.    Review of Systems   Constitutional: Positive for malaise/fatigue (gets tired easily with activity ). Negative for chills, diaphoresis, fever and weight loss.   HENT: Positive for congestion (intermittent) and hearing loss (deaf right ear). Negative for  nosebleeds, sinus pain, sore throat and tinnitus.    Eyes: Negative.    Respiratory: Positive for cough (occasional, dry ) and shortness of breath (with activity ). Negative for hemoptysis, sputum production and wheezing.    Cardiovascular: Positive for leg swelling. Negative for chest pain, palpitations, orthopnea and claudication.   Gastrointestinal: Negative for heartburn.        Top dentures, no difficulty swallowing    Skin: Negative.    Neurological: Positive for tremors (mild). Negative for dizziness and headaches.   Psychiatric/Behavioral: The patient does not have insomnia.        Past Medical History:   Diagnosis Date   • Centrilobular emphysema (HCC) 12/3/2015   • COPD (chronic obstructive pulmonary disease) (HCC)    • Dyslipidemia    • Hypertension    • Pulmonary emphysema (HCC)    • Pulmonary hypertension (HCC)        Past Surgical History:   Procedure Laterality Date   • HERNIA REPAIR      right inguinal hernia        Family History   Problem Relation Age of Onset   • Heart Disease Father    • Heart Attack Brother    • Other Brother         CAD   • Cancer Brother         lung, other       Social History     Socioeconomic History   • Marital status:      Spouse name: Not on file   • Number of children: Not on file   • Years of education: Not on file   • Highest education level: Not on file   Occupational History   • Not on file   Social Needs   • Financial resource strain: Not on file   • Food insecurity:     Worry: Not on file     Inability: Not on file   • Transportation needs:     Medical: Not on file     Non-medical: Not on file   Tobacco Use   • Smoking status: Former Smoker     Last attempt to quit: 2008     Years since quittin.7   • Smokeless tobacco: Never Used   Substance and Sexual Activity   • Alcohol use: Yes     Alcohol/week: 1.0 oz     Types: 2 Cans of beer per week     Comment: week   • Drug use: No   • Sexual activity: Yes     Partners: Female   Lifestyle   • Physical  "activity:     Days per week: Not on file     Minutes per session: Not on file   • Stress: Not on file   Relationships   • Social connections:     Talks on phone: Not on file     Gets together: Not on file     Attends Latter-day service: Not on file     Active member of club or organization: Not on file     Attends meetings of clubs or organizations: Not on file     Relationship status: Not on file   • Intimate partner violence:     Fear of current or ex partner: Not on file     Emotionally abused: Not on file     Physically abused: Not on file     Forced sexual activity: Not on file   Other Topics Concern   • Not on file   Social History Narrative   • Not on file       Allergies as of 09/26/2019   • (No Known Allergies)        @Vital signs for this encounter:  Vitals:    09/26/19 1308   Height: 1.676 m (5' 6\")   Weight: 60.3 kg (133 lb)   Weight % change since last entry.: 0 %   BP: 134/68   Pulse: 94   BMI (Calculated): 21.47   Resp: 16       Current medications as of today   Current Outpatient Medications   Medication Sig Dispense Refill   • TRELEGY ELLIPTA 100-62.5-25 MCG/INH AEROSOL POWDER, BREATH ACTIVATED INHALE 1 PUFF BY MOUTH ONCE DAILY RINSE MOUTH AFTER USE 1 Each 1   • atorvastatin (LIPITOR) 10 MG Tab TAKE ONE TABLET BY MOUTH ONCE DAILY 100 Tab 1   • clopidogrel (PLAVIX) 75 MG Tab Take 1 Tab by mouth every day. 90 Tab 3   • aspirin EC 81 MG EC tablet Take 1 Tab by mouth every day. 30 Tab 0   • albuterol 108 (90 Base) MCG/ACT Aero Soln inhalation aerosol Inhale 2 Puffs by mouth every 6 hours as needed for Shortness of Breath.     • lisinopril (PRINIVIL) 20 MG Tab Take 1 Tab by mouth 2 times a day. 180 Tab 3   • tamsulosin (FLOMAX) 0.4 MG capsule Take 1 Cap by mouth every bedtime. 90 Cap 3   • non-formulary med Inhale 2.5 L/min by mouth Continuous.       No current facility-administered medications for this visit.          Physical Exam:   Gen:           Alert and oriented, No apparent distress. Mood and " affect     appropriate, normal interaction with provider.  Eyes:          sclere white, conjunctive moist.  Hearing:     Grossly intact.  Dentition:    Upper denture, fair dentition lower  Oropharynx:   Tongue mild changes, posterior pharynx without erythema or exudate.  Neck:        Supple, trachea midline, no masses.  Respiratory Effort: No intercostal retractions, + use of accessory muscles.   Lung Auscultation:     Diminished throughout; no rales, rhonchi or wheezing.  CV:            Regular rate and rhythm.  1+ pretibial edema on right. No murmurs, rubs or gallops.  Digits, Nails, Ext: No clubbing, cyanosis, petechiae, or nodes.   Skin:        No rashes, lesions or ulcers noted on back or exposed skin surfaces.                     Assessment:  1. Need for vaccination  Influenza Vaccine, High Dose (65+ Only)   2. Chronic obstructive pulmonary disease, unspecified COPD type ([facility])  Fluticasone-Umeclidin-Vilant (TRELEGY ELLIPTA) 100-62.5-25 MCG/INH AEROSOL POWDER, BREATH ACTIVATED       Immunizations:    Flu: 9/26/2019  Pneumovax 23: 4/18/2018  Prevnar 13: 10/21/2014    Plan:    1-continue same meds  2-sample for Trelegy to [facility] pharmacy on [address]    #[phone]  3-flu shot today  4-possible early thrush   5-reviewed oral hygiene, try taking with denture out  6-follow up six month    This dictation was created using voice recognition software. The accuracy of the dictation is limited to the abilities of the software. I expect there may be some errors of grammar and possibly content.

## 2019-09-26 NOTE — PATIENT INSTRUCTIONS
1-continue same meds  2-sample for Trelegy to [facility] pharmacy on [address]    #[phone]  3-flu shot today  4-reviewed oral hygiene, try taking with denture out  5-follow up six month

## 2019-10-15 ENCOUNTER — HOSPITAL ENCOUNTER (OUTPATIENT)
Dept: LAB | Facility: MEDICAL CENTER | Age: 84
End: 2019-10-15
Attending: FAMILY MEDICINE
Payer: MEDICARE

## 2019-10-15 LAB
ANION GAP SERPL CALC-SCNC: 8 MMOL/L (ref 0–11.9)
BASOPHILS # BLD AUTO: 0.5 % (ref 0–1.8)
BASOPHILS # BLD: 0.04 K/UL (ref 0–0.12)
BUN SERPL-MCNC: 14 MG/DL (ref 8–22)
CALCIUM SERPL-MCNC: 9.3 MG/DL (ref 8.5–10.5)
CHLORIDE SERPL-SCNC: 93 MMOL/L (ref 96–112)
CO2 SERPL-SCNC: 29 MMOL/L (ref 20–33)
CREAT SERPL-MCNC: 0.66 MG/DL (ref 0.5–1.4)
EOSINOPHIL # BLD AUTO: 0.22 K/UL (ref 0–0.51)
EOSINOPHIL NFR BLD: 2.8 % (ref 0–6.9)
ERYTHROCYTE [DISTWIDTH] IN BLOOD BY AUTOMATED COUNT: 48.9 FL (ref 35.9–50)
FASTING STATUS PATIENT QL REPORTED: NORMAL
FERRITIN SERPL-MCNC: 482.6 NG/ML (ref 22–322)
GLUCOSE SERPL-MCNC: 94 MG/DL (ref 65–99)
HCT VFR BLD AUTO: 39.5 % (ref 42–52)
HGB BLD-MCNC: 12.5 G/DL (ref 14–18)
IMM GRANULOCYTES # BLD AUTO: 0.04 K/UL (ref 0–0.11)
IMM GRANULOCYTES NFR BLD AUTO: 0.5 % (ref 0–0.9)
LYMPHOCYTES # BLD AUTO: 1.38 K/UL (ref 1–4.8)
LYMPHOCYTES NFR BLD: 17.9 % (ref 22–41)
MCH RBC QN AUTO: 31 PG (ref 27–33)
MCHC RBC AUTO-ENTMCNC: 31.6 G/DL (ref 33.7–35.3)
MCV RBC AUTO: 98 FL (ref 81.4–97.8)
MONOCYTES # BLD AUTO: 0.76 K/UL (ref 0–0.85)
MONOCYTES NFR BLD AUTO: 9.8 % (ref 0–13.4)
NEUTROPHILS # BLD AUTO: 5.29 K/UL (ref 1.82–7.42)
NEUTROPHILS NFR BLD: 68.5 % (ref 44–72)
NRBC # BLD AUTO: 0 K/UL
NRBC BLD-RTO: 0 /100 WBC
PLATELET # BLD AUTO: 372 K/UL (ref 164–446)
PMV BLD AUTO: 9.6 FL (ref 9–12.9)
POTASSIUM SERPL-SCNC: 4.8 MMOL/L (ref 3.6–5.5)
RBC # BLD AUTO: 4.03 M/UL (ref 4.7–6.1)
SODIUM SERPL-SCNC: 130 MMOL/L (ref 135–145)
WBC # BLD AUTO: 7.7 K/UL (ref 4.8–10.8)

## 2019-10-15 PROCEDURE — 82728 ASSAY OF FERRITIN: CPT

## 2019-10-15 PROCEDURE — 36415 COLL VENOUS BLD VENIPUNCTURE: CPT

## 2019-10-15 PROCEDURE — 80048 BASIC METABOLIC PNL TOTAL CA: CPT

## 2019-10-15 PROCEDURE — 85025 COMPLETE CBC W/AUTO DIFF WBC: CPT

## 2019-10-28 DIAGNOSIS — J44.9 CHRONIC OBSTRUCTIVE PULMONARY DISEASE, UNSPECIFIED COPD TYPE (HCC): ICD-10-CM

## 2019-10-28 NOTE — TELEPHONE ENCOUNTER
Patient LVM requesting Sample      Have we ever prescribed this med? Yes.  If yes, what date? 09/26/19 Irma REEVES    Last OV: 09/26/19 Irma REEVES    Next OV: 03/26/20 Irma REEVES    DX: Chronic obstructive pulmonary disease, unspecified COPD type (HCC)    Medications: Fluticasone-Umeclidin-Vilant (TRELEGY ELLIPTA) 100-62.5-25 MCG/INH AEROSOL POWDER, BREATH ACTIVATED

## 2019-10-29 NOTE — TELEPHONE ENCOUNTER
Dispensed Trelegy 100/62.5/25[facility] samples x2.    Lot#: [id number] x2  Exp: Rob 2021 x2    Provider: Irma REEVES    Logged distribution of sample on data sheet.     Dispensed by: Mireya DUMONT informing patient RX is ready for  at [address]

## 2019-10-31 ENCOUNTER — HOSPITAL ENCOUNTER (OUTPATIENT)
Dept: CARDIOLOGY | Facility: MEDICAL CENTER | Age: 84
End: 2019-10-31
Attending: FAMILY MEDICINE
Payer: MEDICARE

## 2019-10-31 DIAGNOSIS — I27.20 PROGRESSIVE PULMONARY HYPERTENSION (HCC): ICD-10-CM

## 2019-10-31 LAB
LV EJECT FRACT  99904: 55
LV EJECT FRACT MOD 2C 99903: 52.58
LV EJECT FRACT MOD 4C 99902: 56.59
LV EJECT FRACT MOD BP 99901: 54.54

## 2019-10-31 PROCEDURE — 93306 TTE W/DOPPLER COMPLETE: CPT

## 2019-10-31 PROCEDURE — 93306 TTE W/DOPPLER COMPLETE: CPT | Mod: 26 | Performed by: INTERNAL MEDICINE

## 2020-01-06 ENCOUNTER — OFFICE VISIT (OUTPATIENT)
Dept: CARDIOLOGY | Facility: MEDICAL CENTER | Age: 85
End: 2020-01-06
Payer: MEDICARE

## 2020-01-06 VITALS
HEART RATE: 82 BPM | DIASTOLIC BLOOD PRESSURE: 72 MMHG | OXYGEN SATURATION: 96 % | HEIGHT: 65 IN | BODY MASS INDEX: 22.16 KG/M2 | SYSTOLIC BLOOD PRESSURE: 138 MMHG | WEIGHT: 133 LBS

## 2020-01-06 DIAGNOSIS — I27.21 PAH (PULMONARY ARTERY HYPERTENSION) (HCC): ICD-10-CM

## 2020-01-06 DIAGNOSIS — I34.0 MODERATE MITRAL REGURGITATION: ICD-10-CM

## 2020-01-06 DIAGNOSIS — I10 ESSENTIAL HYPERTENSION: ICD-10-CM

## 2020-01-06 DIAGNOSIS — E78.2 MIXED HYPERLIPIDEMIA: ICD-10-CM

## 2020-01-06 DIAGNOSIS — J43.2 CENTRILOBULAR EMPHYSEMA (HCC): ICD-10-CM

## 2020-01-06 PROCEDURE — 99214 OFFICE O/P EST MOD 30 MIN: CPT | Performed by: INTERNAL MEDICINE

## 2020-01-06 NOTE — PROGRESS NOTES
"CARDIOLOGY OUTPATIENT FOLLOWUP    PCP: Barbara Wilkins M.D.    1. Moderate mitral regurgitation  No new symptoms.  -Annual echocardiogram, due next fall    2. PAH (pulmonary artery hypertension) (HCC)  Moderate.  Euvolemic.    3. Mixed hyperlipidemia  Well-regulated.  Continue statin    4. Essential hypertension  Well-controlled.  Continue current medication    5. Centrilobular emphysema (HCC)    6.  NSTEMI 2018: Noninvasive management.  Normal LVEF no angina.  - Stop Plavix continue aspirin atorvastatin      Follow up with Ayo Sr M.D. in 1 year    Chief Complaint   Patient presents with   • Pulmonary Hypertension       History: Garrett Alvarez is a 85 y.o. male with a past medical history of COPD presenting for follow up of NSTEMI and cardiac disease.  He is a former patient of Dr. MILTON Bear.  He had an altered consciousness episode back in 2018 with resultant diagnosis of non-STEMI.  He was managed noninvasively with dual antiplatelet therapy.  He has since been free of any cardiovascular symptoms.  Day by day he is limited by exertional dyspnea which is largely stable.  This is attributed to emphysema.  He wears home oxygen.  He is able to do most activities and is overall content with his quality of life.  He is not experiencing angina edema or near syncope.    ROS:  All other systems reviewed and negative except as per the HPI    PE:  /72 (BP Location: Right arm, Patient Position: Sitting, BP Cuff Size: Adult)   Pulse 82   Ht 1.651 m (5' 5\")   Wt 60.3 kg (133 lb)   SpO2 96%   BMI 22.13 kg/m²   Gen: Well appearing  HEENT: Symmetric face. Anicteric sclerae. Moist mucus membranes  NECK: No JVD. No lymphadenopathy  CARDIAC: Quiet cardiac tones.  Normal PMI, regular, normal S1, S2.  VASCULATURE: Normal carotid amplitude without bruit.   RESP: Clear to auscultation bilaterally  ABD: Soft, non-tender, non-distended  EXT: No edema, no clubbing or cyanosis  SKIN: Warm and dry  NEURO: No gross " deficits  PSYCH: Appropriate affect, participates in conversation    Past Medical History:   Diagnosis Date   • Centrilobular emphysema (HCC) 12/3/2015   • COPD (chronic obstructive pulmonary disease) (HCC)    • Dyslipidemia    • Hypertension    • Pulmonary emphysema (HCC)    • Pulmonary hypertension (HCC)      No Known Allergies  Outpatient Encounter Medications as of 1/6/2020   Medication Sig Dispense Refill   • Fluticasone-Umeclidin-Vilant (TRELEGY ELLIPTA) 100-62.5-25 MCG/INH AEROSOL POWDER, BREATH ACTIVATED Inhale 1 Puff by mouth every day. Rinse mouth after use. Sample please 2 Each 0   • TRELEGY ELLIPTA 100-62.5-25 MCG/INH AEROSOL POWDER, BREATH ACTIVATED INHALE 1 PUFF BY MOUTH ONCE DAILY RINSE MOUTH AFTER USE 1 Each 1   • atorvastatin (LIPITOR) 10 MG Tab TAKE ONE TABLET BY MOUTH ONCE DAILY 100 Tab 1   • non-formulary med Inhale 2.5 L/min by mouth Continuous.     • aspirin EC 81 MG EC tablet Take 1 Tab by mouth every day. 30 Tab 0   • albuterol 108 (90 Base) MCG/ACT Aero Soln inhalation aerosol Inhale 2 Puffs by mouth every 6 hours as needed for Shortness of Breath.     • lisinopril (PRINIVIL) 20 MG Tab Take 1 Tab by mouth 2 times a day. 180 Tab 3   • tamsulosin (FLOMAX) 0.4 MG capsule Take 1 Cap by mouth every bedtime. 90 Cap 3   • [DISCONTINUED] clopidogrel (PLAVIX) 75 MG Tab Take 1 Tab by mouth every day. 90 Tab 3     No facility-administered encounter medications on file as of 1/6/2020.      Social History     Socioeconomic History   • Marital status:      Spouse name: Not on file   • Number of children: Not on file   • Years of education: Not on file   • Highest education level: Not on file   Occupational History   • Not on file   Social Needs   • Financial resource strain: Not on file   • Food insecurity:     Worry: Not on file     Inability: Not on file   • Transportation needs:     Medical: Not on file     Non-medical: Not on file   Tobacco Use   • Smoking status: Former Smoker     Last attempt  to quit: 2008     Years since quittin.0   • Smokeless tobacco: Never Used   • Tobacco comment: Continued abstinence advised   Substance and Sexual Activity   • Alcohol use: Yes     Alcohol/week: 1.0 oz     Types: 2 Cans of beer per week     Comment: week   • Drug use: No   • Sexual activity: Yes     Partners: Female   Lifestyle   • Physical activity:     Days per week: Not on file     Minutes per session: Not on file   • Stress: Not on file   Relationships   • Social connections:     Talks on phone: Not on file     Gets together: Not on file     Attends Mandaeism service: Not on file     Active member of club or organization: Not on file     Attends meetings of clubs or organizations: Not on file     Relationship status: Not on file   • Intimate partner violence:     Fear of current or ex partner: Not on file     Emotionally abused: Not on file     Physically abused: Not on file     Forced sexual activity: Not on file   Other Topics Concern   • Not on file   Social History Narrative   • Not on file       Studies  Lab Results   Component Value Date/Time    CHOLSTRLTOT 190 2019 11:02 AM    LDL 95 2019 11:02 AM    HDL 84 2019 11:02 AM    TRIGLYCERIDE 54 2019 11:02 AM       Lab Results   Component Value Date/Time    SODIUM 130 (L) 10/15/2019 10:47 AM    POTASSIUM 4.8 10/15/2019 10:47 AM    CHLORIDE 93 (L) 10/15/2019 10:47 AM    CO2 29 10/15/2019 10:47 AM    GLUCOSE 94 10/15/2019 10:47 AM    BUN 14 10/15/2019 10:47 AM    CREATININE 0.66 10/15/2019 10:47 AM    CREATININE 1.1 2008 07:59 AM     Lab Results   Component Value Date/Time    ALKPHOSPHAT 43 2019 11:02 AM    ASTSGOT 18 2019 11:02 AM    ALTSGPT 13 2019 11:02 AM    TBILIRUBIN 0.7 2019 11:02 AM        For this encounter I directly reviewed ECG tracings and medical records I agree with the interpretations in the electronic health record

## 2020-01-15 DIAGNOSIS — J44.9 CHRONIC OBSTRUCTIVE PULMONARY DISEASE, UNSPECIFIED COPD TYPE (HCC): ICD-10-CM

## 2020-01-15 NOTE — TELEPHONE ENCOUNTER
Have we ever prescribed this med? Yes.  If yes, what date? 10/28/19    Last OV: 09/26/19    Next OV: 03/26/20    DX: COPD    Medications: Trelegy

## 2020-02-20 ENCOUNTER — OFFICE VISIT (OUTPATIENT)
Dept: PULMONOLOGY | Facility: HOSPICE | Age: 85
End: 2020-02-20
Payer: MEDICARE

## 2020-02-20 VITALS
BODY MASS INDEX: 22.66 KG/M2 | DIASTOLIC BLOOD PRESSURE: 70 MMHG | HEART RATE: 102 BPM | RESPIRATION RATE: 16 BRPM | HEIGHT: 65 IN | OXYGEN SATURATION: 92 % | SYSTOLIC BLOOD PRESSURE: 110 MMHG | WEIGHT: 136 LBS

## 2020-02-20 DIAGNOSIS — J96.11 CHRONIC RESPIRATORY FAILURE WITH HYPOXIA (HCC): ICD-10-CM

## 2020-02-20 DIAGNOSIS — J44.1 COPD EXACERBATION (HCC): ICD-10-CM

## 2020-02-20 PROCEDURE — 99213 OFFICE O/P EST LOW 20 MIN: CPT | Performed by: PHYSICIAN ASSISTANT

## 2020-02-20 RX ORDER — METHYLPREDNISOLONE 4 MG/1
TABLET ORAL
Qty: 21 TAB | Refills: 0 | Status: SHIPPED
Start: 2020-02-20 | End: 2020-03-10

## 2020-02-20 ASSESSMENT — ENCOUNTER SYMPTOMS
WHEEZING: 0
CHILLS: 0
HEARTBURN: 0
CLAUDICATION: 0
SORE THROAT: 0
SHORTNESS OF BREATH: 1
DIZZINESS: 0
ORTHOPNEA: 0
SINUS PAIN: 0
EYES NEGATIVE: 1
FEVER: 0
SPUTUM PRODUCTION: 1
COUGH: 1
PALPITATIONS: 0
HEADACHES: 0
WEIGHT LOSS: 0

## 2020-02-20 NOTE — PROGRESS NOTES
CC:    HPI:  Garrett Alvarez is a 85 y.o. year old male here today for flareup of his COPD.  Patient is accompanied by his wife and daughter today.  Apparently he experienced a recent cold and slipped in the shower with no bruising or signs of abrasion, for which he saw his primary care provider who is not in the [facility] system.  He was prescribed Omnicef but no steroids.    Last seen in clinic 9/26/2019.  He is a former smoker with reported quit date in 2008 and 54-pack-year history.  Continued abstinence was advised.    Pertinent past medical history includes pulmonary hypertension, centrilobular emphysema, sepsis, abnormal EKG, chronic respiratory failure on O2, bilateral hydronephrosis, STEMI.    Reviewed in clinic vitals including blood pressure 110/70, heart rate of 102, O2 sat of 92% and BMI of 22.63 kg/m². He is on O2 at 3 L and occasionally 4 L with exertion.    Reviewed home medication regimen which includes Trelegy, albuterol inhaler, O2.  He does have a home nebulizer prescribed by his primary unsure if albuterol or DuoNeb.  Patient does report using nebulizer as needed.  Has been 2 or 3 times a day recently.    Reviewed most recent imaging including CTA 6/3/2018 demonstrating no evidence of PE, severe emphysema, atherosclerotic disease with coronary artery calcification bilateral hydronephrosis.    Echocardiogram obtained 10/31/2019 demonstrated normal left ventricular chamber size, wall thickness and systolic function.  LVEF estimated 55%, indeterminant diastolic function, normal right ventricular size and systolic function, enlarged right atrium, moderately dilated left atrium, estimated RVSP of 60 mmHg was 45 prior.  Severity of mitral regurgitation appears to have worsened.  Follow-up echo as ordered by cardiology.    Most recent pulmonary function testing obtained 9/13/2018 demonstrated severe reduction in FEV1, severely reduced FEV1/FVC ratio, 9% improvement in FEV1 postbronchodilator,  hyperinflation, severely reduced DLCO, increased airway resistance.  Per pulmonologist interpretation severe obstructive pulmonary disease with partial reversibility, reduction DLCO compatible with clinical diagnosis of emphysema.    Review of Systems   Constitutional: Positive for malaise/fatigue. Negative for chills, fever and weight loss.   HENT: Positive for congestion (alternating with runny nose) and hearing loss. Negative for nosebleeds, sinus pain, sore throat and tinnitus.    Eyes: Negative.    Respiratory: Positive for cough, sputum production (yellow) and shortness of breath (with activity). Negative for wheezing.    Cardiovascular: Negative for chest pain, palpitations, orthopnea, claudication and leg swelling.   Gastrointestinal: Negative for heartburn.        Upper, no difficulty swallowing    Skin: Negative.    Neurological: Negative for dizziness and headaches.       Past Medical History:   Diagnosis Date   • Centrilobular emphysema (HCC) 12/3/2015   • COPD (chronic obstructive pulmonary disease) (HCC)    • Dyslipidemia    • Hypertension    • Pulmonary emphysema (HCC)    • Pulmonary hypertension (HCC)        Past Surgical History:   Procedure Laterality Date   • HERNIA REPAIR  1990    right inguinal hernia        Family History   Problem Relation Age of Onset   • Heart Disease Father    • Heart Attack Brother    • Other Brother         CAD   • Cancer Brother         lung, other       Social History     Socioeconomic History   • Marital status:      Spouse name: Not on file   • Number of children: Not on file   • Years of education: Not on file   • Highest education level: Not on file   Occupational History   • Not on file   Social Needs   • Financial resource strain: Not on file   • Food insecurity     Worry: Not on file     Inability: Not on file   • Transportation needs     Medical: Not on file     Non-medical: Not on file   Tobacco Use   • Smoking status: Former Smoker     Last attempt to  "quit: 2008     Years since quittin.1   • Smokeless tobacco: Never Used   • Tobacco comment: Continued abstinence advised   Substance and Sexual Activity   • Alcohol use: Yes     Alcohol/week: 1.0 oz     Types: 2 Cans of beer per week     Comment: week   • Drug use: No   • Sexual activity: Yes     Partners: Female   Lifestyle   • Physical activity     Days per week: Not on file     Minutes per session: Not on file   • Stress: Not on file   Relationships   • Social connections     Talks on phone: Not on file     Gets together: Not on file     Attends Hoahaoism service: Not on file     Active member of club or organization: Not on file     Attends meetings of clubs or organizations: Not on file     Relationship status: Not on file   • Intimate partner violence     Fear of current or ex partner: Not on file     Emotionally abused: Not on file     Physically abused: Not on file     Forced sexual activity: Not on file   Other Topics Concern   • Not on file   Social History Narrative   • Not on file       Allergies as of 2020   • (No Known Allergies)        @Vital signs for this encounter:  Vitals:    20 1444   Height: 1.651 m (5' 5\")   Weight: 61.7 kg (136 lb)   Weight % change since last entry.: 0 %   BP: 110/70   Pulse: (!) 102   BMI (Calculated): 22.63   Resp: 16       Current medications as of today   Current Outpatient Medications   Medication Sig Dispense Refill   • Fluticasone-Umeclidin-Vilant (TRELEGY ELLIPTA) 100-62.5-25 MCG/INH AEROSOL POWDER, BREATH ACTIVATED Inhale 1 Puff by mouth every day. Rinse mouth after use 1 Each 11   • TRELEGY ELLIPTA 100-62.5-25 MCG/INH AEROSOL POWDER, BREATH ACTIVATED INHALE 1 PUFF BY MOUTH ONCE DAILY RINSE MOUTH AFTER USE 1 Each 1   • atorvastatin (LIPITOR) 10 MG Tab TAKE ONE TABLET BY MOUTH ONCE DAILY 100 Tab 1   • non-formulary med Inhale 2.5 L/min by mouth Continuous.     • aspirin EC 81 MG EC tablet Take 1 Tab by mouth every day. 30 Tab 0   • albuterol 108 " (90 Base) MCG/ACT Aero Soln inhalation aerosol Inhale 2 Puffs by mouth every 6 hours as needed for Shortness of Breath.     • lisinopril (PRINIVIL) 20 MG Tab Take 1 Tab by mouth 2 times a day. 180 Tab 3   • tamsulosin (FLOMAX) 0.4 MG capsule Take 1 Cap by mouth every bedtime. 90 Cap 3   • Fluticasone-Umeclidin-Vilant (TRELEGY ELLIPTA) 100-62.5-25 MCG/INH AEROSOL POWDER, BREATH ACTIVATED Inhale 1 Puff by mouth every day. Rinse mouth after use. Sample please 2 Each 0     No current facility-administered medications for this visit.          Physical Exam:   Gen:           Alert and oriented, No apparent distress. Mood and affect     appropriate, normal interaction with provider.  Eyes:          sclere white, conjunctive moist.  Hearing:     Grossly intact.  Dentition:    Upper dentures, fair lower dentition  Oropharynx:   Tongue early signs of thrush, posterior pharynx with mild erythema no exudate.  Neck:        Supple, trachea midline, no masses.  Respiratory Effort: No intercostal retractions, + use of accessory muscles.   Lung Auscultation:      Few crackles bases; no rales, rhonchi or wheezing.  CV:            Regular rate and rhythm. No edema. No murmurs, rubs or gallops.  Digits, Nails, Ext: No clubbing, cyanosis, petechiae, or nodes.   Skin:        No rashes, lesions or ulcers noted on back or exposed skin surfaces.                     Assessment:  1. COPD exacerbation (HCC)  methylPREDNISolone (MEDROL DOSEPAK) 4 MG Tablet Therapy Pack   2. Chronic respiratory failure with hypoxia (HCC)   currently requiring 3 to 4 L O2 ATC       Immunizations:    Flu: 9/26/2019  Pneumovax 23: 4/18/2019  Prevnar 13: 10/21/2014    Plan:  1-Mucinex aka guaifenesin liquid at bedtime  2-mild steroid course   -take with food, early in day  3-reviewed oral hygiene again   -dentures out to take   -swish and spit   -swish and swallow   -brush teeth, gums and tongue   4-will try increasing oral hygiene  5-treat thrush with medication if  doesn't improve or if vocal changes or sore throat  6-follow up in 3-4 months, sooner if needed      This dictation was created using voice recognition software. The accuracy of the dictation is limited to the abilities of the software. I expect there may be some errors of grammar and possibly content.

## 2020-02-20 NOTE — PATIENT INSTRUCTIONS
1-Mucinex aka guaifenesin liquid at bedtime  2-mild steroid course   -take with food, early in day  3-reviewed oral hygiene   -dentures out to take   -swish and spit   -swish and swallow   -brush teeth, gums and tongue   4-will try increasing oral hygiene  5-treat thrush with medication if doesn't improve or if vocal changes or sore throat  6-follow up in 3-4 months, sooner if needed

## 2020-02-21 PROBLEM — J96.11 CHRONIC RESPIRATORY FAILURE WITH HYPOXIA (HCC): Status: ACTIVE | Noted: 2020-02-21

## 2020-02-21 PROBLEM — J44.1 COPD EXACERBATION (HCC): Status: ACTIVE | Noted: 2020-02-21

## 2020-02-28 DIAGNOSIS — E78.2 MIXED HYPERLIPIDEMIA: ICD-10-CM

## 2020-02-28 RX ORDER — ATORVASTATIN CALCIUM 10 MG/1
10 TABLET, FILM COATED ORAL DAILY
Qty: 100 TAB | Refills: 3 | Status: SHIPPED
Start: 2020-02-28 | End: 2020-04-09

## 2020-03-10 ENCOUNTER — OFFICE VISIT (OUTPATIENT)
Dept: CARDIOLOGY | Facility: MEDICAL CENTER | Age: 85
End: 2020-03-10
Payer: MEDICARE

## 2020-03-10 VITALS
OXYGEN SATURATION: 96 % | BODY MASS INDEX: 21.66 KG/M2 | SYSTOLIC BLOOD PRESSURE: 126 MMHG | HEART RATE: 114 BPM | RESPIRATION RATE: 20 BRPM | DIASTOLIC BLOOD PRESSURE: 70 MMHG | HEIGHT: 65 IN | WEIGHT: 130 LBS

## 2020-03-10 DIAGNOSIS — J96.11 CHRONIC RESPIRATORY FAILURE WITH HYPOXIA (HCC): ICD-10-CM

## 2020-03-10 DIAGNOSIS — Z79.01 CHRONIC ANTICOAGULATION: ICD-10-CM

## 2020-03-10 DIAGNOSIS — I27.21 PAH (PULMONARY ARTERY HYPERTENSION) (HCC): ICD-10-CM

## 2020-03-10 DIAGNOSIS — I48.0 PAROXYSMAL ATRIAL FIBRILLATION (HCC): ICD-10-CM

## 2020-03-10 DIAGNOSIS — J43.2 CENTRILOBULAR EMPHYSEMA (HCC): ICD-10-CM

## 2020-03-10 PROCEDURE — 99214 OFFICE O/P EST MOD 30 MIN: CPT | Performed by: PHYSICIAN ASSISTANT

## 2020-03-10 PROCEDURE — 93000 ELECTROCARDIOGRAM COMPLETE: CPT | Performed by: INTERNAL MEDICINE

## 2020-03-10 ASSESSMENT — ENCOUNTER SYMPTOMS
BLOOD IN STOOL: 0
COUGH: 1
DIZZINESS: 0
CHILLS: 0
ORTHOPNEA: 0
SPUTUM PRODUCTION: 1
FEVER: 0
PND: 0
FALLS: 1
PALPITATIONS: 0

## 2020-03-10 ASSESSMENT — FIBROSIS 4 INDEX: FIB4 SCORE: 1.14

## 2020-03-10 NOTE — PROGRESS NOTES
Chief Complaint   Patient presents with   • Pulmonary Hypertension       Subjective:   Garrett Alvarez is a 85 y.o. male who presents today for evaluation of new symptoms.    Last seen by Dr. Sr in January.  Current medical problems include COPD/emphysema, pulmonary hypertension supplemental oxygen, history of NSTEMI medically managed with dual antiplatelet therapy.    He comes in today to discuss new symptoms of chest pain.  He is getting over a viral upper respiratory infection for which she had a course of steroids, antibiotics and mucolytics.  He has been coughing very frequently and the other night when he was coughing he felt some discomfort in his left chest, and it only occurred when he was coughing and now that his cough is better he is not having any more chest pain.  No orthopnea, leg swelling.    He can walk from the car to the elevators (with O2) without stopping to rest. He can walk around the house with resting but has minimal activity at home, mostly sits in his chair.     He denies any heart racing, palpitations or dizziness.  No prior history of atrial fibrillation.  No history of GI bleeding or intracranial bleeding.  He has fallen at home once or twice before.    Past Medical History:   Diagnosis Date   • Centrilobular emphysema (HCC) 12/3/2015   • COPD (chronic obstructive pulmonary disease) (HCC)    • Dyslipidemia    • Hypertension    • Pulmonary emphysema (HCC)    • Pulmonary hypertension (HCC)      Past Surgical History:   Procedure Laterality Date   • HERNIA REPAIR  1990    right inguinal hernia      Family History   Problem Relation Age of Onset   • Heart Disease Father    • Heart Attack Brother    • Other Brother         CAD   • Cancer Brother         lung, other     Social History     Socioeconomic History   • Marital status:      Spouse name: Not on file   • Number of children: Not on file   • Years of education: Not on file   • Highest education level: Not on file    Occupational History   • Not on file   Social Needs   • Financial resource strain: Not on file   • Food insecurity     Worry: Not on file     Inability: Not on file   • Transportation needs     Medical: Not on file     Non-medical: Not on file   Tobacco Use   • Smoking status: Former Smoker     Packs/day: 1.00     Years: 54.00     Pack years: 54.00     Start date:      Last attempt to quit: 2008     Years since quittin.1   • Smokeless tobacco: Never Used   • Tobacco comment: Continued abstinence advised   Substance and Sexual Activity   • Alcohol use: Yes     Alcohol/week: 1.0 oz     Types: 2 Cans of beer per week     Comment: week   • Drug use: No   • Sexual activity: Yes     Partners: Female   Lifestyle   • Physical activity     Days per week: Not on file     Minutes per session: Not on file   • Stress: Not on file   Relationships   • Social connections     Talks on phone: Not on file     Gets together: Not on file     Attends Faith service: Not on file     Active member of club or organization: Not on file     Attends meetings of clubs or organizations: Not on file     Relationship status: Not on file   • Intimate partner violence     Fear of current or ex partner: Not on file     Emotionally abused: Not on file     Physically abused: Not on file     Forced sexual activity: Not on file   Other Topics Concern   • Not on file   Social History Narrative   • Not on file     No Known Allergies  Outpatient Encounter Medications as of 3/10/2020   Medication Sig Dispense Refill   • apixaban (ELIQUIS) 2.5mg Tab Take 1 Tab by mouth 2 Times a Day. 60 Tab 11   • metoprolol (LOPRESSOR) 25 MG Tab Take 1 Tab by mouth 2 times a day. 60 Tab 3   • atorvastatin (LIPITOR) 10 MG Tab Take 1 Tab by mouth every day. 100 Tab 3   • Fluticasone-Umeclidin-Vilant (TRELEGY ELLIPTA) 100-62.5-25 MCG/INH AEROSOL POWDER, BREATH ACTIVATED Inhale 1 Puff by mouth every day. Rinse mouth after use 1 Each 11   • non-formulary med  "Inhale 2.5 L/min by mouth Continuous.     • albuterol 108 (90 Base) MCG/ACT Aero Soln inhalation aerosol Inhale 2 Puffs by mouth every 6 hours as needed for Shortness of Breath.     • lisinopril (PRINIVIL) 20 MG Tab Take 1 Tab by mouth 2 times a day. 180 Tab 3   • tamsulosin (FLOMAX) 0.4 MG capsule Take 1 Cap by mouth every bedtime. 90 Cap 3   • [DISCONTINUED] methylPREDNISolone (MEDROL DOSEPAK) 4 MG Tablet Therapy Pack Take as directed. (Patient not taking: Reported on 3/10/2020) 21 Tab 0   • [DISCONTINUED] Fluticasone-Umeclidin-Vilant (TRELEGY ELLIPTA) 100-62.5-25 MCG/INH AEROSOL POWDER, BREATH ACTIVATED Inhale 1 Puff by mouth every day. Rinse mouth after use. Sample please (Patient not taking: Reported on 3/10/2020) 2 Each 0   • [DISCONTINUED] TRELEGY ELLIPTA 100-62.5-25 MCG/INH AEROSOL POWDER, BREATH ACTIVATED INHALE 1 PUFF BY MOUTH ONCE DAILY RINSE MOUTH AFTER USE (Patient not taking: Reported on 3/10/2020) 1 Each 1   • [DISCONTINUED] aspirin EC 81 MG EC tablet Take 1 Tab by mouth every day. 30 Tab 0     No facility-administered encounter medications on file as of 3/10/2020.      Review of Systems   Constitutional: Positive for malaise/fatigue. Negative for chills and fever.   Respiratory: Positive for cough and sputum production.    Cardiovascular: Positive for chest pain. Negative for palpitations, orthopnea, leg swelling and PND.   Gastrointestinal: Negative for blood in stool and melena.   Genitourinary: Negative for hematuria.   Musculoskeletal: Positive for falls.   Neurological: Negative for dizziness.        Objective:   /70 (BP Location: Left arm, Patient Position: Sitting, BP Cuff Size: Adult)   Pulse (!) 114   Resp 20   Ht 1.651 m (5' 5\")   Wt 59 kg (130 lb)   SpO2 96%   BMI 21.63 kg/m²     Physical Exam   Constitutional: He is oriented to person, place, and time. No distress.   Frail, elderly male   HENT:   Head: Normocephalic and atraumatic.   Neck: No JVD present.   Cardiovascular: An " irregularly irregular rhythm present. Tachycardia present.   Pulmonary/Chest: No respiratory distress.   Diminished lung sounds with wheezes in left upper lobe. O2 6L in office, 3-4L at home   Abdominal: Soft. There is no abdominal tenderness.   Musculoskeletal:         General: No edema.   Neurological: He is oriented to person, place, and time.   Skin: Skin is warm and dry.   Psychiatric: Judgment normal.   Vitals reviewed.       pulmonary function testing 9/13/2018    severe reduction in FEV1, severely reduced FEV1/FVC ratio, 9% improvement in FEV1 postbronchodilator, hyperinflation, severely reduced DLCO, increased airway resistance.  Per pulmonologist interpretation severe obstructive pulmonary disease with partial reversibility, reduction DLCO compatible with clinical diagnosis of emphysema.    TTE 10/31/19  CONCLUSIONS  Compared to the images of the prior study done 06-, the severity   of mitral regurgitation appears to have worsened.  Normal left ventricular systolic function.   Moderate mitral regurgitation.    Left Ventricle  Normal left ventricular chamber size. Normal left ventricular wall   thickness. Normal left ventricular systolic function. Left ventricular   ejection fraction is visually estimated to be 55%. Normal regional wall   motion. Indeterminate diastolic function.     Right Ventricle  Normal right ventricular size and systolic function.     Right Atrium  Enlarged right atrium. Normal inferior vena cava size and inspiratory   collapse.     Left Atrium  Moderately dilated left atrium. Left atrial volume index is  48 mL/sq   m.     Mitral Valve  Mitral annular calcification. No mitral stenosis. Moderate mitral   regurgitation. ERO by PISA method is 0.2 sq cm.     Aortic Valve  No aortic stenosis based on mean gradient. Trace aortic insufficiency.     Tricuspid Valve  Structurally normal tricuspid valve. No tricuspid stenosis. Moderate   tricuspid regurgitation. Right atrial pressure is  estimated to be 3   mmHg. Estimated right ventricular systolic pressure  is 60 mmHg.     Pulmonic Valve  Structurally normal pulmonic valve. No pulmonic stenosis. Trace   pulmonic insufficiency.     Pericardium  No pericardial effusion seen. Fat pad present.     Aorta  Normal aortic root for body surface area. Ascending aorta diameter is   3.3 cm.  Assessment:     1. Paroxysmal atrial fibrillation (HCC)  Holter Monitor Study   2. Chronic anticoagulation  CBC WITHOUT DIFFERENTIAL   3. PAH (pulmonary artery hypertension) (HCC)     4. Centrilobular emphysema (HCC)     5. Chronic respiratory failure with hypoxia (HCC)         Medical Decision Making:  Today's Assessment / Status / Plan:   Atrial Fibrillation, paroxysmal, new diagnosis  -Never been diagnosed with atrial fibrillation before.  Explained to him and his family that is common to have atrial arrhythmias with severe lung disease.  It is likely he has been in this for some time and he may benefit from rate control as opposed to rhythm control given he is largely asymptomatic.  We discussed the risks and benefits of starting an anticoagulant and they agreed they would like to try.  [id number]: 3  - Start Eliquis at reduced dose due to age and weight. Stop aspirin  -CBC before next appointment to check for any occult anemia  -Start metoprolol 25 mg twice daily for rate control.  Obtain 24-hour Holter to monitor rates    Chest pain  -Most likely his chest pain is costochondritis from many weeks of coughing, fortunately he has not had any recurrence for several days now  -Continue Mucinex as recommended per pulmonary    Moderate mitral regurgitation  -Annual echocardiogram, due next fall     PAH   - most likely 2/2 to lung disease  - euvolemic     Mixed hyperlipidemia, controlled  - Continue statin     Hypertension, controlled  - check BP at home with starting metoprolol   - taking lisinopril 20mg once daily     Emphysema, severe  Chronic hypoxic respiratory  failure   - follows with pulm med  - consider pulm rehab?     Coronary artery disease  -  NSTEMI 2018: Noninvasive management  - stop aspirin due to addition of apixaban.   -  continue atorvastatin    Follow up in 2 weeks, sooner if problems.     Collaborating MD: Dr. Kelley

## 2020-03-10 NOTE — Clinical Note
NATALIE. Came in for what sounded like costochondritis but I incidentally found afib. I started him on eliquis 2.5 and will go for rate control since he's largely asymptomatic and has horrible lung disease. Let me know if you want anything else. Thanks

## 2020-03-10 NOTE — PATIENT INSTRUCTIONS
You are in a heart rhythm called Atrial Fibrillation, this is common is people who have COPD/Emphysema.     To reduce your risk of stroke, start taking Eliquis 2.5mg twice daily. If you have any bleeding, call our office    To lower your heart rate, start taking Metoprolol 25mg twice daily. Monitor your blood pressure for a few days after starting this medication.     STOP taking Aspirin

## 2020-03-11 LAB — EKG IMPRESSION: NORMAL

## 2020-03-13 ENCOUNTER — NON-PROVIDER VISIT (OUTPATIENT)
Dept: CARDIOLOGY | Facility: MEDICAL CENTER | Age: 85
End: 2020-03-13
Payer: MEDICARE

## 2020-03-13 DIAGNOSIS — I48.0 PAROXYSMAL ATRIAL FIBRILLATION (HCC): ICD-10-CM

## 2020-03-13 DIAGNOSIS — R00.1 SINUS BRADYCARDIA: ICD-10-CM

## 2020-03-13 PROCEDURE — 93224 XTRNL ECG REC UP TO 48 HRS: CPT | Performed by: INTERNAL MEDICINE

## 2020-03-18 LAB — EKG IMPRESSION: NORMAL

## 2020-03-19 ENCOUNTER — TELEPHONE (OUTPATIENT)
Dept: CARDIOLOGY | Facility: MEDICAL CENTER | Age: 85
End: 2020-03-19

## 2020-03-19 NOTE — TELEPHONE ENCOUNTER
Try to get of hold of patient regarding labs. Phone number we have its wrong does not belong to patient.

## 2020-03-24 ASSESSMENT — ENCOUNTER SYMPTOMS
BLOOD IN STOOL: 0
PALPITATIONS: 0

## 2020-03-24 NOTE — PROGRESS NOTES
Chief Complaint   Patient presents with   • Atrial Fibrillation     2 WK F/V DX: PAF   • Telephone Visit       Subjective:   Garrett Alvarez is a 85 y.o. male who presents today for follow up. Appointment took place via telephone.     Last seen by Dr. rS in January.  Current medical problems include COPD/emphysema, pulmonary hypertension supplemental oxygen, history of NSTEMI medically managed with dual antiplatelet therapy.  Last time I saw him I diagnosed him with atrial fibrillation and started him on Eliquis 2.5 mg.  Holter monitor showed minimal A. fib within 24 hours, about 4%, average heart rates 60s to 70s lowest heart rate 47, no uncontrolled RVR.    Since our last visit he has had no more coughing or chest pressure. No palpitations or dizziness.     He is taking the Eliquis and metoprolol without problems.  No dizziness or lightheadedness, no bright red blood or dark tarry stool.     He doesn't have blood pressure cuff at home. No lightheadedness or increased fatigue with the addition of metoprolol.     He is staying in the house right now, avoiding risk of exposure to coronavirus.    Past Medical History:   Diagnosis Date   • Centrilobular emphysema (HCC) 12/3/2015   • COPD (chronic obstructive pulmonary disease) (HCC)    • Dyslipidemia    • Hypertension    • Pulmonary emphysema (HCC)    • Pulmonary hypertension (HCC)      Past Surgical History:   Procedure Laterality Date   • HERNIA REPAIR  1990    right inguinal hernia      Family History   Problem Relation Age of Onset   • Heart Disease Father    • Heart Attack Brother    • Other Brother         CAD   • Cancer Brother         lung, other     Social History     Socioeconomic History   • Marital status:      Spouse name: Not on file   • Number of children: Not on file   • Years of education: Not on file   • Highest education level: Not on file   Occupational History   • Not on file   Social Needs   • Financial resource strain: Not on file    • Food insecurity     Worry: Not on file     Inability: Not on file   • Transportation needs     Medical: Not on file     Non-medical: Not on file   Tobacco Use   • Smoking status: Former Smoker     Packs/day: 1.00     Years: 54.00     Pack years: 54.00     Start date:      Last attempt to quit: 2008     Years since quittin.2   • Smokeless tobacco: Never Used   • Tobacco comment: Continued abstinence advised   Substance and Sexual Activity   • Alcohol use: Yes     Alcohol/week: 1.0 oz     Types: 2 Cans of beer per week     Comment: week   • Drug use: No   • Sexual activity: Yes     Partners: Female   Lifestyle   • Physical activity     Days per week: Not on file     Minutes per session: Not on file   • Stress: Not on file   Relationships   • Social connections     Talks on phone: Not on file     Gets together: Not on file     Attends Religion service: Not on file     Active member of club or organization: Not on file     Attends meetings of clubs or organizations: Not on file     Relationship status: Not on file   • Intimate partner violence     Fear of current or ex partner: Not on file     Emotionally abused: Not on file     Physically abused: Not on file     Forced sexual activity: Not on file   Other Topics Concern   • Not on file   Social History Narrative   • Not on file     No Known Allergies  Outpatient Encounter Medications as of 3/25/2020   Medication Sig Dispense Refill   • lisinopril (PRINIVIL) 20 MG Tab Take 1 Tab by mouth every day. 90 Tab 3   • apixaban (ELIQUIS) 2.5mg Tab Take 1 Tab by mouth 2 Times a Day. 60 Tab 11   • metoprolol (LOPRESSOR) 25 MG Tab Take 1 Tab by mouth 2 times a day. 60 Tab 3   • atorvastatin (LIPITOR) 10 MG Tab Take 1 Tab by mouth every day. 100 Tab 3   • Fluticasone-Umeclidin-Vilant (TRELEGY ELLIPTA) 100-62.5-25 MCG/INH AEROSOL POWDER, BREATH ACTIVATED Inhale 1 Puff by mouth every day. Rinse mouth after use 1 Each 11   • non-formulary med Inhale 2.5 L/min by  "mouth Continuous.     • albuterol 108 (90 Base) MCG/ACT Aero Soln inhalation aerosol Inhale 2 Puffs by mouth every 6 hours as needed for Shortness of Breath.     • tamsulosin (FLOMAX) 0.4 MG capsule Take 1 Cap by mouth every bedtime. 90 Cap 3   • [DISCONTINUED] lisinopril (PRINIVIL) 20 MG Tab Take 1 Tab by mouth 2 times a day. 180 Tab 3     No facility-administered encounter medications on file as of 3/25/2020.      Review of Systems   Constitutional: Negative for malaise/fatigue.   Respiratory: Positive for shortness of breath (Baseline).    Cardiovascular: Negative for chest pain, palpitations and leg swelling.   Gastrointestinal: Negative for blood in stool and melena.   Musculoskeletal: Negative for falls.   Neurological: Negative for dizziness (No lightheadedness).   Endo/Heme/Allergies: Does not bruise/bleed easily.        Objective:   Ht 1.651 m (5' 5\")   Wt 59.4 kg (131 lb)   BMI 21.80 kg/m²     Physical Exam   Physical exam deferred due to telephone visit    pulmonary function testing 9/13/2018    severe reduction in FEV1, severely reduced FEV1/FVC ratio, 9% improvement in FEV1 postbronchodilator, hyperinflation, severely reduced DLCO, increased airway resistance.  Per pulmonologist interpretation severe obstructive pulmonary disease with partial reversibility, reduction DLCO compatible with clinical diagnosis of emphysema.    TTE 10/31/19  CONCLUSIONS  Compared to the images of the prior study done 06-, the severity   of mitral regurgitation appears to have worsened.  Normal left ventricular systolic function.   Moderate mitral regurgitation.    Left Ventricle  Normal left ventricular chamber size. Normal left ventricular wall   thickness. Normal left ventricular systolic function. Left ventricular   ejection fraction is visually estimated to be 55%. Normal regional wall   motion. Indeterminate diastolic function.     Right Ventricle  Normal right ventricular size and systolic function.     Right " Atrium  Enlarged right atrium. Normal inferior vena cava size and inspiratory   collapse.     Left Atrium  Moderately dilated left atrium. Left atrial volume index is  48 mL/sq   m.     Mitral Valve  Mitral annular calcification. No mitral stenosis. Moderate mitral   regurgitation. ERO by PISA method is 0.2 sq cm.     Aortic Valve  No aortic stenosis based on mean gradient. Trace aortic insufficiency.     Tricuspid Valve  Structurally normal tricuspid valve. No tricuspid stenosis. Moderate   tricuspid regurgitation. Right atrial pressure is estimated to be 3   mmHg. Estimated right ventricular systolic pressure  is 60 mmHg.     Pulmonic Valve  Structurally normal pulmonic valve. No pulmonic stenosis. Trace   pulmonic insufficiency.     Pericardium  No pericardial effusion seen. Fat pad present.     Aorta  Normal aortic root for body surface area. Ascending aorta diameter is   3.3 cm.    24-hour Holter 3/13/2020  SUMMARY   normal sinus rhythm   Paroxyimal atrial fibrillation accounting for 4% of monitored time   occasional atrial ectopy   rare ventricular ectopy   no symptoms reported   Assessment:     1. Paroxysmal atrial fibrillation (HCC)     2. Sinus bradycardia     3. Chronic anticoagulation     4. PAH (pulmonary artery hypertension) (HCC)     5. Centrilobular emphysema (HCC)     6. Chronic respiratory failure with hypoxia (HCC)     7. Mixed hyperlipidemia     8. Essential hypertension  lisinopril (PRINIVIL) 20 MG Tab   9. NSTEMI (non-ST elevated myocardial infarction) ([facility])         Medical Decision Making:  Today's Assessment / Status / Plan:   Atrial Fibrillation, paroxysmal  Chronic anticoagulation  -  [id number]: 4  -Holter monitor shows minimal burden with well-controlled rates, continue metoprolol 25 mg twice daily although this can be discontinued if he has any trouble with dizziness or lightheadedness.  -Continue Eliquis 2.5 mg, will mail coupon card.  He has stopped aspirin and clopidogrel  -Check CBC  for occult anemia    Chest pain, resolved    Moderate mitral regurgitation  -Annual echocardiogram, due next fall     PAH   - most likely 2/2 to lung disease  - euvolemic     Mixed hyperlipidemia, controlled  - Continue statin     Hypertension, controlled  - check BP at home few times a week, obtain BP cuff  - taking lisinopril 20mg once daily     Emphysema, severe  Chronic hypoxic respiratory failure   - follows with pulm med  - consider pulm rehab?     Coronary artery disease  -  NSTEMI 2018: Noninvasive management  - stop aspirin due to addition of apixaban.   -  continue atorvastatin    End of life planning  - he does not have an advanced directive but states he is DNR/DNI    Follow up in 6 mo, sooner if problems.    Telephone visit lasted 16min    Collaborating MD: Dr. Love

## 2020-03-25 ENCOUNTER — OFFICE VISIT (OUTPATIENT)
Dept: CARDIOLOGY | Facility: MEDICAL CENTER | Age: 85
End: 2020-03-25
Payer: MEDICARE

## 2020-03-25 VITALS — BODY MASS INDEX: 21.83 KG/M2 | WEIGHT: 131 LBS | HEIGHT: 65 IN

## 2020-03-25 DIAGNOSIS — I48.0 PAROXYSMAL ATRIAL FIBRILLATION (HCC): ICD-10-CM

## 2020-03-25 DIAGNOSIS — E78.2 MIXED HYPERLIPIDEMIA: ICD-10-CM

## 2020-03-25 DIAGNOSIS — I10 ESSENTIAL HYPERTENSION: ICD-10-CM

## 2020-03-25 DIAGNOSIS — I27.21 PAH (PULMONARY ARTERY HYPERTENSION) (HCC): ICD-10-CM

## 2020-03-25 DIAGNOSIS — R00.1 SINUS BRADYCARDIA: ICD-10-CM

## 2020-03-25 DIAGNOSIS — Z79.01 CHRONIC ANTICOAGULATION: ICD-10-CM

## 2020-03-25 DIAGNOSIS — I21.4 NSTEMI (NON-ST ELEVATED MYOCARDIAL INFARCTION) (HCC): ICD-10-CM

## 2020-03-25 DIAGNOSIS — J96.11 CHRONIC RESPIRATORY FAILURE WITH HYPOXIA (HCC): ICD-10-CM

## 2020-03-25 DIAGNOSIS — J43.2 CENTRILOBULAR EMPHYSEMA (HCC): ICD-10-CM

## 2020-03-25 PROCEDURE — 99442 PR PHYSICIAN TELEPHONE EVALUATION 11-20 MIN: CPT | Mod: CR | Performed by: PHYSICIAN ASSISTANT

## 2020-03-25 RX ORDER — LISINOPRIL 20 MG/1
20 TABLET ORAL DAILY
Qty: 90 TAB | Refills: 3
Start: 2020-03-25 | End: 2020-04-09

## 2020-03-25 ASSESSMENT — ENCOUNTER SYMPTOMS
DIZZINESS: 0
SHORTNESS OF BREATH: 1
BRUISES/BLEEDS EASILY: 0
FALLS: 0

## 2020-03-25 ASSESSMENT — FIBROSIS 4 INDEX: FIB4 SCORE: 1.14

## 2020-04-09 ENCOUNTER — APPOINTMENT (OUTPATIENT)
Dept: RADIOLOGY | Facility: MEDICAL CENTER | Age: 85
DRG: 193 | End: 2020-04-09
Attending: EMERGENCY MEDICINE
Payer: MEDICARE

## 2020-04-09 ENCOUNTER — HOSPITAL ENCOUNTER (INPATIENT)
Facility: MEDICAL CENTER | Age: 85
LOS: 2 days | DRG: 193 | End: 2020-04-11
Attending: EMERGENCY MEDICINE | Admitting: HOSPITALIST
Payer: MEDICARE

## 2020-04-09 DIAGNOSIS — J18.9 PNEUMONIA OF RIGHT LOWER LOBE DUE TO INFECTIOUS ORGANISM: ICD-10-CM

## 2020-04-09 DIAGNOSIS — M25.531 WRIST PAIN, ACUTE, RIGHT: ICD-10-CM

## 2020-04-09 DIAGNOSIS — A41.9 SEPSIS WITHOUT ACUTE ORGAN DYSFUNCTION, DUE TO UNSPECIFIED ORGANISM (HCC): ICD-10-CM

## 2020-04-09 DIAGNOSIS — Z20.822 SUSPECTED COVID-19 VIRUS INFECTION: ICD-10-CM

## 2020-04-09 DIAGNOSIS — R53.1 WEAKNESS: ICD-10-CM

## 2020-04-09 PROBLEM — E83.42 HYPOMAGNESEMIA: Status: ACTIVE | Noted: 2020-04-09

## 2020-04-09 PROBLEM — J96.21 ACUTE ON CHRONIC RESPIRATORY FAILURE WITH HYPOXIA (HCC): Status: ACTIVE | Noted: 2020-02-21

## 2020-04-09 PROBLEM — I48.91 ATRIAL FIBRILLATION (HCC): Status: ACTIVE | Noted: 2020-04-09

## 2020-04-09 LAB
ALBUMIN SERPL BCP-MCNC: 3.4 G/DL (ref 3.2–4.9)
ALBUMIN/GLOB SERPL: 1 G/DL
ALP SERPL-CCNC: 72 U/L (ref 30–99)
ALT SERPL-CCNC: 13 U/L (ref 2–50)
ANION GAP SERPL CALC-SCNC: 10 MMOL/L (ref 7–16)
APPEARANCE UR: CLEAR
AST SERPL-CCNC: 16 U/L (ref 12–45)
BACTERIA #/AREA URNS HPF: ABNORMAL /HPF
BASOPHILS # BLD AUTO: 0.6 % (ref 0–1.8)
BASOPHILS # BLD: 0.05 K/UL (ref 0–0.12)
BILIRUB SERPL-MCNC: 0.6 MG/DL (ref 0.1–1.5)
BILIRUB UR QL STRIP.AUTO: NEGATIVE
BLOOD CULTURE HOLD CXBCH: NORMAL
BUN SERPL-MCNC: 20 MG/DL (ref 8–22)
CALCIUM SERPL-MCNC: 8.8 MG/DL (ref 8.5–10.5)
CHLORIDE SERPL-SCNC: 94 MMOL/L (ref 96–112)
CO2 SERPL-SCNC: 26 MMOL/L (ref 20–33)
COLOR UR: YELLOW
COVID ORDER STATUS COVID19: NORMAL
CREAT SERPL-MCNC: 0.58 MG/DL (ref 0.5–1.4)
CRP SERPL HS-MCNC: 0.5 MG/DL (ref 0–0.75)
D DIMER PPP IA.FEU-MCNC: 3.19 UG/ML (FEU) (ref 0–0.5)
EOSINOPHIL # BLD AUTO: 0.14 K/UL (ref 0–0.51)
EOSINOPHIL NFR BLD: 1.7 % (ref 0–6.9)
EPI CELLS #/AREA URNS HPF: NEGATIVE /HPF
ERYTHROCYTE [DISTWIDTH] IN BLOOD BY AUTOMATED COUNT: 52.4 FL (ref 35.9–50)
ERYTHROCYTE [SEDIMENTATION RATE] IN BLOOD BY WESTERGREN METHOD: 25 MM/HOUR (ref 0–20)
FERRITIN SERPL-MCNC: 619 NG/ML (ref 22–322)
GLOBULIN SER CALC-MCNC: 3.3 G/DL (ref 1.9–3.5)
GLUCOSE SERPL-MCNC: 118 MG/DL (ref 65–99)
GLUCOSE UR STRIP.AUTO-MCNC: NEGATIVE MG/DL
HCT VFR BLD AUTO: 36.3 % (ref 42–52)
HGB BLD-MCNC: 11.4 G/DL (ref 14–18)
HYALINE CASTS #/AREA URNS LPF: ABNORMAL /LPF
IMM GRANULOCYTES # BLD AUTO: 0.03 K/UL (ref 0–0.11)
IMM GRANULOCYTES NFR BLD AUTO: 0.4 % (ref 0–0.9)
KETONES UR STRIP.AUTO-MCNC: NEGATIVE MG/DL
LACTATE BLD-SCNC: 0.8 MMOL/L (ref 0.5–2)
LACTATE BLD-SCNC: 2.5 MMOL/L (ref 0.5–2)
LDH SERPL L TO P-CCNC: 382 U/L (ref 107–266)
LEUKOCYTE ESTERASE UR QL STRIP.AUTO: ABNORMAL
LYMPHOCYTES # BLD AUTO: 1.07 K/UL (ref 1–4.8)
LYMPHOCYTES NFR BLD: 12.8 % (ref 22–41)
MAGNESIUM SERPL-MCNC: 1.7 MG/DL (ref 1.5–2.5)
MCH RBC QN AUTO: 29.8 PG (ref 27–33)
MCHC RBC AUTO-ENTMCNC: 31.4 G/DL (ref 33.7–35.3)
MCV RBC AUTO: 95 FL (ref 81.4–97.8)
MICRO URNS: ABNORMAL
MONOCYTES # BLD AUTO: 1.01 K/UL (ref 0–0.85)
MONOCYTES NFR BLD AUTO: 12.1 % (ref 0–13.4)
NEUTROPHILS # BLD AUTO: 6.03 K/UL (ref 1.82–7.42)
NEUTROPHILS NFR BLD: 72.4 % (ref 44–72)
NITRITE UR QL STRIP.AUTO: POSITIVE
NRBC # BLD AUTO: 0 K/UL
NRBC BLD-RTO: 0 /100 WBC
NT-PROBNP SERPL IA-MCNC: 1627 PG/ML (ref 0–125)
PH UR STRIP.AUTO: 6.5 [PH] (ref 5–8)
PHOSPHATE SERPL-MCNC: 2.7 MG/DL (ref 2.5–4.5)
PLATELET # BLD AUTO: 301 K/UL (ref 164–446)
PMV BLD AUTO: 9.1 FL (ref 9–12.9)
POTASSIUM SERPL-SCNC: 4 MMOL/L (ref 3.6–5.5)
PROCALCITONIN SERPL-MCNC: <0.05 NG/ML
PROT SERPL-MCNC: 6.7 G/DL (ref 6–8.2)
PROT UR QL STRIP: NEGATIVE MG/DL
RBC # BLD AUTO: 3.82 M/UL (ref 4.7–6.1)
RBC # URNS HPF: ABNORMAL /HPF
RBC UR QL AUTO: ABNORMAL
SODIUM SERPL-SCNC: 130 MMOL/L (ref 135–145)
SP GR UR STRIP.AUTO: 1.01
TROPONIN T SERPL-MCNC: 31 NG/L (ref 6–19)
UROBILINOGEN UR STRIP.AUTO-MCNC: 0.2 MG/DL
WBC # BLD AUTO: 8.3 K/UL (ref 4.8–10.8)
WBC #/AREA URNS HPF: ABNORMAL /HPF

## 2020-04-09 PROCEDURE — 84100 ASSAY OF PHOSPHORUS: CPT

## 2020-04-09 PROCEDURE — 770001 HCHG ROOM/CARE - MED/SURG/GYN PRIV*

## 2020-04-09 PROCEDURE — 700102 HCHG RX REV CODE 250 W/ 637 OVERRIDE(OP): Performed by: HOSPITALIST

## 2020-04-09 PROCEDURE — 99285 EMERGENCY DEPT VISIT HI MDM: CPT

## 2020-04-09 PROCEDURE — 94760 N-INVAS EAR/PLS OXIMETRY 1: CPT

## 2020-04-09 PROCEDURE — 83605 ASSAY OF LACTIC ACID: CPT

## 2020-04-09 PROCEDURE — 700111 HCHG RX REV CODE 636 W/ 250 OVERRIDE (IP): Performed by: EMERGENCY MEDICINE

## 2020-04-09 PROCEDURE — 83735 ASSAY OF MAGNESIUM: CPT

## 2020-04-09 PROCEDURE — 85025 COMPLETE CBC W/AUTO DIFF WBC: CPT

## 2020-04-09 PROCEDURE — 82728 ASSAY OF FERRITIN: CPT

## 2020-04-09 PROCEDURE — 86140 C-REACTIVE PROTEIN: CPT

## 2020-04-09 PROCEDURE — 36415 COLL VENOUS BLD VENIPUNCTURE: CPT

## 2020-04-09 PROCEDURE — 71045 X-RAY EXAM CHEST 1 VIEW: CPT

## 2020-04-09 PROCEDURE — 700111 HCHG RX REV CODE 636 W/ 250 OVERRIDE (IP): Performed by: HOSPITALIST

## 2020-04-09 PROCEDURE — 99223 1ST HOSP IP/OBS HIGH 75: CPT | Mod: AI | Performed by: HOSPITALIST

## 2020-04-09 PROCEDURE — 85652 RBC SED RATE AUTOMATED: CPT

## 2020-04-09 PROCEDURE — A9270 NON-COVERED ITEM OR SERVICE: HCPCS | Performed by: HOSPITALIST

## 2020-04-09 PROCEDURE — 700105 HCHG RX REV CODE 258: Performed by: EMERGENCY MEDICINE

## 2020-04-09 PROCEDURE — 700101 HCHG RX REV CODE 250: Performed by: HOSPITALIST

## 2020-04-09 PROCEDURE — 96365 THER/PROPH/DIAG IV INF INIT: CPT

## 2020-04-09 PROCEDURE — 84484 ASSAY OF TROPONIN QUANT: CPT

## 2020-04-09 PROCEDURE — 85379 FIBRIN DEGRADATION QUANT: CPT

## 2020-04-09 PROCEDURE — 83880 ASSAY OF NATRIURETIC PEPTIDE: CPT

## 2020-04-09 PROCEDURE — 70450 CT HEAD/BRAIN W/O DYE: CPT

## 2020-04-09 PROCEDURE — 84145 PROCALCITONIN (PCT): CPT

## 2020-04-09 PROCEDURE — 93005 ELECTROCARDIOGRAM TRACING: CPT | Performed by: INTERNAL MEDICINE

## 2020-04-09 PROCEDURE — 87040 BLOOD CULTURE FOR BACTERIA: CPT

## 2020-04-09 PROCEDURE — 94640 AIRWAY INHALATION TREATMENT: CPT

## 2020-04-09 PROCEDURE — 80053 COMPREHEN METABOLIC PANEL: CPT

## 2020-04-09 PROCEDURE — 81001 URINALYSIS AUTO W/SCOPE: CPT

## 2020-04-09 PROCEDURE — 73110 X-RAY EXAM OF WRIST: CPT | Mod: RT

## 2020-04-09 PROCEDURE — 83615 LACTATE (LD) (LDH) ENZYME: CPT

## 2020-04-09 RX ORDER — ATORVASTATIN CALCIUM 10 MG/1
10 TABLET, FILM COATED ORAL NIGHTLY
COMMUNITY
End: 2020-05-26

## 2020-04-09 RX ORDER — BISACODYL 10 MG
10 SUPPOSITORY, RECTAL RECTAL
Status: DISCONTINUED | OUTPATIENT
Start: 2020-04-09 | End: 2020-04-10

## 2020-04-09 RX ORDER — LISINOPRIL 20 MG/1
20 TABLET ORAL 2 TIMES DAILY
Status: DISCONTINUED | OUTPATIENT
Start: 2020-04-09 | End: 2020-04-10

## 2020-04-09 RX ORDER — ZINC SULFATE 50(220)MG
220 CAPSULE ORAL DAILY
Status: DISCONTINUED | OUTPATIENT
Start: 2020-04-10 | End: 2020-04-11 | Stop reason: HOSPADM

## 2020-04-09 RX ORDER — MAGNESIUM SULFATE HEPTAHYDRATE 40 MG/ML
2 INJECTION, SOLUTION INTRAVENOUS ONCE
Status: COMPLETED | OUTPATIENT
Start: 2020-04-09 | End: 2020-04-09

## 2020-04-09 RX ORDER — BUDESONIDE AND FORMOTEROL FUMARATE DIHYDRATE 160; 4.5 UG/1; UG/1
2 AEROSOL RESPIRATORY (INHALATION)
Status: DISCONTINUED | OUTPATIENT
Start: 2020-04-09 | End: 2020-04-10

## 2020-04-09 RX ORDER — POLYETHYLENE GLYCOL 3350 17 G/17G
1 POWDER, FOR SOLUTION ORAL
Status: DISCONTINUED | OUTPATIENT
Start: 2020-04-09 | End: 2020-04-10

## 2020-04-09 RX ORDER — SODIUM CHLORIDE 9 MG/ML
1000 INJECTION, SOLUTION INTRAVENOUS ONCE
Status: COMPLETED | OUTPATIENT
Start: 2020-04-09 | End: 2020-04-09

## 2020-04-09 RX ORDER — AMOXICILLIN 250 MG
2 CAPSULE ORAL 2 TIMES DAILY
Status: DISCONTINUED | OUTPATIENT
Start: 2020-04-09 | End: 2020-04-10

## 2020-04-09 RX ORDER — TAMSULOSIN HYDROCHLORIDE 0.4 MG/1
0.4 CAPSULE ORAL
Status: DISCONTINUED | OUTPATIENT
Start: 2020-04-09 | End: 2020-04-11 | Stop reason: HOSPADM

## 2020-04-09 RX ORDER — ONDANSETRON 4 MG/1
4 TABLET, ORALLY DISINTEGRATING ORAL EVERY 4 HOURS PRN
Status: DISCONTINUED | OUTPATIENT
Start: 2020-04-09 | End: 2020-04-09

## 2020-04-09 RX ORDER — ONDANSETRON 2 MG/ML
4 INJECTION INTRAMUSCULAR; INTRAVENOUS EVERY 4 HOURS PRN
Status: DISCONTINUED | OUTPATIENT
Start: 2020-04-09 | End: 2020-04-09

## 2020-04-09 RX ORDER — VALACYCLOVIR HYDROCHLORIDE 500 MG/1
1000 TABLET, FILM COATED ORAL DAILY
Status: DISCONTINUED | OUTPATIENT
Start: 2020-04-10 | End: 2020-04-11 | Stop reason: HOSPADM

## 2020-04-09 RX ORDER — ONDANSETRON 4 MG/1
4 TABLET, ORALLY DISINTEGRATING ORAL EVERY 6 HOURS PRN
Status: DISCONTINUED | OUTPATIENT
Start: 2020-04-09 | End: 2020-04-11 | Stop reason: HOSPADM

## 2020-04-09 RX ORDER — ACETAMINOPHEN 325 MG/1
650 TABLET ORAL EVERY 6 HOURS PRN
Status: DISCONTINUED | OUTPATIENT
Start: 2020-04-09 | End: 2020-04-11 | Stop reason: HOSPADM

## 2020-04-09 RX ORDER — ASCORBIC ACID 500 MG
500 TABLET ORAL DAILY
Status: DISCONTINUED | OUTPATIENT
Start: 2020-04-10 | End: 2020-04-11 | Stop reason: HOSPADM

## 2020-04-09 RX ORDER — LISINOPRIL 20 MG/1
20 TABLET ORAL 2 TIMES DAILY
Status: SHIPPED | COMMUNITY
End: 2022-08-07

## 2020-04-09 RX ORDER — METOPROLOL TARTRATE 1 MG/ML
5 INJECTION, SOLUTION INTRAVENOUS EVERY 6 HOURS PRN
Status: DISCONTINUED | OUTPATIENT
Start: 2020-04-09 | End: 2020-04-11 | Stop reason: HOSPADM

## 2020-04-09 RX ORDER — VALACYCLOVIR HYDROCHLORIDE 1 G/1
1000 TABLET, FILM COATED ORAL DAILY
COMMUNITY

## 2020-04-09 RX ORDER — ATORVASTATIN CALCIUM 10 MG/1
10 TABLET, FILM COATED ORAL NIGHTLY
Status: DISCONTINUED | OUTPATIENT
Start: 2020-04-09 | End: 2020-04-11 | Stop reason: HOSPADM

## 2020-04-09 RX ORDER — AMOXICILLIN AND CLAVULANATE POTASSIUM 875; 125 MG/1; MG/1
1 TABLET, FILM COATED ORAL EVERY 12 HOURS
Status: DISCONTINUED | OUTPATIENT
Start: 2020-04-09 | End: 2020-04-11 | Stop reason: HOSPADM

## 2020-04-09 RX ORDER — GUAIFENESIN 600 MG/1
600 TABLET, EXTENDED RELEASE ORAL 2 TIMES DAILY PRN
Status: DISCONTINUED | OUTPATIENT
Start: 2020-04-09 | End: 2020-04-11 | Stop reason: HOSPADM

## 2020-04-09 RX ORDER — ONDANSETRON 2 MG/ML
4 INJECTION INTRAMUSCULAR; INTRAVENOUS EVERY 6 HOURS PRN
Status: DISCONTINUED | OUTPATIENT
Start: 2020-04-09 | End: 2020-04-11 | Stop reason: HOSPADM

## 2020-04-09 RX ORDER — HYDRALAZINE HYDROCHLORIDE 20 MG/ML
10 INJECTION INTRAMUSCULAR; INTRAVENOUS EVERY 6 HOURS PRN
Status: DISCONTINUED | OUTPATIENT
Start: 2020-04-09 | End: 2020-04-11 | Stop reason: HOSPADM

## 2020-04-09 RX ORDER — IPRATROPIUM BROMIDE AND ALBUTEROL SULFATE 2.5; .5 MG/3ML; MG/3ML
3 SOLUTION RESPIRATORY (INHALATION)
Status: DISCONTINUED | OUTPATIENT
Start: 2020-04-09 | End: 2020-04-10

## 2020-04-09 RX ORDER — SODIUM CHLORIDE, SODIUM LACTATE, POTASSIUM CHLORIDE, AND CALCIUM CHLORIDE .6; .31; .03; .02 G/100ML; G/100ML; G/100ML; G/100ML
500 INJECTION, SOLUTION INTRAVENOUS ONCE
Status: ACTIVE | OUTPATIENT
Start: 2020-04-09 | End: 2020-04-10

## 2020-04-09 RX ADMIN — AMOXICILLIN AND CLAVULANATE POTASSIUM 1 TABLET: 875; 125 TABLET, FILM COATED ORAL at 18:09

## 2020-04-09 RX ADMIN — AMPICILLIN SODIUM AND SULBACTAM SODIUM 3 G: 2; 1 INJECTION, POWDER, FOR SOLUTION INTRAMUSCULAR; INTRAVENOUS at 14:15

## 2020-04-09 RX ADMIN — ATORVASTATIN CALCIUM 10 MG: 10 TABLET, FILM COATED ORAL at 21:40

## 2020-04-09 RX ADMIN — MAGNESIUM SULFATE 2 G: 2 INJECTION INTRAVENOUS at 21:39

## 2020-04-09 RX ADMIN — METOPROLOL TARTRATE 25 MG: 25 TABLET, FILM COATED ORAL at 18:09

## 2020-04-09 RX ADMIN — LISINOPRIL 20 MG: 20 TABLET ORAL at 18:10

## 2020-04-09 RX ADMIN — IPRATROPIUM BROMIDE AND ALBUTEROL SULFATE 3 ML: .5; 3 SOLUTION RESPIRATORY (INHALATION) at 22:47

## 2020-04-09 RX ADMIN — TAMSULOSIN HYDROCHLORIDE 0.4 MG: 0.4 CAPSULE ORAL at 21:38

## 2020-04-09 RX ADMIN — APIXABAN 2.5 MG: 2.5 TABLET, FILM COATED ORAL at 21:38

## 2020-04-09 RX ADMIN — IPRATROPIUM BROMIDE AND ALBUTEROL SULFATE 3 ML: .5; 3 SOLUTION RESPIRATORY (INHALATION) at 19:33

## 2020-04-09 RX ADMIN — SODIUM CHLORIDE 1000 ML: 9 INJECTION, SOLUTION INTRAVENOUS at 13:45

## 2020-04-09 ASSESSMENT — ENCOUNTER SYMPTOMS
NECK PAIN: 0
SPUTUM PRODUCTION: 0
WEIGHT LOSS: 1
EYE DISCHARGE: 0
WEAKNESS: 1
BACK PAIN: 0
ABDOMINAL PAIN: 0
COUGH: 1
NAUSEA: 0
PALPITATIONS: 0
HEADACHES: 0
FEVER: 0
DIARRHEA: 0
SORE THROAT: 0
DIZZINESS: 0
NERVOUS/ANXIOUS: 0
SHORTNESS OF BREATH: 1
WHEEZING: 0
STRIDOR: 0
FOCAL WEAKNESS: 0
SENSORY CHANGE: 0

## 2020-04-09 ASSESSMENT — CHA2DS2 SCORE
CHF OR LEFT VENTRICULAR DYSFUNCTION: NO
SEX: MALE
VASCULAR DISEASE: YES
HYPERTENSION: YES
AGE 65 TO 74: NO
DIABETES: NO
PRIOR STROKE OR TIA OR THROMBOEMBOLISM: NO
CHA2DS2 VASC SCORE: 4
AGE 75 OR GREATER: YES

## 2020-04-09 ASSESSMENT — LIFESTYLE VARIABLES
HOW MANY TIMES IN THE PAST YEAR HAVE YOU HAD 5 OR MORE DRINKS IN A DAY: 0
ON A TYPICAL DAY WHEN YOU DRINK ALCOHOL HOW MANY DRINKS DO YOU HAVE: 1
HAVE YOU EVER FELT YOU SHOULD CUT DOWN ON YOUR DRINKING: NO
EVER HAD A DRINK FIRST THING IN THE MORNING TO STEADY YOUR NERVES TO GET RID OF A HANGOVER: NO
DOES PATIENT WANT TO STOP DRINKING: CANNOT ASSESS
ALCOHOL_USE: YES
EVER_SMOKED: YES
TOTAL SCORE: 0
AVERAGE NUMBER OF DAYS PER WEEK YOU HAVE A DRINK CONTAINING ALCOHOL: 0
TOTAL SCORE: 0
EVER_SMOKED: YES
TOTAL SCORE: 0
EVER FELT BAD OR GUILTY ABOUT YOUR DRINKING: NO
CONSUMPTION TOTAL: NEGATIVE
HAVE PEOPLE ANNOYED YOU BY CRITICIZING YOUR DRINKING: NO

## 2020-04-09 ASSESSMENT — FIBROSIS 4 INDEX
FIB4 SCORE: 1.14
FIB4 SCORE: 1.25

## 2020-04-09 NOTE — ED NOTES
Med Rec Updated and Complete per Pt's family over the phone and Historical  Allergies Reviewed  No PO ABX last 14 days.    Pt on a maintenance dose of Valtrex.    Pt taking Eliquis 2.5mg twice daily with his last dose 04/09/20 @ AM.    Family denies OTC medication at this time.

## 2020-04-09 NOTE — ED PROVIDER NOTES
ED Provider Note  CHIEF COMPLAINT  Chief Complaint   Patient presents with   • Weakness   • Tremors   • Fall     X 3 in the last 24 hours   • Wrist Pain     Right       HPI  Garrett Alvarez is a 85 y.o. male who presents with weakness.  Patient has had 3 falls in the past 24 hours.  He denies any dizziness or actual syncope.  He states his legs just feel weak and he falls to the ground.  He is also had increasing shaking and tremors of the last 3 days.  He does not have a history of that in the past.  Patient states he has had pain in his right wrist from the fall.  Patient denies any recent illness.  He states about a month ago he was exposed to somebody with a cough and URI symptoms but nothing recently.  Patient has a history of COPD and is on 4 L chronically.  He has not required any increased O2 requirements.  He denies any fevers.  No nausea vomiting or diarrhea.  No chest pain.  No abdominal pain.  No one-sided weakness just feels like both legs cannot support him.  Patient is on Eliquis.    REVIEW OF SYSTEMS  See HPI for further details. All other systems are negative.     PAST MEDICAL HISTORY  Past Medical History:   Diagnosis Date   • Centrilobular emphysema (HCC) 12/3/2015   • COPD (chronic obstructive pulmonary disease) (HCC)    • Dyslipidemia    • Hypertension    • Pulmonary emphysema (HCC)    • Pulmonary hypertension (HCC)        FAMILY HISTORY  [unfilled]    SOCIAL HISTORY  Social History     Socioeconomic History   • Marital status:      Spouse name: Not on file   • Number of children: Not on file   • Years of education: Not on file   • Highest education level: Not on file   Occupational History   • Not on file   Social Needs   • Financial resource strain: Not on file   • Food insecurity     Worry: Not on file     Inability: Not on file   • Transportation needs     Medical: Not on file     Non-medical: Not on file   Tobacco Use   • Smoking status: Former Smoker     Packs/day: 1.00      "Years: 54.00     Pack years: 54.00     Start date:      Last attempt to quit: 2008     Years since quittin.2   • Smokeless tobacco: Never Used   • Tobacco comment: Continued abstinence advised   Substance and Sexual Activity   • Alcohol use: Yes     Alcohol/week: 1.0 oz     Types: 2 Cans of beer per week     Comment: week   • Drug use: No   • Sexual activity: Yes     Partners: Female   Lifestyle   • Physical activity     Days per week: Not on file     Minutes per session: Not on file   • Stress: Not on file   Relationships   • Social connections     Talks on phone: Not on file     Gets together: Not on file     Attends Muslim service: Not on file     Active member of club or organization: Not on file     Attends meetings of clubs or organizations: Not on file     Relationship status: Not on file   • Intimate partner violence     Fear of current or ex partner: Not on file     Emotionally abused: Not on file     Physically abused: Not on file     Forced sexual activity: Not on file   Other Topics Concern   • Not on file   Social History Narrative   • Not on file       SURGICAL HISTORY  Past Surgical History:   Procedure Laterality Date   • HERNIA REPAIR      right inguinal hernia        CURRENT MEDICATIONS  Home Medications    **Home medications have not yet been reviewed for this encounter**         ALLERGIES  No Known Allergies    PHYSICAL EXAM  VITAL SIGNS: /99   Pulse (!) 105   Temp 36.5 °C (97.7 °F) (Temporal)   Resp (!) 24   Ht 1.676 m (5' 6\")   Wt 59.9 kg (132 lb)   SpO2 100%   BMI 21.31 kg/m²       Constitutional:Well developed, No acute distress, Non-toxic appearance.   HENT: Normocephalic, Atraumatic, Bilateral external ears normal, Oropharynx moist   Eyes: PERRL, EOMI, Conjunctiva normal  Neck: Normal range of motion, No tenderness, Supple,   Cardiovascular: Normal heart rate, Normal rhythm  Thorax & Lungs: Normal breath sounds, No respiratory distress, No wheezing, No chest " tenderness.   Abdomen: Benign abdominal exam, no guarding no rebound,  no tenderness, no distention  : Lazar catheter in place  Skin: Warm, Dry, No erythema, No rash.   Back: No tenderness, No CVA tenderness.   Extremities: Intact distal pulses, No edema, right wrist with some minimal tenderness to palpation, no obvious swelling, no deformity, is able to range with minimal pain, no snuffbox tenderness, no other extremity pain to palpation  Neurologic: Alert & oriented x 3, Normal motor function, Normal sensory function, No focal deficits noted.  Mild tremors  Psychiatric: appropriate    Labs  Results for orders placed or performed during the hospital encounter of 04/09/20   CBC WITH DIFFERENTIAL   Result Value Ref Range    WBC 8.3 4.8 - 10.8 K/uL    RBC 3.82 (L) 4.70 - 6.10 M/uL    Hemoglobin 11.4 (L) 14.0 - 18.0 g/dL    Hematocrit 36.3 (L) 42.0 - 52.0 %    MCV 95.0 81.4 - 97.8 fL    MCH 29.8 27.0 - 33.0 pg    MCHC 31.4 (L) 33.7 - 35.3 g/dL    RDW 52.4 (H) 35.9 - 50.0 fL    Platelet Count 301 164 - 446 K/uL    MPV 9.1 9.0 - 12.9 fL    Neutrophils-Polys 72.40 (H) 44.00 - 72.00 %    Lymphocytes 12.80 (L) 22.00 - 41.00 %    Monocytes 12.10 0.00 - 13.40 %    Eosinophils 1.70 0.00 - 6.90 %    Basophils 0.60 0.00 - 1.80 %    Immature Granulocytes 0.40 0.00 - 0.90 %    Nucleated RBC 0.00 /100 WBC    Neutrophils (Absolute) 6.03 1.82 - 7.42 K/uL    Lymphs (Absolute) 1.07 1.00 - 4.80 K/uL    Monos (Absolute) 1.01 (H) 0.00 - 0.85 K/uL    Eos (Absolute) 0.14 0.00 - 0.51 K/uL    Baso (Absolute) 0.05 0.00 - 0.12 K/uL    Immature Granulocytes (abs) 0.03 0.00 - 0.11 K/uL    NRBC (Absolute) 0.00 K/uL   COMP METABOLIC PANEL   Result Value Ref Range    Sodium 130 (L) 135 - 145 mmol/L    Potassium 4.0 3.6 - 5.5 mmol/L    Chloride 94 (L) 96 - 112 mmol/L    Co2 26 20 - 33 mmol/L    Anion Gap 10.0 7.0 - 16.0    Glucose 118 (H) 65 - 99 mg/dL    Bun 20 8 - 22 mg/dL    Creatinine 0.58 0.50 - 1.40 mg/dL    Calcium 8.8 8.5 - 10.5 mg/dL     AST(SGOT) 16 12 - 45 U/L    ALT(SGPT) 13 2 - 50 U/L    Alkaline Phosphatase 72 30 - 99 U/L    Total Bilirubin 0.6 0.1 - 1.5 mg/dL    Albumin 3.4 3.2 - 4.9 g/dL    Total Protein 6.7 6.0 - 8.2 g/dL    Globulin 3.3 1.9 - 3.5 g/dL    A-G Ratio 1.0 g/dL   TROPONIN   Result Value Ref Range    Troponin T 31 (H) 6 - 19 ng/L   LACTIC ACID   Result Value Ref Range    Lactic Acid 2.5 (H) 0.5 - 2.0 mmol/L   ESTIMATED GFR   Result Value Ref Range    GFR If African American >60 >60 mL/min/1.73 m 2    GFR If Non African American >60 >60 mL/min/1.73 m 2       RADIOLOGY/PROCEDURES  CT-HEAD W/O   Final Result      No noncontrast CT evidence of acute intracranial hemorrhage.      Moderate white matter hypodensity is present.  This is a nonspecific finding which usually is found to represent chronic microvascular disease in patient's of this demographic.  Demyelination, age indeterminant ischemia and gliosis are also common    possibilities.      Age-appropriate cerebral volume loss.      DX-CHEST-PORTABLE (1 VIEW)   Final Result      New multifocal consolidation and small left pleural fluid. This is worrisome for pneumonia or aspiration favored over edema      DX-WRIST-COMPLETE 3+ RIGHT   Final Result      Degenerative changes as above described.      Dorsal intercalated segment instability      Chondrocalcinosis.          COURSE & MEDICAL DECISION MAKING  Pertinent Labs & Imaging studies reviewed. (See chart for details)  Patient presents emergency department with weakness with 3 falls over the past 24 hours which is new for the patient.  Is also had some mild tremors which is new as well.  No history of any new medications.  No history of dizziness preceding the fall or syncope.  He states his legs just feel weak and it makes him fall.  I do not see any evidence of head trauma he has a nonfocal neurologic exam.  Do not suspect stroke at this time.  Patient however is on Eliquis and therefore a CT of the head will be ordered.   Patient is not had any fevers recently.  He did have a cough about a month ago.  Lungs sound clear on exam and is not any acute respiratory distress.  He is chronically on 4 L of oxygen which is his baseline requirement.  He has not required any increased oxygen.  He has a benign abdominal exam.  has not had any chest pain.    Chest x-ray returned and shows a possible pneumonia.  Patient was given a dose of Unasyn.  However the pattern is suggestive of possible COVID-19 infection.  Therefore I have added COVID-19 orders and place the patient in isolation.  Patient does have a mild leukopenia.  Overall he currently looks well without any increased O2 requirement.  However due to his chronic lung problems he is at significant risk for deterioration.  Patient will be admitted to the hospital for further treatment and evaluation for his pneumonia.  Wrist x-ray does not show evidence of acute fracture.  He does have some chronic instability in the wrist.    FINAL IMPRESSION     1. Weakness    2. Pneumonia of right lower lobe due to infectious organism    3. Wrist pain, acute, right    4. Sepsis without acute organ dysfunction, due to unspecified organism (HCC)    5. Suspected COVID-19 virus infection             Electronically signed by: Jessica Newton M.D., 4/9/2020 1:29 PM

## 2020-04-09 NOTE — ED NOTES
Pt transported from CT scan on [facility] back to [facility] 11.

## 2020-04-09 NOTE — ED TRIAGE NOTES
.  Chief Complaint   Patient presents with   • Weakness   • Tremors   • Fall     X 3 in the last 24 hours   • Wrist Pain     Right   Pt BIB EMS from home.  Pt has been self quarantining over the last 3 weeks.  Pt c/o having increased weakness with tremors over the last 3 days.  Pt also reports right wrist pain, sustained from multiple falls over the last 24 hours.  No chest pain or headache.  Pt on home oxygen, 4 liters nasal canula.  FSBS by .

## 2020-04-10 ENCOUNTER — PATIENT OUTREACH (OUTPATIENT)
Dept: HEALTH INFORMATION MANAGEMENT | Facility: OTHER | Age: 85
End: 2020-04-10

## 2020-04-10 ENCOUNTER — APPOINTMENT (OUTPATIENT)
Dept: RADIOLOGY | Facility: MEDICAL CENTER | Age: 85
DRG: 193 | End: 2020-04-10
Attending: INTERNAL MEDICINE
Payer: MEDICARE

## 2020-04-10 LAB
ANION GAP SERPL CALC-SCNC: 10 MMOL/L (ref 7–16)
BASOPHILS # BLD AUTO: 0.6 % (ref 0–1.8)
BASOPHILS # BLD: 0.04 K/UL (ref 0–0.12)
BUN SERPL-MCNC: 11 MG/DL (ref 8–22)
CALCIUM SERPL-MCNC: 8.4 MG/DL (ref 8.5–10.5)
CHLORIDE SERPL-SCNC: 95 MMOL/L (ref 96–112)
CO2 SERPL-SCNC: 25 MMOL/L (ref 20–33)
CREAT SERPL-MCNC: 0.5 MG/DL (ref 0.5–1.4)
CRP SERPL HS-MCNC: 0.44 MG/DL (ref 0–0.75)
EKG IMPRESSION: NORMAL
EOSINOPHIL # BLD AUTO: 0.31 K/UL (ref 0–0.51)
EOSINOPHIL NFR BLD: 4.6 % (ref 0–6.9)
ERYTHROCYTE [DISTWIDTH] IN BLOOD BY AUTOMATED COUNT: 50.7 FL (ref 35.9–50)
GLUCOSE SERPL-MCNC: 103 MG/DL (ref 65–99)
HCT VFR BLD AUTO: 32.5 % (ref 42–52)
HGB BLD-MCNC: 10.4 G/DL (ref 14–18)
IMM GRANULOCYTES # BLD AUTO: 0.02 K/UL (ref 0–0.11)
IMM GRANULOCYTES NFR BLD AUTO: 0.3 % (ref 0–0.9)
LYMPHOCYTES # BLD AUTO: 1.53 K/UL (ref 1–4.8)
LYMPHOCYTES NFR BLD: 22.6 % (ref 22–41)
MAGNESIUM SERPL-MCNC: 2 MG/DL (ref 1.5–2.5)
MCH RBC QN AUTO: 29.2 PG (ref 27–33)
MCHC RBC AUTO-ENTMCNC: 32 G/DL (ref 33.7–35.3)
MCV RBC AUTO: 91.3 FL (ref 81.4–97.8)
MONOCYTES # BLD AUTO: 0.77 K/UL (ref 0–0.85)
MONOCYTES NFR BLD AUTO: 11.4 % (ref 0–13.4)
NEUTROPHILS # BLD AUTO: 4.11 K/UL (ref 1.82–7.42)
NEUTROPHILS NFR BLD: 60.5 % (ref 44–72)
NRBC # BLD AUTO: 0 K/UL
NRBC BLD-RTO: 0 /100 WBC
PLATELET # BLD AUTO: 276 K/UL (ref 164–446)
PMV BLD AUTO: 9.1 FL (ref 9–12.9)
POTASSIUM SERPL-SCNC: 3.9 MMOL/L (ref 3.6–5.5)
RBC # BLD AUTO: 3.56 M/UL (ref 4.7–6.1)
SARS-COV-2 RNA RESP QL NAA+PROBE: NOT DETECTED
SODIUM SERPL-SCNC: 130 MMOL/L (ref 135–145)
SPECIMEN SOURCE: NORMAL
WBC # BLD AUTO: 6.8 K/UL (ref 4.8–10.8)

## 2020-04-10 PROCEDURE — 700101 HCHG RX REV CODE 250: Performed by: HOSPITALIST

## 2020-04-10 PROCEDURE — 93010 ELECTROCARDIOGRAM REPORT: CPT | Performed by: INTERNAL MEDICINE

## 2020-04-10 PROCEDURE — 94760 N-INVAS EAR/PLS OXIMETRY 1: CPT

## 2020-04-10 PROCEDURE — 99232 SBSQ HOSP IP/OBS MODERATE 35: CPT | Performed by: HOSPITALIST

## 2020-04-10 PROCEDURE — 700117 HCHG RX CONTRAST REV CODE 255: Performed by: INTERNAL MEDICINE

## 2020-04-10 PROCEDURE — 80048 BASIC METABOLIC PNL TOTAL CA: CPT

## 2020-04-10 PROCEDURE — 83735 ASSAY OF MAGNESIUM: CPT

## 2020-04-10 PROCEDURE — A9270 NON-COVERED ITEM OR SERVICE: HCPCS | Performed by: HOSPITALIST

## 2020-04-10 PROCEDURE — 700102 HCHG RX REV CODE 250 W/ 637 OVERRIDE(OP): Performed by: HOSPITALIST

## 2020-04-10 PROCEDURE — 86140 C-REACTIVE PROTEIN: CPT

## 2020-04-10 PROCEDURE — 94640 AIRWAY INHALATION TREATMENT: CPT

## 2020-04-10 PROCEDURE — 85025 COMPLETE CBC W/AUTO DIFF WBC: CPT

## 2020-04-10 PROCEDURE — 770001 HCHG ROOM/CARE - MED/SURG/GYN PRIV*

## 2020-04-10 PROCEDURE — 71275 CT ANGIOGRAPHY CHEST: CPT

## 2020-04-10 RX ORDER — LISINOPRIL 20 MG/1
40 TABLET ORAL
Status: DISCONTINUED | OUTPATIENT
Start: 2020-04-11 | End: 2020-04-11 | Stop reason: HOSPADM

## 2020-04-10 RX ORDER — METOPROLOL SUCCINATE 50 MG/1
50 TABLET, EXTENDED RELEASE ORAL
Status: DISCONTINUED | OUTPATIENT
Start: 2020-04-11 | End: 2020-04-11 | Stop reason: HOSPADM

## 2020-04-10 RX ORDER — BISACODYL 10 MG
10 SUPPOSITORY, RECTAL RECTAL
Status: DISCONTINUED | OUTPATIENT
Start: 2020-04-10 | End: 2020-04-11 | Stop reason: HOSPADM

## 2020-04-10 RX ORDER — IPRATROPIUM BROMIDE AND ALBUTEROL SULFATE 2.5; .5 MG/3ML; MG/3ML
3 SOLUTION RESPIRATORY (INHALATION)
Status: DISCONTINUED | OUTPATIENT
Start: 2020-04-10 | End: 2020-04-11 | Stop reason: HOSPADM

## 2020-04-10 RX ORDER — BUDESONIDE AND FORMOTEROL FUMARATE DIHYDRATE 160; 4.5 UG/1; UG/1
2 AEROSOL RESPIRATORY (INHALATION) 2 TIMES DAILY
Status: DISCONTINUED | OUTPATIENT
Start: 2020-04-10 | End: 2020-04-11 | Stop reason: HOSPADM

## 2020-04-10 RX ORDER — AMOXICILLIN 250 MG
2 CAPSULE ORAL 2 TIMES DAILY PRN
Status: DISCONTINUED | OUTPATIENT
Start: 2020-04-10 | End: 2020-04-11 | Stop reason: HOSPADM

## 2020-04-10 RX ORDER — POLYETHYLENE GLYCOL 3350 17 G/17G
1 POWDER, FOR SOLUTION ORAL
Status: DISCONTINUED | OUTPATIENT
Start: 2020-04-10 | End: 2020-04-11 | Stop reason: HOSPADM

## 2020-04-10 RX ADMIN — GLYCOPYRROLATE 1 CAPSULE: 15.6 CAPSULE RESPIRATORY (INHALATION) at 17:49

## 2020-04-10 RX ADMIN — VALACYCLOVIR HYDROCHLORIDE 1000 MG: 500 TABLET, FILM COATED ORAL at 05:09

## 2020-04-10 RX ADMIN — AMOXICILLIN AND CLAVULANATE POTASSIUM 1 TABLET: 875; 125 TABLET, FILM COATED ORAL at 17:49

## 2020-04-10 RX ADMIN — ATORVASTATIN CALCIUM 10 MG: 10 TABLET, FILM COATED ORAL at 20:28

## 2020-04-10 RX ADMIN — IOHEXOL 45 ML: 350 INJECTION, SOLUTION INTRAVENOUS at 01:51

## 2020-04-10 RX ADMIN — ZINC SULFATE 220 MG (50 MG) CAPSULE 220 MG: CAPSULE at 05:09

## 2020-04-10 RX ADMIN — OXYCODONE HYDROCHLORIDE AND ACETAMINOPHEN 500 MG: 500 TABLET ORAL at 05:07

## 2020-04-10 RX ADMIN — BUDESONIDE AND FORMOTEROL FUMARATE DIHYDRATE 2 PUFF: 160; 4.5 AEROSOL RESPIRATORY (INHALATION) at 17:50

## 2020-04-10 RX ADMIN — TAMSULOSIN HYDROCHLORIDE 0.4 MG: 0.4 CAPSULE ORAL at 20:28

## 2020-04-10 RX ADMIN — LISINOPRIL 20 MG: 20 TABLET ORAL at 05:08

## 2020-04-10 RX ADMIN — AMOXICILLIN AND CLAVULANATE POTASSIUM 1 TABLET: 875; 125 TABLET, FILM COATED ORAL at 05:06

## 2020-04-10 RX ADMIN — APIXABAN 2.5 MG: 2.5 TABLET, FILM COATED ORAL at 17:49

## 2020-04-10 RX ADMIN — IPRATROPIUM BROMIDE AND ALBUTEROL SULFATE 3 ML: .5; 3 SOLUTION RESPIRATORY (INHALATION) at 11:27

## 2020-04-10 RX ADMIN — METOPROLOL TARTRATE 25 MG: 25 TABLET, FILM COATED ORAL at 05:07

## 2020-04-10 RX ADMIN — APIXABAN 2.5 MG: 2.5 TABLET, FILM COATED ORAL at 05:06

## 2020-04-10 ASSESSMENT — ENCOUNTER SYMPTOMS
MYALGIAS: 1
HEARTBURN: 0
COUGH: 1
CHILLS: 1
GASTROINTESTINAL NEGATIVE: 1
CONSTIPATION: 0
ABDOMINAL PAIN: 0
HEMOPTYSIS: 0
SEIZURES: 0
NEUROLOGICAL NEGATIVE: 1
DIAPHORESIS: 0
BLOOD IN STOOL: 0
FEVER: 1
SPUTUM PRODUCTION: 1
VOMITING: 0
DIARRHEA: 0
DIZZINESS: 0
HEADACHES: 0
SHORTNESS OF BREATH: 1
PALPITATIONS: 0
DOUBLE VISION: 0
FOCAL WEAKNESS: 0
LOSS OF CONSCIOUSNESS: 0
BRUISES/BLEEDS EASILY: 0
NERVOUS/ANXIOUS: 1
WHEEZING: 0
NAUSEA: 0
EYES NEGATIVE: 1

## 2020-04-10 ASSESSMENT — PATIENT HEALTH QUESTIONNAIRE - PHQ9
1. LITTLE INTEREST OR PLEASURE IN DOING THINGS: NOT AT ALL
SUM OF ALL RESPONSES TO PHQ9 QUESTIONS 1 AND 2: 0
2. FEELING DOWN, DEPRESSED, IRRITABLE, OR HOPELESS: NOT AT ALL

## 2020-04-10 NOTE — H&P
Hospital Medicine History & Physical Note    Date of Service  4/9/2020    Primary Care Physician  Barbara Wilkins M.D.    Consultants  Infectious disease    Code Status  Full    Chief Complaint  Shortness of breath, increasing weakness recent ground-level fall x3 at home.    History of Presenting Illness  This is a 85 y.o. male with a history of chronic obstructive pulmonary disease on chronic 2 L at home, dyslipidemia, essential hypertension, pulmonary hypertension who presented 4/9/2020 with increasing shortness of breath at home as well as increased weakness and recent fall x3.  Patient states that he is been having his legs give out on him at home due to weakness.  States that he has been too weak to get up and had to call paramedics.  He is noted to have to increase his supplemental oxygen at home.  He does state having his grandkids over at his house approximately 2 weeks ago and they were sick.  He states that he does go out to the store but his wife goes inside most the time only.  Patient has dry cough denies any fevers, vomiting, diarrhea.  Patient does state recent sore throat proximally for 5 days ago.  Denies any current sore throat.  Patient denies any sinus congestion.    The patient here in the emergency room is been found to have a multifocal pneumonia on his x-ray with increased oxygen needs.  He has diminished breath sounds bilaterally.  Labs may indicate potential for viral infection and he will be ruled out for coronavirus.    Review of Systems  Review of Systems   Constitutional: Positive for malaise/fatigue and weight loss. Negative for fever.   HENT: Negative for sore throat.    Eyes: Negative for discharge.   Respiratory: Positive for cough and shortness of breath. Negative for sputum production, wheezing and stridor.    Cardiovascular: Negative for chest pain, palpitations and leg swelling.   Gastrointestinal: Negative for abdominal pain, diarrhea and nausea.   Genitourinary: Negative for  dysuria and frequency.   Musculoskeletal: Negative for back pain, joint pain and neck pain.   Neurological: Positive for weakness. Negative for dizziness, sensory change, focal weakness and headaches.   Psychiatric/Behavioral: The patient is not nervous/anxious.        Past Medical History   has a past medical history of Centrilobular emphysema (HCC) (12/3/2015), COPD (chronic obstructive pulmonary disease) (HCC), Dyslipidemia, Hypertension, Pulmonary emphysema (HCC), and Pulmonary hypertension (HCC).    Surgical History   has a past surgical history that includes hernia repair (1990).     Family History  family history includes Cancer in his brother; Heart Attack in his brother; Heart Disease in his father; Other in his brother.     Social History   reports that he quit smoking about 12 years ago. He started smoking about 66 years ago. He has a 54.00 pack-year smoking history. He has never used smokeless tobacco. He reports current alcohol use of about 1.0 oz of alcohol per week. He reports that he does not use drugs.      Allergies  No Known Allergies    Medications  Prior to Admission Medications   Prescriptions Last Dose Informant Patient Reported? Taking?   Fluticasone-Umeclidin-Vilant (TRELEGY ELLIPTA) 100-62.5-25 MCG/INH AEROSOL POWDER, BREATH ACTIVATED 4/9/2020 at AM Family Member No No   Sig: Inhale 1 Puff by mouth every day. Rinse mouth after use   apixaban (ELIQUIS) 2.5mg Tab 4/9/2020 at AM Family Member Yes Yes   Sig: Take 2.5 mg by mouth 2 Times a Day.   atorvastatin (LIPITOR) 10 MG Tab 4/8/2020 at PM Historical Yes Yes   Sig: Take 10 mg by mouth every evening.   lisinopril (PRINIVIL) 20 MG Tab 4/9/2020 at AM Historical Yes Yes   Sig: Take 20 mg by mouth 2 Times a Day.   metoprolol (LOPRESSOR) 25 MG Tab 4/9/2020 at AM Historical Yes Yes   Sig: Take 25 mg by mouth 2 times a day.   tamsulosin (FLOMAX) 0.4 MG capsule 4/8/2020 at PM Historical Yes No   Sig: Take 1 Cap by mouth every bedtime.    valacyclovir (VALTREX) 1 GM Tab 4/9/2020 at AM Family Member Yes No   Sig: Take 1,000 mg by mouth every day. Scheduled.   Maintenance Medication.      Facility-Administered Medications: None       Physical Exam  Temp:  [36.5 °C (97.7 °F)] 36.5 °C (97.7 °F)  Pulse:  [103-105] 103  Resp:  [20-24] 20  BP: (124-143)/(75-99) 143/75  SpO2:  [94 %-100 %] 94 %    Physical Exam  Vitals signs reviewed.   Constitutional:       Appearance: Normal appearance. He is ill-appearing. He is not diaphoretic.   HENT:      Head: Normocephalic and atraumatic.      Nose: Nose normal.      Mouth/Throat:      Mouth: Mucous membranes are moist.      Pharynx: No oropharyngeal exudate.   Eyes:      General: No scleral icterus.        Right eye: No discharge.         Left eye: No discharge.      Extraocular Movements: Extraocular movements intact.      Conjunctiva/sclera: Conjunctivae normal.      Pupils: Pupils are equal, round, and reactive to light.   Neck:      Musculoskeletal: No muscular tenderness.      Vascular: No carotid bruit.   Cardiovascular:      Rate and Rhythm: Normal rate and regular rhythm.      Pulses:           Radial pulses are 2+ on the right side and 2+ on the left side.        Dorsalis pedis pulses are 2+ on the right side and 2+ on the left side.      Heart sounds: No murmur.   Pulmonary:      Breath sounds: Decreased air movement present. Decreased breath sounds present. No wheezing or rales.      Comments: Short shallow breaths in short sentences  Abdominal:      General: Bowel sounds are normal. There is no distension.      Palpations: Abdomen is soft.      Tenderness: There is no abdominal tenderness.   Musculoskeletal:         General: No swelling or tenderness.      Right lower leg: No edema.      Left lower leg: No edema.   Lymphadenopathy:      Cervical: No cervical adenopathy.   Skin:     Coloration: Skin is not jaundiced or pale.   Neurological:      General: No focal deficit present.      Mental Status: He  is alert and oriented to person, place, and time. Mental status is at baseline.      Cranial Nerves: No cranial nerve deficit.   Psychiatric:         Mood and Affect: Mood normal.         Behavior: Behavior normal.         Laboratory:  Recent Labs     04/09/20  1305   WBC 8.3   RBC 3.82*   HEMOGLOBIN 11.4*   HEMATOCRIT 36.3*   MCV 95.0   MCH 29.8   MCHC 31.4*   RDW 52.4*   PLATELETCT 301   MPV 9.1     Recent Labs     04/09/20  1305   SODIUM 130*   POTASSIUM 4.0   CHLORIDE 94*   CO2 26   GLUCOSE 118*   BUN 20   CREATININE 0.58   CALCIUM 8.8     Recent Labs     04/09/20  1305   ALTSGPT 13   ASTSGOT 16   ALKPHOSPHAT 72   TBILIRUBIN 0.6   GLUCOSE 118*         No results for input(s): NTPROBNP in the last 72 hours.      Recent Labs     04/09/20  1305   TROPONINT 31*       Urinalysis:    No results found     Imaging:  CT-HEAD W/O   Final Result      No noncontrast CT evidence of acute intracranial hemorrhage.      Moderate white matter hypodensity is present.  This is a nonspecific finding which usually is found to represent chronic microvascular disease in patient's of this demographic.  Demyelination, age indeterminant ischemia and gliosis are also common    possibilities.      Age-appropriate cerebral volume loss.      DX-CHEST-PORTABLE (1 VIEW)   Final Result      New multifocal consolidation and small left pleural fluid. This is worrisome for pneumonia or aspiration favored over edema      DX-WRIST-COMPLETE 3+ RIGHT   Final Result      Degenerative changes as above described.      Dorsal intercalated segment instability      Chondrocalcinosis.            Assessment/Plan:  I anticipate this patient will require at least two midnights for appropriate medical management, necessitating inpatient admission.    * Multifocal pneumonia  Assessment & Plan  Augmentin and azithromycin has been initiated.  Procalcitonin pending  Viral etiology suspected given normal WBC and lymphopenia, elevated ferritin, LDH, and lactic  acid  As there is a current worldwide pandemic of coronavirus he is already been swabbed and will be ruled out.  He is in contact and droplet precautions.  Patient will be initiated on vitamin C and zinc.  COVID ID consult    Acute on chronic respiratory failure with hypoxia (HCC)- (present on admission)  Assessment & Plan  RT per protocol  Supplemental oxygen  Monitor vitals  Encourage deep inspiratory efforts  Acutely worsen secondary to concern of multifocal pneumonia    COPD exacerbation (HCC)- (present on admission)  Assessment & Plan  Holding off on steroid administration at this point time secondary to concern of viral infection  Bronchodilators as needed  RT per protocol    Hypomagnesemia  Assessment & Plan  Supplement and monitor    PAH (pulmonary artery hypertension) (HCC)- (present on admission)  Assessment & Plan  Supplemental oxygen    HTN (hypertension)- (present on admission)  Assessment & Plan  Monitor vitals  Lisinopril 20 mg twice daily, metoprolol 25 mg twice daily ongoing active management    Atrial fibrillation (HCC)  Assessment & Plan  On apixaban and rate controlled on metoprolol 25 mg twice daily  Monitor on telemetry and vitals    HLD (hyperlipidemia)- (present on admission)  Assessment & Plan  Atorvastatin for ongoing management      VTE prophylaxis: Apixaban

## 2020-04-10 NOTE — DISCHARGE PLANNING
Care Transition Team Assessment    Chart reviewed. Lives with spouse and daughter. PCP Dr. Wilkins. Unknown needs @ present. CM will follow as needed.    Information Source  Information Given By: Other (Comments)(Chart reviewed)  Informant's Name: Garrett Alvarez    Readmission Evaluation  Is this a readmission?: No    Interdisciplinary Discharge Planning  Does Admitting Nurse Feel This Could be a Complex Discharge?: No  Primary Care Physician: Barbara Wilkins  Lives with - Patient's Self Care Capacity: Spouse, Adult Children  Patient or legal guardian wants to designate a caregiver (see row info): No  Support Systems: Children, Spouse / Significant Other  Housing / Facility: [address] [facility]  Do You Take your Prescribed Medications Regularly: Yes  Able to Return to Previous ADL's: (Unknown)  Mobility Issues: Yes  Prior Services: None  Patient Expects to be Discharged to:: Home  Assistance Needed: Unknown at this Time  Durable Medical Equipment: Home Oxygen  DME Provider / Phone: Unknown    Discharge Preparedness  What are your discharge supports?: Child, Spouse  Prior Functional Level: Ambulatory    Functional Assesment  Prior Functional Level: Ambulatory    Finances  Prescription Coverage: Yes    Discharge Risks or Barriers  Patient risk factors: Vulnerable adult    Anticipated Discharge Information  Anticipated discharge disposition: Home  Discharge Address: [address] [place] [address]   Discharge Contact Phone Number: [phone]

## 2020-04-10 NOTE — CARE PLAN
Problem: Communication  Goal: The ability to communicate needs accurately and effectively will improve  Outcome: PROGRESSING AS EXPECTED     Problem: Safety  Goal: Will remain free from falls  Outcome: PROGRESSING AS EXPECTED  Note: Pt educated on safety precautions, utilization of the call light, and bed alarm.  Pt verbalized understanding.      Problem: Infection  Goal: Will remain free from infection  Outcome: PROGRESSING AS EXPECTED  Note: Implement standard precautions and perform handwashing before and after patient contact. RN will follow protocols and necessary steps to minimize the spread of infection.  RN educated pt and any visitors on proper hand hygiene.

## 2020-04-10 NOTE — CARE PLAN
Pt instructed on use for call light and pt agrees to call with needs. Pt provided with phone and informed RN will call prior to coming into room to assess for pt needs. Pt shows understanding. Will continue to monitor.

## 2020-04-10 NOTE — CONSULTS
Chief complaint:    Chief Complaint   Patient presents with   • Weakness   • Tremors   • Fall     X 3 in the last 24 hours   • Wrist Pain     Right     Date of admission:  4/9/2020  Date of consult:  4/9/2020    Recommendation:     COVID-19 testing has been initiated, follow-up results.  Patient to remain on Droplet/Contact/Eye while awaiting results.       Infection Prevention called?  :  No

## 2020-04-10 NOTE — PROGRESS NOTES
Report received from ABRAM Escoto.  Patient sitting up in bed watching TV.  POC gone over with pt and all questions answered at this time.  Patient A & O x 4.  No apparent signs of distress.  Safety precautions in place.  Patient educated to call for assistance.  Will continue to monitor.

## 2020-04-10 NOTE — PROGRESS NOTES
Hospital Medicine Daily Progress Note    Date of Service  4/10/2020    Chief Complaint  85 y.o. male admitted 4/9/2020 with shortness of breath    Hospital Course    Patient is a 85-year-old male who lives at home and the patient has become short of breath.  The patient notes this developed fevers and cough.  Patient at this point has community-acquired pneumonia patient was initiated at this point on antibiotics.  Patient's cultures are pending.  Due to COVID-19 infection possibly the patient's COVID-19 test at this point is pending.  Continue at this point oxygen support nebulizer treatments and RT protocol      Interval Problem Update  COVID-19 result pending  Oxygen support  RT protocol  Antibiotics to continue for community-acquired pneumonia  Pain management    Consultants/Specialty  None    Code Status  Full code    Disposition  To be determined    Review of Systems  Review of Systems   Constitutional: Positive for chills, fever and malaise/fatigue. Negative for diaphoresis.   HENT: Negative.    Eyes: Negative.  Negative for double vision.   Respiratory: Positive for cough, sputum production and shortness of breath. Negative for hemoptysis and wheezing.    Cardiovascular: Positive for chest pain. Negative for palpitations and leg swelling.   Gastrointestinal: Negative.  Negative for abdominal pain, blood in stool, constipation, diarrhea, heartburn, nausea and vomiting.   Genitourinary: Negative.  Negative for frequency, hematuria and urgency.   Musculoskeletal: Positive for myalgias. Negative for joint pain.   Skin: Negative.  Negative for itching and rash.   Neurological: Negative.  Negative for dizziness, focal weakness, seizures, loss of consciousness and headaches.   Endo/Heme/Allergies: Negative.  Does not bruise/bleed easily.   Psychiatric/Behavioral: Negative for suicidal ideas. The patient is nervous/anxious.    All other systems reviewed and are negative.       Physical Exam  Temp:  [36.5 °C (97.7  °F)-37.3 °C (99.2 °F)] 36.5 °C (97.7 °F)  Pulse:  [] 86  Resp:  [18-24] 20  BP: (120-157)/(61-92) 145/65  SpO2:  [94 %-99 %] 99 %    Physical Exam  Vitals signs and nursing note reviewed.   Constitutional:       Appearance: Normal appearance. He is well-developed and normal weight.   HENT:      Head: Normocephalic and atraumatic.      Right Ear: External ear normal.      Left Ear: External ear normal.      Nose: Nose normal.      Mouth/Throat:      Mouth: Mucous membranes are moist.      Pharynx: Oropharynx is clear.   Eyes:      Extraocular Movements: Extraocular movements intact.      Conjunctiva/sclera: Conjunctivae normal.      Pupils: Pupils are equal, round, and reactive to light.   Neck:      Musculoskeletal: Normal range of motion and neck supple.      Thyroid: No thyromegaly.      Vascular: No JVD.   Cardiovascular:      Rate and Rhythm: Normal rate and regular rhythm.      Pulses: Normal pulses.      Heart sounds: Normal heart sounds.   Pulmonary:      Effort: Accessory muscle usage and prolonged expiration present.      Breath sounds: Decreased air movement present. Examination of the right-upper field reveals decreased breath sounds and rhonchi. Examination of the left-upper field reveals decreased breath sounds and rhonchi. Examination of the right-middle field reveals decreased breath sounds and rhonchi. Examination of the left-middle field reveals decreased breath sounds and rhonchi. Examination of the right-lower field reveals decreased breath sounds and rhonchi. Examination of the left-lower field reveals decreased breath sounds and rhonchi. Decreased breath sounds and rhonchi present.   Chest:      Chest wall: No tenderness.   Abdominal:      General: Abdomen is flat. Bowel sounds are normal. There is no distension.      Palpations: Abdomen is soft. There is no mass.      Tenderness: There is no abdominal tenderness. There is no guarding or rebound.   Musculoskeletal: Normal range of motion.       Right lower leg: No edema.      Left lower leg: No edema.   Lymphadenopathy:      Cervical: No cervical adenopathy.   Skin:     General: Skin is warm and dry.      Capillary Refill: Capillary refill takes 2 to 3 seconds.      Findings: No rash.   Neurological:      General: No focal deficit present.      Mental Status: He is alert and oriented to person, place, and time. Mental status is at baseline.      Cranial Nerves: No cranial nerve deficit.      Deep Tendon Reflexes: Reflexes are normal and symmetric.   Psychiatric:         Mood and Affect: Mood normal.         Behavior: Behavior normal.         Thought Content: Thought content normal.         Judgment: Judgment normal.         Fluids    Intake/Output Summary (Last 24 hours) at 4/10/2020 1508  Last data filed at 4/10/2020 0900  Gross per 24 hour   Intake 120 ml   Output 1875 ml   Net -1755 ml       Laboratory  Recent Labs     04/09/20  1305 04/10/20  0452   WBC 8.3 6.8   RBC 3.82* 3.56*   HEMOGLOBIN 11.4* 10.4*   HEMATOCRIT 36.3* 32.5*   MCV 95.0 91.3   MCH 29.8 29.2   MCHC 31.4* 32.0*   RDW 52.4* 50.7*   PLATELETCT 301 276   MPV 9.1 9.1     Recent Labs     04/09/20  1305 04/10/20  0452   SODIUM 130* 130*   POTASSIUM 4.0 3.9   CHLORIDE 94* 95*   CO2 26 25   GLUCOSE 118* 103*   BUN 20 11   CREATININE 0.58 0.50   CALCIUM 8.8 8.4*                   Imaging  CT-CTA CHEST PULMONARY ARTERY W/ RECONS   Final Result      1.  No central or segmental pulmonary embolus   2.  Commonly reported imaging features of COVID-19 pneumonia are present. Other processes such as other infectious pneumonias, drug toxicity or connective tissue disease can cause a similar imaging pattern.   3.  BILATERAL pleural effusions   4.  Enlarged subcarinal and LEFT hilar lymph nodes   5.  8mm RIGHT middle lobe pulmonary nodule   6.  Emphysema   7.  Atherosclerosis      RECOMMENDATION FOR PULMONARY NODULE EVALUATION:   Low Risk: CT at 6-12 months, then consider CT at 18-24 months      High  Risk: CT at 6-12 months, then CT at 18-24 months      Low Risk - Minimal or absent history of smoking and of other known risk factors.      High Risk - History of smoking or of other known risk factors.      Note: These recommendations do not apply to lung cancer screening, patients with immunosuppression, or patients with known primary cancer.      Fleischner Society 2017 Guidelines for Management of Incidentally Detected Pulmonary Nodules in Adults      Findings were discussed with Dr. Dior on 4/10/2020 2:13 AM.      CT-HEAD W/O   Final Result      No noncontrast CT evidence of acute intracranial hemorrhage.      Moderate white matter hypodensity is present.  This is a nonspecific finding which usually is found to represent chronic microvascular disease in patient's of this demographic.  Demyelination, age indeterminant ischemia and gliosis are also common    possibilities.      Age-appropriate cerebral volume loss.      DX-CHEST-PORTABLE (1 VIEW)   Final Result      New multifocal consolidation and small left pleural fluid. This is worrisome for pneumonia or aspiration favored over edema      DX-WRIST-COMPLETE 3+ RIGHT   Final Result      Degenerative changes as above described.      Dorsal intercalated segment instability      Chondrocalcinosis.           Assessment/Plan  * Multifocal pneumonia  Assessment & Plan  According to patient he is not had any sick contacts.  The patient at this point has multifocal pneumonia very indicative of COVID-19 infection.  The patient at this point also potentially has secondary community-acquired pneumonia.  Patient will continue with antibiotic treatment.  We will continue with fever management with Tylenol.  Avoid other NSAIDs or fluid resuscitation and avoid at this point steroids.  If the patient comes back positive he might be a very high candidate for hydroxychloroquine.    Acute on chronic respiratory failure with hypoxia (HCC)- (present on admission)  Assessment &  Plan  Continue with oxygen support to keep oxygen saturations above 90%.    COPD exacerbation (HCC)- (present on admission)  Assessment & Plan  Continue with RT protocol nebulizer treatments and antibiotics.  Holding off on any steroid management until we are sure the patient is COVID-19 negative.    Hypomagnesemia  Assessment & Plan  Monitor magnesium levels and give magnesium supplementation.    PAH (pulmonary artery hypertension) (HCC)- (present on admission)  Assessment & Plan  If patient continues to have significant pulmonary hypertension patient will need preload reduction and the patient will need at this point outpatient evaluation to cardiology for pulmonary hypertension reduction.    HTN (hypertension)- (present on admission)  Assessment & Plan  Optimize blood pressure management.  Patient currently is on a combination of metoprolol and lisinopril.  Continue with hydralazine as needed.    Atrial fibrillation (HCC)  Assessment & Plan  Continue at this point with rate control using metoprolol XL and continue with anticoagulation using apixaban.    HLD (hyperlipidemia)- (present on admission)  Assessment & Plan  Low-fat low-cholesterol diet  Statin  Fasting lipid panel       VTE prophylaxis: SCDs

## 2020-04-10 NOTE — ASSESSMENT & PLAN NOTE
Optimize blood pressure management.  Patient currently is on a combination of metoprolol and lisinopril.  Continue with hydralazine as needed.

## 2020-04-10 NOTE — ASSESSMENT & PLAN NOTE
If patient continues to have significant pulmonary hypertension patient will need preload reduction and the patient will need at this point outpatient evaluation to cardiology for pulmonary hypertension reduction.

## 2020-04-10 NOTE — ASSESSMENT & PLAN NOTE
According to patient he is not had any sick contacts.  The patient at this point has multifocal pneumonia very indicative of COVID-19 infection.  The patient at this point also potentially has secondary community-acquired pneumonia.  Patient will continue with antibiotic treatment.  We will continue with fever management with Tylenol.  Avoid other NSAIDs or fluid resuscitation and avoid at this point steroids.  If the patient comes back positive he might be a very high candidate for hydroxychloroquine.

## 2020-04-10 NOTE — ASSESSMENT & PLAN NOTE
Continue with RT protocol nebulizer treatments and antibiotics.  Holding off on any steroid management until we are sure the patient is COVID-19 negative.

## 2020-04-11 ENCOUNTER — DOCUMENTATION (OUTPATIENT)
Dept: RESPIRATORY THERAPY | Facility: MEDICAL CENTER | Age: 85
End: 2020-04-11

## 2020-04-11 ENCOUNTER — HOME HEALTH ADMISSION (OUTPATIENT)
Dept: HOME HEALTH SERVICES | Facility: HOME HEALTHCARE | Age: 85
End: 2020-04-11
Payer: MEDICARE

## 2020-04-11 VITALS
RESPIRATION RATE: 25 BRPM | WEIGHT: 130.95 LBS | OXYGEN SATURATION: 94 % | HEART RATE: 90 BPM | TEMPERATURE: 98.7 F | BODY MASS INDEX: 21.82 KG/M2 | DIASTOLIC BLOOD PRESSURE: 62 MMHG | HEIGHT: 65 IN | SYSTOLIC BLOOD PRESSURE: 129 MMHG

## 2020-04-11 PROBLEM — J18.9 MULTIFOCAL PNEUMONIA: Status: RESOLVED | Noted: 2020-04-09 | Resolved: 2020-04-11

## 2020-04-11 PROCEDURE — 700102 HCHG RX REV CODE 250 W/ 637 OVERRIDE(OP): Performed by: HOSPITALIST

## 2020-04-11 PROCEDURE — A9270 NON-COVERED ITEM OR SERVICE: HCPCS | Performed by: HOSPITALIST

## 2020-04-11 PROCEDURE — 99239 HOSP IP/OBS DSCHRG MGMT >30: CPT | Performed by: HOSPITALIST

## 2020-04-11 PROCEDURE — 97161 PT EVAL LOW COMPLEX 20 MIN: CPT

## 2020-04-11 RX ADMIN — OXYCODONE HYDROCHLORIDE AND ACETAMINOPHEN 500 MG: 500 TABLET ORAL at 05:19

## 2020-04-11 RX ADMIN — LISINOPRIL 40 MG: 20 TABLET ORAL at 05:18

## 2020-04-11 RX ADMIN — ZINC SULFATE 220 MG (50 MG) CAPSULE 220 MG: CAPSULE at 05:20

## 2020-04-11 RX ADMIN — GLYCOPYRROLATE 1 CAPSULE: 15.6 CAPSULE RESPIRATORY (INHALATION) at 05:27

## 2020-04-11 RX ADMIN — AMOXICILLIN AND CLAVULANATE POTASSIUM 1 TABLET: 875; 125 TABLET, FILM COATED ORAL at 05:19

## 2020-04-11 RX ADMIN — METOPROLOL SUCCINATE 50 MG: 50 TABLET, EXTENDED RELEASE ORAL at 05:19

## 2020-04-11 RX ADMIN — APIXABAN 2.5 MG: 2.5 TABLET, FILM COATED ORAL at 05:19

## 2020-04-11 RX ADMIN — VALACYCLOVIR HYDROCHLORIDE 1000 MG: 500 TABLET, FILM COATED ORAL at 05:19

## 2020-04-11 RX ADMIN — BUDESONIDE AND FORMOTEROL FUMARATE DIHYDRATE 2 PUFF: 160; 4.5 AEROSOL RESPIRATORY (INHALATION) at 05:20

## 2020-04-11 ASSESSMENT — COGNITIVE AND FUNCTIONAL STATUS - GENERAL
SUGGESTED CMS G CODE MODIFIER MOBILITY: CI
MOBILITY SCORE: 23
CLIMB 3 TO 5 STEPS WITH RAILING: A LITTLE

## 2020-04-11 ASSESSMENT — GAIT ASSESSMENTS
GAIT LEVEL OF ASSIST: CONTACT GUARD ASSIST
DISTANCE (FEET): 25
ASSISTIVE DEVICE: FRONT WHEEL WALKER

## 2020-04-11 NOTE — DISCHARGE PLANNING
Call to wife (Hui) to inform that pt will need to isolate for 14 days when he is discharged. Hui will speak with her daughter Jessica, whom they live with, to figure a plan. I will call her back in 15 minutes.

## 2020-04-11 NOTE — CARE PLAN
Problem: Communication  Goal: The ability to communicate needs accurately and effectively will improve  Outcome: PROGRESSING AS EXPECTED     Problem: Safety  Goal: Will remain free from injury  Outcome: PROGRESSING AS EXPECTED  Goal: Will remain free from falls  Outcome: PROGRESSING AS EXPECTED     Problem: Pain Management  Goal: Pain level will decrease to patient's comfort goal  Outcome: PROGRESSING AS EXPECTED     Problem: Infection  Goal: Will remain free from infection  Outcome: PROGRESSING AS EXPECTED     Problem: Venous Thromboembolism (VTW)/Deep Vein Thrombosis (DVT) Prevention:  Goal: Patient will participate in Venous Thrombosis (VTE)/Deep Vein Thrombosis (DVT)Prevention Measures  Outcome: PROGRESSING AS EXPECTED     Problem: Psychosocial Needs:  Goal: Level of anxiety will decrease  Outcome: PROGRESSING AS EXPECTED     Problem: Fluid Volume:  Goal: Will maintain balanced intake and output  Outcome: PROGRESSING AS EXPECTED     Problem: Respiratory:  Goal: Respiratory status will improve  Outcome: PROGRESSING AS EXPECTED     Problem: Bowel/Gastric:  Goal: Normal bowel function is maintained or improved  Outcome: PROGRESSING AS EXPECTED  Goal: Will not experience complications related to bowel motility  Outcome: PROGRESSING AS EXPECTED     Problem: Urinary Elimination:  Goal: Ability to reestablish a normal urinary elimination pattern will improve  Outcome: PROGRESSING AS EXPECTED     Problem: Skin Integrity  Goal: Risk for impaired skin integrity will decrease  Outcome: PROGRESSING AS EXPECTED     Problem: Mobility  Goal: Risk for activity intolerance will decrease  Outcome: PROGRESSING AS EXPECTED     Problem: Medication  Goal: Compliance with prescribed medication will improve  Outcome: PROGRESSING AS EXPECTED     Problem: Knowledge Deficit  Goal: Knowledge of disease process/condition, treatment plan, diagnostic tests, and medications will improve  Outcome: PROGRESSING AS EXPECTED  Goal: Knowledge of  the prescribed therapeutic regimen will improve  Outcome: PROGRESSING AS EXPECTED     Problem: Discharge Barriers/Planning  Goal: Patient's continuum of care needs will be met  Outcome: PROGRESSING AS EXPECTED      Patient/Caregiver provided printed discharge information.

## 2020-04-11 NOTE — DISCHARGE INSTRUCTIONS
Self-Isolating at Home  If it is determined that you do not need to be hospitalized and can be isolated at home please follow the follow the prevention steps below as based on CDC guidelines.  Stay home except to get medical care  People who are mildly ill with unconfirmed COVID-19 or have any other infectious respiratory illness are able to isolate at home during their illness. You should restrict activities outside your home, except for getting medical care. Do not go to work, school, or public areas. Avoid using public transportation, ride-sharing, or taxis.  Call ahead before visiting your doctor  If you have a medical appointment, call the healthcare provider and tell them that you have or may have unconfirmed COVID-19 or another possibly contagious respiratory illness. This will help the healthcare provider’s office take steps to keep other people from getting infected or exposed.  Separate yourself from other people and animals in your home  As much as possible, you should stay in a specific room and away from other people in your home. Also, you should use a separate bathroom, if available.  You should restrict contact with pets and other animals while you are sick, just like you would around other people. When possible, have another member of your household care for your animals while you are sick. If you must care for your pet or be around animals while you are sick, wash your hands before and after you interact with pets.  Wear a facemask  You should wear a facemask when you are around other people (e.g., sharing a room or vehicle) or pets and before you enter a healthcare provider’s office. If you are not able to wear a facemask (for example, because it causes trouble breathing), then people who live with you should not stay in the same room with you, or they should wear a facemask if they enter your room.  Cover your coughs and sneezes  Cover your mouth and nose with a tissue when you cough or sneeze.  Throw used tissues in a lined trash can. Immediately wash your hands with soap and water for at least 20 seconds or, if soap and water are not available, clean your hands with an alcohol-based hand  that contains at least 60% alcohol.  Clean your hands often  Wash your hands often with soap and water for at least 20 seconds, especially after blowing your nose, coughing, or sneezing; going to the bathroom; and before eating or preparing food. If soap and water are not readily available, use an alcohol-based hand  with at least 60% alcohol, covering all surfaces of your hands and rubbing them together until they feel dry.  Soap and water are the best option if hands are visibly dirty. Avoid touching your eyes, nose, and mouth with unwashed hands.  Avoid sharing personal household items  You should not share dishes, drinking glasses, cups, eating utensils, towels, or bedding with other people or pets in your home. After using these items, they should be washed thoroughly with soap and water.  Clean all “high-touch” surfaces everyday  High touch surfaces include counters, tabletops, doorknobs, bathroom fixtures, toilets, phones, keyboards, tablets, and bedside tables. Also, clean any surfaces that may have blood, stool, or body fluids on them. Use a household cleaning spray or wipe, according to the label instructions. Labels contain instructions for safe and effective use of the cleaning product including precautions you should take when applying the product, such as wearing gloves and making sure you have good ventilation during use of the product.  Monitor your symptoms  Seek prompt medical attention if your illness is worsening (e.g., difficulty breathing). Before seeking care, call your healthcare provider and tell them that you have, or are being evaluated for, unconfirmed COVID-19 or another infectious respiratory illness. Put on a facemask before you enter the facility. These steps will help  the healthcare provider’s office to keep other people in the office or waiting room from getting infected or exposed. Ask your healthcare provider to call the local or state health department. Persons who are placed under active monitoring or facilitated self-monitoring should follow instructions provided by their local health department or occupational health professionals, as appropriate. When working with your local health department check their available hours.  If you have a medical emergency and need to call 911, notify the dispatch personnel that you have, or are being evaluated for unconfirmed COVID-19 or another infectious respiratory illness. If possible, put on a facemask before emergency medical services arrive.  Discontinuing home isolation  Patients with unconfirmed COVID-19 or other infectious respiratory illnesses should remain under home isolation precautions until the risk of secondary transmission to others is thought to be low. In general that means 72 hours after fever resolves without the use of fever reducing medications, AND symptoms have improved AND at least 7 days since symptoms first appeared. If you have questions or concerns consult your healthcare providers or your local health department.  Per CDC guidelines, you are not required to provide a healthcare provider’s note to validate your illness or to return to work, as healthcare provider offices and medical facilities may be extremely busy and not able to provide such documentation in a timely way.        Discharge Instructions    Discharged to home by car with relative. Discharged via wheelchair, hospital escort: Yes.  Special equipment needed: Walker    Be sure to schedule a follow-up appointment with your primary care doctor or any specialists as instructed.     Discharge Plan:   Diet Plan: Discussed  Activity Level: Discussed  Confirmed Follow up Appointment: No (Comments)  Confirmed Symptoms Management: Discussed  Medication  Reconciliation Updated: Yes  Influenza Vaccine Indication: Not indicated: Previously immunized this influenza season and > 8 years of age    I understand that a diet low in cholesterol, fat, and sodium is recommended for good health. Unless I have been given specific instructions below for another diet, I accept this instruction as my diet prescription.   Other diet: cardiac    Special Instructions: None    · Is patient discharged on Warfarin / Coumadin?   No     Depression / Suicide Risk    As you are discharged from this [facility] facility, it is important to learn how to keep safe from harming yourself.    Recognize the warning signs:  · Abrupt changes in personality, positive or negative- including increase in energy   · Giving away possessions  · Change in eating patterns- significant weight changes-  positive or negative  · Change in sleeping patterns- unable to sleep or sleeping all the time   · Unwillingness or inability to communicate  · Depression  · Unusual sadness, discouragement and loneliness  · Talk of wanting to die  · Neglect of personal appearance   · Rebelliousness- reckless behavior  · Withdrawal from people/activities they love  · Confusion- inability to concentrate     If you or a loved one observes any of these behaviors or has concerns about self-harm, here's what you can do:  · Talk about it- your feelings and reasons for harming yourself  · Remove any means that you might use to hurt yourself (examples: pills, rope, extension cords, firearm)  · Get professional help from the community (Mental Health, Substance Abuse, psychological counseling)  · Do not be alone:Call your Safe Contact- someone whom you trust who will be there for you.  · Call your local CRISIS HOTLINE 945-2972 or 502-930-8536  · Call your local Children's Mobile Crisis Response Team [place] (422) 002-2514 or www.[place] [facility] [place]  · Call the toll free National Suicide Prevention Hotlines   · National Suicide  Prevention Lifeline 301-679-PQMX (2063)  · [facility] Line Network 800-SUICIDE (942-9173)            Discharge Instructions per Margarito Land M.D.    Quarantine 14 days as discussed    DIET: regular    ACTIVITY: as tolerated    DIAGNOSIS: pneumonia    Return to ER if symptoms worsen

## 2020-04-11 NOTE — PROGRESS NOTES
Patient up in bed. Just finished dinner. No distress noted or c/o voiced. Continues to be a little SOB especially with exertion. Tremor noted with tactile manipulation. Lazar in place with clear, yellow urine. Small skin tear noted to outer aspect of right wrist. States that he fell at home.

## 2020-04-11 NOTE — FACE TO FACE
Face to Face Note  -  Durable Medical Equipment    Margarito Land M.D. - NPI: [id number]  I certify that this patient is under my care and that they had a durable medical equipment(DME)face to face encounter by myself that meets the physician DME face-to-face encounter requirements with this patient on:    Date of encounter:   Patient:                    MRN:                       YOB: 2020  Garrett Alvarez  [id number]  8/8/1934     The encounter with the patient was in whole, or in part, for the following medical condition, which is the primary reason for durable medical equipment:  Other - COVID-19    I certify that, based on my findings, the following durable medical equipment is medically necessary:  Oxygen.    Home front wheel walker         ,     ,         My Clinical findings support the need for the above equipment due to:  Abnormal Gait    Supporting Symptoms: home walker per PT eval

## 2020-04-11 NOTE — FACE TO FACE
Face to Face Supporting Documentation - Home Health    The encounter with this patient was in whole or in part the primary reason for home health admission.    Date of encounter:   Patient:                    MRN:                       YOB: 2020  Garrett Alvarez  [id number]  8/8/1934     Home health to see patient for:  Skilled Nursing care for assessment, interventions & education    Skilled need for:  Comment: COVID-19    Skilled nursing interventions to include:  Comment: home safety and weakness    Homebound status evidenced by:  Have a condition such that leaving his or her home is medically contraindicated. Leaving home requires a considerable and taxing effort. There is a normal inability to leave the home.    Community Physician to provide follow up care: Barbara Wilkins M.D.     Optional Interventions? Yes, Details: Home PT/OT      I certify the face to face encounter for this home health care referral meets the CMS requirements and the encounter/clinical assessment with the patient was, in whole, or in part, for the medical condition(s) listed above, which is the primary reason for home health care. Based on my clinical findings: the service(s) are medically necessary, support the need for home health care, and the homebound criteria are met.  I certify that this patient has had a face to face encounter by myself.  Margarito Land M.D. - NPI: [id number]

## 2020-04-11 NOTE — DISCHARGE PLANNING
MAXIMILIAN spoke with Hui regarding [facility] for [facility], she does not care as long as it's covered by insurance. Pt has SCP so Choice for [facility] was faxed to REBA Lewis and scanned to media tab. Hui given [facility] number for follow up on Monday.

## 2020-04-11 NOTE — RESPIRATORY CARE
COPD EDUCATION by COPD CLINICAL EDUCATOR  4/10/2020  at  12:55PM by Khushbu King, RRT     Patient interviewed by COPD education team.  Patient unable to participate in full program.  Short intervention has been conducted. He completed our inpatient program in 2018. He declined our packet as he already has this information.  He see's [facility] regularly.    COPD Education provided?  Yes- Completed Action Plan in EMR review cleaning equipment    Does patient currently use inhalers/nebulizer at home? Trelegy Nebulizer Duoneb Albuerol Oxygen    Additional medication/equipment recommendations none    Is all Respiratory DME in place? yes                   If yes, has patient been educated on use of DME?   yes    Have all necessary follow up appointments been scheduled?   PCP or D/C clinic pending   Specialty- [facility] 4/22/2020 @ 8264    Has the patient been referred to Pulmonary Rehab?  He was provided the information    Has the patient been referred to the [facility] COPD Program? Yes initiated

## 2020-04-11 NOTE — DISCHARGE PLANNING
Per RN liaison clinically appropriate for HH services. [facility] will still have to wait untill Monday to verify PCP. Referral on hold until Monday.     Thanks,   [facility]

## 2020-04-11 NOTE — THERAPY
"Physical Therapy Evaluation completed.   Bed Mobility:  Supine to Sit: Supervised  Transfers: Sit to Stand: Supervised  Gait: Level Of Assist: Contact Guard Assist with Front-Wheel Walker       Plan of Care: Will benefit from Physical Therapy 3 times per week  Discharge Recommendations: Equipment: Front-Wheel Walker.    Pt is an 86 yo male admitted with COPD exacerbation and PNA. Today, pt is fragile but motivated to move. Pt is supervised for OOB, supervised for transfers. Pt needs contact guard assist for safety for ambulation x 25 feet using FWW with one seated rest to catch breath. Pt reports that family will be home 24/7 to assist. Pt is agreeable to using a FWW at home. Pt will benefit from home health PT. PT will follow if pt is still in house.   Recommend home health transitional care for continued physical therapy services.     See \"Rehab Therapy-Acute\" Patient Summary Report for complete documentation.     "

## 2020-04-11 NOTE — DISCHARGE SUMMARY
Discharge Summary    CHIEF COMPLAINT ON ADMISSION  Chief Complaint   Patient presents with   • Weakness   • Tremors   • Fall     X 3 in the last 24 hours   • Wrist Pain     Right       Reason for Admission  EMS     Admission Date  4/9/2020    CODE STATUS  Full Code    HPI & HOSPITAL COURSE  This is a 85 y.o. male here with shortness of breath.   Mr. Isidro Alvarez is an 85 y.o. male with a history of chronic obstructive pulmonary disease on chronic 2 L at home, dyslipidemia, essential hypertension, pulmonary hypertension who presented 4/9/2020 with increasing shortness of breath at home as well as increased weakness and recent fall x3.  Patient states that he is been having his legs give out on him at home due to weakness.  States that he has been too weak to get up and had to call paramedics.  He is noted to have to increase his supplemental oxygen at home.  He does state having his grandkids over at his house approximately 2 weeks ago and they were sick.  He states that he does go out to the store but his wife goes inside most the time only.  Patient has dry cough denies any fevers, vomiting, diarrhea.  Patient does state recent sore throat proximally for 5 days ago.    He was empirically started on antibiotics and his x-ray findings.  When his procalcitonin came back negative, antibiotics have been stopped.  His COVID-19 is negative though his CT of the chest is quite consistent with this disease process.  He will be discharged home on isolation and quarantine for 2 weeks as was discussed with his family.  He was on 4 L nasal cannula oxygen at home 24 hours a day and will be discharged home on the same.  We did have therapy see him and they recommended outpatient front wheel walker which has been ordered as well as home health with home PT OT which have been ordered.    Therefore, he is discharged in good and stable condition to home with close outpatient follow-up.    The patient met 2-midnight criteria for an  inpatient stay at the time of discharge.    Discharge Date  4/11    FOLLOW UP ITEMS POST DISCHARGE  Home health and home PT/OT    DISCHARGE DIAGNOSES  Principal Problem (Resolved):    Multifocal pneumonia POA: Unknown  Active Problems:    COPD exacerbation (HCC) POA: Yes    Acute on chronic respiratory failure with hypoxia (HCC) POA: Yes    HTN (hypertension) POA: Yes    PAH (pulmonary artery hypertension) (HCC) POA: Yes    Hypomagnesemia POA: Unknown    Atrial fibrillation (HCC) POA: Unknown    HLD (hyperlipidemia) POA: Yes      FOLLOW UP  Future Appointments   Date Time Provider Department Center   4/22/2020  1:45 PM [facility] None   6/25/2020  2:45 PM [facility] None   10/1/2020  1:15 PM [facility] EXAM 9 [facility]     Barbara Wilkins M.D.  [address]  [address] [address]  [place] [address]  [phone]    Schedule an appointment as soon as possible for a visit in 2 weeks  Please call to schedule a hospital follow up with your provider. Thank you!       MEDICATIONS ON DISCHARGE     Medication List      CONTINUE taking these medications      Instructions   atorvastatin 10 MG Tabs  Commonly known as:  LIPITOR   Take 10 mg by mouth every evening.  Dose:  10 mg     Eliquis 2.5mg Tabs  Generic drug:  apixaban   Take 2.5 mg by mouth 2 Times a Day.  Dose:  2.5 mg     Fluticasone-Umeclidin-Vilant 100-62.5-25 MCG/INH Aepb  Commonly known as:  Trelegy Ellipta   Inhale 1 Puff by mouth every day. Rinse mouth after use  Dose:  1 Puff     lisinopril 20 MG Tabs  Commonly known as:  PRINIVIL   Take 20 mg by mouth 2 Times a Day.  Dose:  20 mg     metoprolol 25 MG Tabs  Commonly known as:  LOPRESSOR   Take 25 mg by mouth 2 times a day.  Dose:  25 mg     tamsulosin 0.4 MG capsule  Commonly known as:  FLOMAX   Take 1 Cap by mouth every bedtime.  Dose:  1 Cap     valacyclovir 1 GM Tabs  Commonly known as:  VALTREX   Take 1,000 mg by mouth every day. Scheduled.   Maintenance Medication.  Dose:   1,000 mg            Allergies  No Known Allergies    DIET  Orders Placed This Encounter   Procedures   • Diet Order Regular     Standing Status:   Standing     Number of Occurrences:   1     Order Specific Question:   Diet:     Answer:   Regular [1]       ACTIVITY  isolation    CONSULTATIONS  none    PROCEDURES  none    LABORATORY  Lab Results   Component Value Date    SODIUM 130 (L) 04/10/2020    POTASSIUM 3.9 04/10/2020    CHLORIDE 95 (L) 04/10/2020    CO2 25 04/10/2020    GLUCOSE 103 (H) 04/10/2020    BUN 11 04/10/2020    CREATININE 0.50 04/10/2020    CREATININE 1.1 12/26/2008    procalcitonin negative  Blood culture negative  Lab Results   Component Value Date    WBC 6.8 04/10/2020    HEMOGLOBIN 10.4 (L) 04/10/2020    HEMATOCRIT 32.5 (L) 04/10/2020    PLATELETCT 276 04/10/2020    imaging studies:  CT-CTA CHEST PULMONARY ARTERY W/ RECONS   Final Result      1.  No central or segmental pulmonary embolus   2.  Commonly reported imaging features of COVID-19 pneumonia are present. Other processes such as other infectious pneumonias, drug toxicity or connective tissue disease can cause a similar imaging pattern.   3.  BILATERAL pleural effusions   4.  Enlarged subcarinal and LEFT hilar lymph nodes   5.  8mm RIGHT middle lobe pulmonary nodule   6.  Emphysema   7.  Atherosclerosis      RECOMMENDATION FOR PULMONARY NODULE EVALUATION:   Low Risk: CT at 6-12 months, then consider CT at 18-24 months      High Risk: CT at 6-12 months, then CT at 18-24 months      Low Risk - Minimal or absent history of smoking and of other known risk factors.      High Risk - History of smoking or of other known risk factors.      Note: These recommendations do not apply to lung cancer screening, patients with immunosuppression, or patients with known primary cancer.      Fleischner Society 2017 Guidelines for Management of Incidentally Detected Pulmonary Nodules in Adults      Findings were discussed with Dr. Dior on 4/10/2020 2:13 AM.       CT-HEAD W/O   Final Result      No noncontrast CT evidence of acute intracranial hemorrhage.      Moderate white matter hypodensity is present.  This is a nonspecific finding which usually is found to represent chronic microvascular disease in patient's of this demographic.  Demyelination, age indeterminant ischemia and gliosis are also common    possibilities.      Age-appropriate cerebral volume loss.      DX-CHEST-PORTABLE (1 VIEW)   Final Result      New multifocal consolidation and small left pleural fluid. This is worrisome for pneumonia or aspiration favored over edema      DX-WRIST-COMPLETE 3+ RIGHT   Final Result      Degenerative changes as above described.      Dorsal intercalated segment instability      Chondrocalcinosis.            Total time of the discharge process exceeds 32 minutes.

## 2020-04-11 NOTE — DISCHARGE PLANNING
CM spoke with Hiu the plan is as follows:    Pt will go back to Jessica's house and have own bed and bathroom. His wife Hui will move in with their other daughter, Zoe.    Jessica needs documentation that he needs to be quarantined for 14 days, will speak with Dr Land and will notify RN to call wife with instructions and time to pick pt up.

## 2020-04-11 NOTE — PROGRESS NOTES
Pt discharged home with daughter. AVS with pt. Pt also with all belongings. Pt and family updated and educated about isolation process. Will monitor ans sign off.

## 2020-04-11 NOTE — DISCHARGE PLANNING
Received Choice form at 1367  Agency/Facility Name: [facility]  Referral sent per Choice form @ [id number]

## 2020-04-11 NOTE — DISCHARGE PLANNING
This referral has been escalated to a Clinical Supervisor for review in order to determine Home Health appropriateness.  This issue will be resolved as quickly as possible.    We will be unable to verify PCP until Monday.     For any questions feel free to call us at [phone]  Thank you!    [facility]

## 2020-04-13 ENCOUNTER — PATIENT OUTREACH (OUTPATIENT)
Dept: HEALTH INFORMATION MANAGEMENT | Facility: OTHER | Age: 85
End: 2020-04-13

## 2020-04-13 NOTE — DISCHARGE PLANNING
ATTN: Case Management  RE: Referral for Home Health    As of 4.13.20, we have accepted the Home Health referral for the patient listed above.    A [facility] clinician will be out to see the patient within 48 hours. If you have any questions or concerns regarding the patient's transition to Home Health, please do not hesitate to contact us at [phone].      We look forward to collaborating with you,  [facility] Team

## 2020-04-14 ENCOUNTER — HOSPITAL ENCOUNTER (OUTPATIENT)
Dept: LAB | Facility: MEDICAL CENTER | Age: 85
End: 2020-04-14
Attending: FAMILY MEDICINE
Payer: MEDICARE

## 2020-04-14 ENCOUNTER — HOME CARE VISIT (OUTPATIENT)
Dept: HOME HEALTH SERVICES | Facility: HOME HEALTHCARE | Age: 85
End: 2020-04-14
Payer: MEDICARE

## 2020-04-14 VITALS
DIASTOLIC BLOOD PRESSURE: 56 MMHG | BODY MASS INDEX: 21.66 KG/M2 | HEIGHT: 65 IN | HEART RATE: 74 BPM | RESPIRATION RATE: 20 BRPM | TEMPERATURE: 98.1 F | OXYGEN SATURATION: 95 % | WEIGHT: 130 LBS | SYSTOLIC BLOOD PRESSURE: 110 MMHG

## 2020-04-14 LAB
ALBUMIN SERPL BCP-MCNC: 3.8 G/DL (ref 3.2–4.9)
ALBUMIN/GLOB SERPL: 1.1 G/DL
ALP SERPL-CCNC: 82 U/L (ref 30–99)
ALT SERPL-CCNC: 16 U/L (ref 2–50)
ANION GAP SERPL CALC-SCNC: 12 MMOL/L (ref 7–16)
AST SERPL-CCNC: 20 U/L (ref 12–45)
BACTERIA BLD CULT: NORMAL
BASOPHILS # BLD AUTO: 0.5 % (ref 0–1.8)
BASOPHILS # BLD: 0.04 K/UL (ref 0–0.12)
BILIRUB SERPL-MCNC: 0.6 MG/DL (ref 0.1–1.5)
BUN SERPL-MCNC: 11 MG/DL (ref 8–22)
CALCIUM SERPL-MCNC: 9.2 MG/DL (ref 8.5–10.5)
CHLORIDE SERPL-SCNC: 90 MMOL/L (ref 96–112)
CHOLEST SERPL-MCNC: 156 MG/DL (ref 100–199)
CO2 SERPL-SCNC: 27 MMOL/L (ref 20–33)
CREAT SERPL-MCNC: 0.6 MG/DL (ref 0.5–1.4)
EOSINOPHIL # BLD AUTO: 0.13 K/UL (ref 0–0.51)
EOSINOPHIL NFR BLD: 1.6 % (ref 0–6.9)
ERYTHROCYTE [DISTWIDTH] IN BLOOD BY AUTOMATED COUNT: 51.8 FL (ref 35.9–50)
GLOBULIN SER CALC-MCNC: 3.4 G/DL (ref 1.9–3.5)
GLUCOSE SERPL-MCNC: 98 MG/DL (ref 65–99)
HCT VFR BLD AUTO: 37.6 % (ref 42–52)
HDLC SERPL-MCNC: 69 MG/DL
HGB BLD-MCNC: 12.5 G/DL (ref 14–18)
IMM GRANULOCYTES # BLD AUTO: 0.02 K/UL (ref 0–0.11)
IMM GRANULOCYTES NFR BLD AUTO: 0.2 % (ref 0–0.9)
LDLC SERPL CALC-MCNC: 75 MG/DL
LYMPHOCYTES # BLD AUTO: 1.45 K/UL (ref 1–4.8)
LYMPHOCYTES NFR BLD: 17.6 % (ref 22–41)
MCH RBC QN AUTO: 30.4 PG (ref 27–33)
MCHC RBC AUTO-ENTMCNC: 32.7 G/DL (ref 33.7–35.3)
MCV RBC AUTO: 92.9 FL (ref 81.4–97.8)
MONOCYTES # BLD AUTO: 0.86 K/UL (ref 0–0.85)
MONOCYTES NFR BLD AUTO: 10.4 % (ref 0–13.4)
NEUTROPHILS # BLD AUTO: 5.73 K/UL (ref 1.82–7.42)
NEUTROPHILS NFR BLD: 69.7 % (ref 44–72)
NRBC # BLD AUTO: 0 K/UL
NRBC BLD-RTO: 0 /100 WBC
PLATELET # BLD AUTO: 310 K/UL (ref 164–446)
PMV BLD AUTO: 9.5 FL (ref 9–12.9)
POTASSIUM SERPL-SCNC: 4.6 MMOL/L (ref 3.6–5.5)
PROT SERPL-MCNC: 7.2 G/DL (ref 6–8.2)
RBC # BLD AUTO: 4.11 M/UL (ref 4.7–6.1)
SIGNIFICANT IND 70042: NORMAL
SITE SITE: NORMAL
SODIUM SERPL-SCNC: 129 MMOL/L (ref 135–145)
SOURCE SOURCE: NORMAL
TRIGL SERPL-MCNC: 60 MG/DL (ref 0–149)
TSH SERPL DL<=0.005 MIU/L-ACNC: 5.19 UIU/ML (ref 0.38–5.33)
WBC # BLD AUTO: 8.2 K/UL (ref 4.8–10.8)

## 2020-04-14 PROCEDURE — 80053 COMPREHEN METABOLIC PANEL: CPT

## 2020-04-14 PROCEDURE — 80061 LIPID PANEL: CPT

## 2020-04-14 PROCEDURE — 84443 ASSAY THYROID STIM HORMONE: CPT

## 2020-04-14 PROCEDURE — 665001 SOC-HOME HEALTH

## 2020-04-14 PROCEDURE — 85025 COMPLETE CBC W/AUTO DIFF WBC: CPT

## 2020-04-14 PROCEDURE — 36415 COLL VENOUS BLD VENIPUNCTURE: CPT

## 2020-04-14 PROCEDURE — G0493 RN CARE EA 15 MIN HH/HOSPICE: HCPCS

## 2020-04-14 SDOH — ECONOMIC STABILITY: HOUSING INSECURITY
HOME SAFETY: FIRE ESCAPE PLAN DEVELOPED. PATIENT DOES NOT HAVE FLAMMABLE MATERIALS PRESENT IN THE HOME PRESENTING A FIRE HAZARD. NO EVIDENCE FOUND OF SMOKING MATERIALS PRESENT IN THE HOME.  PT TO EVAL FOR DME.

## 2020-04-14 SDOH — ECONOMIC STABILITY: HOUSING INSECURITY: EVIDENCE OF SMOKING MATERIAL: 0

## 2020-04-14 SDOH — ECONOMIC STABILITY: HOUSING INSECURITY
HOME SAFETY: OXYGEN SAFETY RISK ASSESSMENT PERFORMED. PATIENT DOES HAVE A NO SMOKING SIGN POSTED IN THE HOME. PATIENT DOES HAVE A WORKING FIRE EXTINGUISHER PRESENT IN THE HOME. SMOKE ALARMS ARE PRESENT AND FUNCTIONAL ON EACH LEVEL OF THE HOME. PATIENT DOES HAVE A

## 2020-04-14 ASSESSMENT — PATIENT HEALTH QUESTIONNAIRE - PHQ9
5. POOR APPETITE OR OVEREATING: 0 - NOT AT ALL
2. FEELING DOWN, DEPRESSED, IRRITABLE, OR HOPELESS: 01
1. LITTLE INTEREST OR PLEASURE IN DOING THINGS: 00
SUM OF ALL RESPONSES TO PHQ QUESTIONS 1-9: 5
CLINICAL INTERPRETATION OF PHQ2 SCORE: 1

## 2020-04-14 ASSESSMENT — ENCOUNTER SYMPTOMS
SEVERE DYSPNEA: 1
SHORTNESS OF BREATH: T

## 2020-04-14 ASSESSMENT — FIBROSIS 4 INDEX: FIB4 SCORE: 1.22

## 2020-04-15 ENCOUNTER — HOME CARE VISIT (OUTPATIENT)
Dept: HOME HEALTH SERVICES | Facility: HOME HEALTHCARE | Age: 85
End: 2020-04-15
Payer: MEDICARE

## 2020-04-15 ENCOUNTER — TELEPHONE (OUTPATIENT)
Dept: VASCULAR LAB | Facility: MEDICAL CENTER | Age: 85
End: 2020-04-15

## 2020-04-15 NOTE — PROGRESS NOTES
Hi Dr. Wilkins,  Just a head's up. This patient is under quarrantine for 14 days, and he doesn't seem to understand that means staying home. He said he has an appt with you this week. Just want you to be aware.    Thanks,    Madyson Esquivel, RN supervisor  [facility]

## 2020-04-15 NOTE — TELEPHONE ENCOUNTER
Medications reviewed  No clinically significant interactions          Stacie Mcnamara, Clinical Pharmacist, CDE, CACP

## 2020-04-16 ENCOUNTER — HOME CARE VISIT (OUTPATIENT)
Dept: HOME HEALTH SERVICES | Facility: HOME HEALTHCARE | Age: 85
End: 2020-04-16
Payer: MEDICARE

## 2020-04-16 VITALS — OXYGEN SATURATION: 100 % | TEMPERATURE: 97.9 F | RESPIRATION RATE: 19 BRPM | HEART RATE: 67 BPM

## 2020-04-16 PROCEDURE — G0151 HHCP-SERV OF PT,EA 15 MIN: HCPCS

## 2020-04-16 PROCEDURE — G0152 HHCP-SERV OF OT,EA 15 MIN: HCPCS

## 2020-04-16 SDOH — ECONOMIC STABILITY: HOUSING INSECURITY: EVIDENCE OF SMOKING MATERIAL: 0

## 2020-04-16 SDOH — ECONOMIC STABILITY: HOUSING INSECURITY
HOME SAFETY: DAUGHTER VERBALIZING IMPORTANCE SELF QUARANTINE FOR PATIENT X 2 WEEKS YES, THEY SAID UNTIL 4/25", HAS MASK, IS OFF WORK FOR PROVIDE FULLTIME A FOR PT UNTIL FIRST WEEK OF MAY  PATIENT LIMITED BY POOR MEMORY - DTR REPORTS CHRONIC"

## 2020-04-16 ASSESSMENT — ENCOUNTER SYMPTOMS
DIFFICULTY THINKING: 1
SEVERE DYSPNEA: 1
POOR JUDGMENT: 1

## 2020-04-17 ENCOUNTER — HOME CARE VISIT (OUTPATIENT)
Dept: HOME HEALTH SERVICES | Facility: HOME HEALTHCARE | Age: 85
End: 2020-04-17
Payer: MEDICARE

## 2020-04-17 VITALS
OXYGEN SATURATION: 99 % | SYSTOLIC BLOOD PRESSURE: 120 MMHG | TEMPERATURE: 97.7 F | RESPIRATION RATE: 19 BRPM | HEART RATE: 78 BPM | DIASTOLIC BLOOD PRESSURE: 68 MMHG

## 2020-04-17 VITALS
SYSTOLIC BLOOD PRESSURE: 116 MMHG | TEMPERATURE: 99.2 F | HEART RATE: 65 BPM | OXYGEN SATURATION: 99 % | DIASTOLIC BLOOD PRESSURE: 60 MMHG

## 2020-04-17 PROCEDURE — G0299 HHS/HOSPICE OF RN EA 15 MIN: HCPCS

## 2020-04-17 ASSESSMENT — ACTIVITIES OF DAILY LIVING (ADL)
DRESSING_LB_CURRENT_FUNCTION: INDEPENDENT
BATHING_CURRENT_FUNCTION: MODERATE ASSIST
CURRENT_FUNCTION: SUPERVISION
DRESSING_UB_CURRENT_FUNCTION: INDEPENDENT
TOILETING EQUIPMENT USED: LOW TOILET
TOILETING: 1
GROOMING_WITHIN_DEFINED_LIMITS: 1
CURRENT_FUNCTION: STAND BY ASSIST
PREPARING MEALS: DEPENDENT
AMBULATION ASSISTANCE: 1
AMBULATION ASSISTANCE: SUPERVISION
PHYSICAL TRANSFERS ASSESSED: 1
BATHING ASSESSED: 1
AMBULATION ASSISTANCE: STAND BY ASSIST
FEEDING_WITHIN_DEFINED_LIMITS: 1

## 2020-04-17 ASSESSMENT — ENCOUNTER SYMPTOMS
NAUSEA: DENIES
VOMITING: DENIES
LIMITED RANGE OF MOTION: 1
MUSCLE WEAKNESS: 1

## 2020-04-20 ENCOUNTER — HOME CARE VISIT (OUTPATIENT)
Dept: HOME HEALTH SERVICES | Facility: HOME HEALTHCARE | Age: 85
End: 2020-04-20
Payer: MEDICARE

## 2020-04-20 VITALS
HEART RATE: 71 BPM | RESPIRATION RATE: 18 BRPM | SYSTOLIC BLOOD PRESSURE: 120 MMHG | TEMPERATURE: 98.5 F | DIASTOLIC BLOOD PRESSURE: 64 MMHG | OXYGEN SATURATION: 98 %

## 2020-04-20 PROCEDURE — G0493 RN CARE EA 15 MIN HH/HOSPICE: HCPCS

## 2020-04-20 ASSESSMENT — ENCOUNTER SYMPTOMS
MUSCLE WEAKNESS: 1
VOMITING: DENIES
NAUSEA: DENIES

## 2020-04-20 ASSESSMENT — ACTIVITIES OF DAILY LIVING (ADL)
CURRENT_FUNCTION: STAND BY ASSIST
AMBULATION ASSISTANCE: STAND BY ASSIST

## 2020-04-21 ENCOUNTER — HOME CARE VISIT (OUTPATIENT)
Dept: HOME HEALTH SERVICES | Facility: HOME HEALTHCARE | Age: 85
End: 2020-04-21
Payer: MEDICARE

## 2020-04-21 VITALS
SYSTOLIC BLOOD PRESSURE: 123 MMHG | TEMPERATURE: 98.4 F | DIASTOLIC BLOOD PRESSURE: 64 MMHG | OXYGEN SATURATION: 99 % | RESPIRATION RATE: 17 BRPM | HEART RATE: 67 BPM

## 2020-04-21 PROCEDURE — G0151 HHCP-SERV OF PT,EA 15 MIN: HCPCS

## 2020-04-21 SDOH — ECONOMIC STABILITY: HOUSING INSECURITY: HOME SAFETY: RECOMMEND GARAGE STEP RAIL INSTALLATION - DTR AGREES

## 2020-04-21 SDOH — ECONOMIC STABILITY: HOUSING INSECURITY: EVIDENCE OF SMOKING MATERIAL: 0

## 2020-04-21 ASSESSMENT — ENCOUNTER SYMPTOMS: DIFFICULTY THINKING: 1

## 2020-04-22 ENCOUNTER — HOME CARE VISIT (OUTPATIENT)
Dept: HOME HEALTH SERVICES | Facility: HOME HEALTHCARE | Age: 85
End: 2020-04-22
Payer: MEDICARE

## 2020-04-23 ENCOUNTER — HOME CARE VISIT (OUTPATIENT)
Dept: HOME HEALTH SERVICES | Facility: HOME HEALTHCARE | Age: 85
End: 2020-04-23
Payer: MEDICARE

## 2020-04-23 VITALS
RESPIRATION RATE: 18 BRPM | OXYGEN SATURATION: 98 % | HEART RATE: 68 BPM | SYSTOLIC BLOOD PRESSURE: 128 MMHG | TEMPERATURE: 98.6 F | DIASTOLIC BLOOD PRESSURE: 68 MMHG

## 2020-04-23 PROCEDURE — G0495 RN CARE TRAIN/EDU IN HH: HCPCS

## 2020-04-23 PROCEDURE — G0152 HHCP-SERV OF OT,EA 15 MIN: HCPCS

## 2020-04-23 SDOH — ECONOMIC STABILITY: HOUSING INSECURITY: EVIDENCE OF SMOKING MATERIAL: 0

## 2020-04-23 ASSESSMENT — ENCOUNTER SYMPTOMS
SHORTNESS OF BREATH: T
MUSCLE WEAKNESS: 1
LIMITED RANGE OF MOTION: 1

## 2020-04-23 ASSESSMENT — ACTIVITIES OF DAILY LIVING (ADL): OASIS_M1830: 05

## 2020-04-24 VITALS
SYSTOLIC BLOOD PRESSURE: 128 MMHG | DIASTOLIC BLOOD PRESSURE: 68 MMHG | RESPIRATION RATE: 18 BRPM | OXYGEN SATURATION: 98 % | HEART RATE: 68 BPM | TEMPERATURE: 98.6 F

## 2020-04-24 ASSESSMENT — ENCOUNTER SYMPTOMS: SEVERE DYSPNEA: 1

## 2020-04-27 ENCOUNTER — HOME CARE VISIT (OUTPATIENT)
Dept: HOME HEALTH SERVICES | Facility: HOME HEALTHCARE | Age: 85
End: 2020-04-27
Payer: MEDICARE

## 2020-04-27 ENCOUNTER — HOSPITAL ENCOUNTER (OUTPATIENT)
Facility: MEDICAL CENTER | Age: 85
End: 2020-04-27
Attending: FAMILY MEDICINE
Payer: MEDICARE

## 2020-04-27 VITALS
OXYGEN SATURATION: 100 % | RESPIRATION RATE: 20 BRPM | HEART RATE: 68 BPM | TEMPERATURE: 98.2 F | DIASTOLIC BLOOD PRESSURE: 68 MMHG | SYSTOLIC BLOOD PRESSURE: 118 MMHG

## 2020-04-27 VITALS
SYSTOLIC BLOOD PRESSURE: 118 MMHG | HEART RATE: 68 BPM | RESPIRATION RATE: 20 BRPM | OXYGEN SATURATION: 100 % | TEMPERATURE: 98.2 F | DIASTOLIC BLOOD PRESSURE: 68 MMHG

## 2020-04-27 LAB
ALBUMIN SERPL BCP-MCNC: 3.5 G/DL (ref 3.2–4.9)
ALBUMIN/GLOB SERPL: 1.2 G/DL
ALP SERPL-CCNC: 71 U/L (ref 30–99)
ALT SERPL-CCNC: 13 U/L (ref 2–50)
ANION GAP SERPL CALC-SCNC: 10 MMOL/L (ref 7–16)
AST SERPL-CCNC: 20 U/L (ref 12–45)
BASOPHILS # BLD AUTO: 0.3 % (ref 0–1.8)
BASOPHILS # BLD: 0.02 K/UL (ref 0–0.12)
BILIRUB SERPL-MCNC: 0.3 MG/DL (ref 0.1–1.5)
BUN SERPL-MCNC: 21 MG/DL (ref 8–22)
CALCIUM SERPL-MCNC: 8.8 MG/DL (ref 8.5–10.5)
CHLORIDE SERPL-SCNC: 96 MMOL/L (ref 96–112)
CO2 SERPL-SCNC: 27 MMOL/L (ref 20–33)
CREAT SERPL-MCNC: 0.61 MG/DL (ref 0.5–1.4)
EOSINOPHIL # BLD AUTO: 0.26 K/UL (ref 0–0.51)
EOSINOPHIL NFR BLD: 3.8 % (ref 0–6.9)
ERYTHROCYTE [DISTWIDTH] IN BLOOD BY AUTOMATED COUNT: 58.3 FL (ref 35.9–50)
GLOBULIN SER CALC-MCNC: 2.9 G/DL (ref 1.9–3.5)
GLUCOSE SERPL-MCNC: 90 MG/DL (ref 65–99)
HCT VFR BLD AUTO: 38.2 % (ref 42–52)
HGB BLD-MCNC: 12 G/DL (ref 14–18)
IMM GRANULOCYTES # BLD AUTO: 0.01 K/UL (ref 0–0.11)
IMM GRANULOCYTES NFR BLD AUTO: 0.1 % (ref 0–0.9)
LYMPHOCYTES # BLD AUTO: 2.16 K/UL (ref 1–4.8)
LYMPHOCYTES NFR BLD: 32 % (ref 22–41)
MCH RBC QN AUTO: 30 PG (ref 27–33)
MCHC RBC AUTO-ENTMCNC: 31.4 G/DL (ref 33.7–35.3)
MCV RBC AUTO: 95.5 FL (ref 81.4–97.8)
MONOCYTES # BLD AUTO: 0.85 K/UL (ref 0–0.85)
MONOCYTES NFR BLD AUTO: 12.6 % (ref 0–13.4)
NEUTROPHILS # BLD AUTO: 3.46 K/UL (ref 1.82–7.42)
NEUTROPHILS NFR BLD: 51.2 % (ref 44–72)
NRBC # BLD AUTO: 0 K/UL
NRBC BLD-RTO: 0 /100 WBC
NT-PROBNP SERPL IA-MCNC: 1407 PG/ML (ref 0–125)
PLATELET # BLD AUTO: 259 K/UL (ref 164–446)
PMV BLD AUTO: 10.2 FL (ref 9–12.9)
POTASSIUM SERPL-SCNC: 4.4 MMOL/L (ref 3.6–5.5)
PROT SERPL-MCNC: 6.4 G/DL (ref 6–8.2)
RBC # BLD AUTO: 4 M/UL (ref 4.7–6.1)
SODIUM SERPL-SCNC: 133 MMOL/L (ref 135–145)
WBC # BLD AUTO: 6.8 K/UL (ref 4.8–10.8)

## 2020-04-27 PROCEDURE — 85025 COMPLETE CBC W/AUTO DIFF WBC: CPT

## 2020-04-27 PROCEDURE — G0299 HHS/HOSPICE OF RN EA 15 MIN: HCPCS

## 2020-04-27 PROCEDURE — 80053 COMPREHEN METABOLIC PANEL: CPT

## 2020-04-27 PROCEDURE — 36415 COLL VENOUS BLD VENIPUNCTURE: CPT

## 2020-04-27 PROCEDURE — 83880 ASSAY OF NATRIURETIC PEPTIDE: CPT

## 2020-04-27 PROCEDURE — G0152 HHCP-SERV OF OT,EA 15 MIN: HCPCS

## 2020-04-27 ASSESSMENT — ENCOUNTER SYMPTOMS
VOMITING: DENIES
SEVERE DYSPNEA: 1
NAUSEA: DENIES

## 2020-04-29 ENCOUNTER — HOME CARE VISIT (OUTPATIENT)
Dept: HOME HEALTH SERVICES | Facility: HOME HEALTHCARE | Age: 85
End: 2020-04-29
Payer: MEDICARE

## 2020-04-29 VITALS
DIASTOLIC BLOOD PRESSURE: 59 MMHG | OXYGEN SATURATION: 99 % | SYSTOLIC BLOOD PRESSURE: 123 MMHG | HEART RATE: 67 BPM | RESPIRATION RATE: 17 BRPM | TEMPERATURE: 98.5 F

## 2020-04-29 PROCEDURE — G0151 HHCP-SERV OF PT,EA 15 MIN: HCPCS

## 2020-04-29 SDOH — ECONOMIC STABILITY: HOUSING INSECURITY: EVIDENCE OF SMOKING MATERIAL: 0

## 2020-04-29 SDOH — ECONOMIC STABILITY: HOUSING INSECURITY: HOME SAFETY: DTR EDUCATED RE: 4WW FOLDING FOR CAR XFER PER HER REQUEST

## 2020-05-01 ENCOUNTER — TELEPHONE (OUTPATIENT)
Dept: HEALTH INFORMATION MANAGEMENT | Facility: OTHER | Age: 85
End: 2020-05-01

## 2020-05-01 NOTE — TELEPHONE ENCOUNTER
1. Caller Name: Cassandra from [facility] assisting daughter who is calling on father's behalf.                     Call Back Number: [phone]  [facility] PCP or Specialty Provider: Yes Barbara Wilkins MD        2.  Does patient have any active symptoms of respiratory illness? Yes, the patient reports the following respiratory symptoms: cough. States this cough is not new but due to his COPD.     3.  Does patient have any comoribidities? COPD and CHF    4.  Has the patient traveled in the last 14 days OR had any known contact with someone who is suspected or confirmed to have COVID-19?  No. Just released from hospital on 4/13/20 for possible pneumonia. He tested negative for COVID-19 while in hospital.     5. Disposition: Cleared by RN Triage as potential is low for COVID-19; OK to keep/schedule appointment. Tested negative for COVID-19 in hospital. Patient is due for follow-up chest x-ray. States he is recovering; cough is not new and due to his COPD. Patient cleared by RN and provider on-call [place] [facility]     Note routed to [facility] Provider: NATALIE only.

## 2020-05-02 ENCOUNTER — HOSPITAL ENCOUNTER (OUTPATIENT)
Dept: RADIOLOGY | Facility: MEDICAL CENTER | Age: 85
End: 2020-05-02
Attending: FAMILY MEDICINE
Payer: MEDICARE

## 2020-05-02 DIAGNOSIS — Z87.01 PERSONAL HISTORY OF PNEUMONIA (RECURRENT): ICD-10-CM

## 2020-05-02 DIAGNOSIS — I50.9 HEART FAILURE, UNSPECIFIED HF CHRONICITY, UNSPECIFIED HEART FAILURE TYPE (HCC): ICD-10-CM

## 2020-05-02 PROCEDURE — 71046 X-RAY EXAM CHEST 2 VIEWS: CPT

## 2020-05-04 ENCOUNTER — HOME CARE VISIT (OUTPATIENT)
Dept: HOME HEALTH SERVICES | Facility: HOME HEALTHCARE | Age: 85
End: 2020-05-04
Payer: MEDICARE

## 2020-05-04 VITALS
DIASTOLIC BLOOD PRESSURE: 82 MMHG | TEMPERATURE: 97.3 F | SYSTOLIC BLOOD PRESSURE: 118 MMHG | OXYGEN SATURATION: 99 % | HEART RATE: 71 BPM | RESPIRATION RATE: 18 BRPM

## 2020-05-04 PROCEDURE — G0152 HHCP-SERV OF OT,EA 15 MIN: HCPCS

## 2020-05-05 ENCOUNTER — HOME CARE VISIT (OUTPATIENT)
Dept: HOME HEALTH SERVICES | Facility: HOME HEALTHCARE | Age: 85
End: 2020-05-05
Payer: MEDICARE

## 2020-05-05 VITALS
OXYGEN SATURATION: 99 % | DIASTOLIC BLOOD PRESSURE: 60 MMHG | HEART RATE: 63 BPM | TEMPERATURE: 98.3 F | RESPIRATION RATE: 18 BRPM | SYSTOLIC BLOOD PRESSURE: 119 MMHG

## 2020-05-05 PROCEDURE — G0151 HHCP-SERV OF PT,EA 15 MIN: HCPCS

## 2020-05-05 SDOH — ECONOMIC STABILITY: HOUSING INSECURITY: HOME SAFETY: MEMORY AND SAFETY JUDGEMENT SUPPORT NEEDS MET VIA FAMILY

## 2020-05-05 SDOH — ECONOMIC STABILITY: HOUSING INSECURITY: EVIDENCE OF SMOKING MATERIAL: 0

## 2020-05-06 ENCOUNTER — OFFICE VISIT (OUTPATIENT)
Dept: PULMONOLOGY | Facility: HOSPICE | Age: 85
End: 2020-05-06
Payer: MEDICARE

## 2020-05-06 VITALS
RESPIRATION RATE: 16 BRPM | HEART RATE: 75 BPM | SYSTOLIC BLOOD PRESSURE: 124 MMHG | WEIGHT: 130 LBS | HEIGHT: 65 IN | BODY MASS INDEX: 21.66 KG/M2 | DIASTOLIC BLOOD PRESSURE: 76 MMHG | OXYGEN SATURATION: 94 %

## 2020-05-06 DIAGNOSIS — J43.2 CENTRILOBULAR EMPHYSEMA (HCC): ICD-10-CM

## 2020-05-06 DIAGNOSIS — R91.8 PULMONARY NODULES: ICD-10-CM

## 2020-05-06 DIAGNOSIS — Z87.01 HISTORY OF PNEUMONIA: ICD-10-CM

## 2020-05-06 DIAGNOSIS — I27.21 PAH (PULMONARY ARTERY HYPERTENSION) (HCC): ICD-10-CM

## 2020-05-06 DIAGNOSIS — J96.11 CHRONIC RESPIRATORY FAILURE WITH HYPOXIA (HCC): ICD-10-CM

## 2020-05-06 PROCEDURE — 99214 OFFICE O/P EST MOD 30 MIN: CPT | Performed by: PHYSICIAN ASSISTANT

## 2020-05-06 ASSESSMENT — ENCOUNTER SYMPTOMS
SINUS PAIN: 0
WEIGHT LOSS: 0
CHILLS: 0
TREMORS: 0
HEADACHES: 0
PALPITATIONS: 0
ROS SKIN COMMENTS: SHINGLES
FEVER: 0
INSOMNIA: 0
SORE THROAT: 0
DIZZINESS: 0
HEARTBURN: 0
ROS GI COMMENTS: UPPER DENTURES, NO DIFFICULTY SWALLOWING
EYES NEGATIVE: 1
SPUTUM PRODUCTION: 1
SHORTNESS OF BREATH: 1
COUGH: 1
ORTHOPNEA: 0

## 2020-05-06 ASSESSMENT — FIBROSIS 4 INDEX: FIB4 SCORE: 1.82

## 2020-05-06 NOTE — PATIENT INSTRUCTIONS
1-doing well at current time  2-uses albuterol 2-3 x per week  3-remains on 4L oxygen  4-practicing social distancing  5-follow up in 4 months, sooner if needed

## 2020-05-06 NOTE — PROGRESS NOTES
CC: Reports he recently had shingles    HPI:  Garrett Alvarez is a 85 y.o. year old male here today for follow-up on COPD, chronic respiratory failure with hypoxia. Last seen in clinic 2/20/2020.  Patient is a former smoker with reported quit date in 2008 and 54-pack-year history.  Continued abstinence advised.    Pertinent past medical history includes recent hospitalization for shortness of breath, multifocal pneumonia, increasing weakness in his legs with ground-level fall x3 in home.  Also pulmonary hypertension, centrilobular emphysema, sepsis, abnormal EKG, chronic respiratory failure on O2, bilateral hydronephrosis, STEMI.      Reviewed in clinic vitals including blood pressure of 124/76, heart rate of 75, O2 sat of 94% on 4 L of oxygen and BMI of 21.63 kg/m².    Reviewed home medication regimen which includes 4 L of oxygen around-the-clock, previously on 3 L.  Other meds include albuterol nebulizer and inhaler, he reports using the inhaler 2-3 times per week was using nebulizer 2-3 times per day at last appointment, Eliquis, Trelegy.    Reviewed most recent imaging including chest x-ray obtained 5/2/2020 demonstrating changes consistent with COPD/emphysema, slight improvement in chronic interstitial opacities without interval change in the upper lobes, slight improvement in the linear opacities in both lower lobes, slightly elevated left hemidiaphragm.     CTA obtained 4/9/2020 demonstrated no central or segmental pulmonary embolus, commonly reported imaging features of COVID-19 are present, bilateral pleural effusions, enlarged subcarinal and left hilar lymph nodes, 8 mm right middle lobe pulmonary nodule, emphysema.  Follow-up CT recommended.    CTA obtained 6/30/2018 demonstrated no PE, severe emphysema, bilateral hydronephrosis upper kidneys, atherosclerosis with coronary artery calcifications, pancreatic calcifications consistent with history of chronic pancreatitis.    Echocardiogram obtained  10/31/2019 demonstrated normal left ventricular chamber size, wall thickness and systolic function, LVEF estimated 55%, indeterminant diastolic function, normal right ventricular size and systolic function, enlarged right atrium, moderately dilated left atrium, worsening mitral regurgitation-moderate, moderate tricuspid regurgitation with estimated RVSP of 60 mmHg.        Review of Systems   Constitutional: Positive for malaise/fatigue (mild ). Negative for chills, fever and weight loss.   HENT: Positive for hearing loss. Negative for congestion, nosebleeds, sinus pain, sore throat and tinnitus.    Eyes: Negative.    Respiratory: Positive for cough (occasional ), sputum production (clear occasional ) and shortness of breath (with activity ). Wheezing: occasionally     Cardiovascular: Negative for chest pain, palpitations, orthopnea and leg swelling.   Gastrointestinal: Negative for heartburn.        Upper dentures, no difficulty swallowing    Skin:        Shingles    Neurological: Negative for dizziness, tremors and headaches.   Psychiatric/Behavioral: The patient does not have insomnia.        Past Medical History:   Diagnosis Date   • Centrilobular emphysema (HCC) 12/3/2015   • COPD (chronic obstructive pulmonary disease) (HCC)    • Dyslipidemia    • Hypertension    • Pulmonary emphysema (HCC)    • Pulmonary hypertension (HCC)        Past Surgical History:   Procedure Laterality Date   • HERNIA REPAIR  1990    right inguinal hernia        Family History   Problem Relation Age of Onset   • Heart Disease Father    • Heart Attack Brother    • Other Brother         CAD   • Cancer Brother         lung, other       Social History     Socioeconomic History   • Marital status:      Spouse name: Not on file   • Number of children: Not on file   • Years of education: Not on file   • Highest education level: Not on file   Occupational History   • Not on file   Social Needs   • Financial resource strain: Not on file  "  • Food insecurity     Worry: Not on file     Inability: Not on file   • Transportation needs     Medical: Not on file     Non-medical: Not on file   Tobacco Use   • Smoking status: Former Smoker     Packs/day: 1.00     Years: 54.00     Pack years: 54.00     Start date:      Last attempt to quit: 2008     Years since quittin.3   • Smokeless tobacco: Never Used   • Tobacco comment: Continued abstinence advised   Substance and Sexual Activity   • Alcohol use: Yes     Alcohol/week: 1.0 oz     Types: 2 Cans of beer per week     Comment: week   • Drug use: No   • Sexual activity: Yes     Partners: Female   Lifestyle   • Physical activity     Days per week: Not on file     Minutes per session: Not on file   • Stress: Not on file   Relationships   • Social connections     Talks on phone: Not on file     Gets together: Not on file     Attends Hinduism service: Not on file     Active member of club or organization: Not on file     Attends meetings of clubs or organizations: Not on file     Relationship status: Not on file   • Intimate partner violence     Fear of current or ex partner: Not on file     Emotionally abused: Not on file     Physically abused: Not on file     Forced sexual activity: Not on file   Other Topics Concern   • Not on file   Social History Narrative   • Not on file       Allergies as of 2020   • (No Known Allergies)        @Vital signs for this encounter:  Vitals:    20 1316   Height: 1.651 m (5' 5\")   Weight: 59 kg (130 lb)   Weight % change since last entry.: 0 %   BP: 124/76   Pulse: 75   BMI (Calculated): 21.63   Resp: 16       Current medications as of today   Current Outpatient Medications   Medication Sig Dispense Refill   • albuterol 108 (90 Base) MCG/ACT Aero Soln inhalation aerosol Inhale 1 Puff by mouth every four hours as needed for Shortness of Breath.     • albuterol (PROVENTIL) 2.5mg/3ml Nebu Soln solution for nebulization 2.5 mg by Nebulization route every four " hours as needed for Shortness of Breath.     • apixaban (ELIQUIS) 2.5mg Tab Take 2.5 mg by mouth 2 Times a Day.     • atorvastatin (LIPITOR) 10 MG Tab Take 10 mg by mouth every evening.     • lisinopril (PRINIVIL) 20 MG Tab Take 20 mg by mouth 2 Times a Day.     • metoprolol (LOPRESSOR) 25 MG Tab Take 25 mg by mouth 2 times a day.     • valacyclovir (VALTREX) 1 GM Tab Take 1,000 mg by mouth every day. Scheduled.   Maintenance Medication.     • Fluticasone-Umeclidin-Vilant (TRELEGY ELLIPTA) 100-62.5-25 MCG/INH AEROSOL POWDER, BREATH ACTIVATED Inhale 1 Puff by mouth every day. Rinse mouth after use 1 Each 11   • tamsulosin (FLOMAX) 0.4 MG capsule Take 1 Cap by mouth every bedtime. 90 Cap 3   • Home Care Oxygen Inhale 4 L/min by mouth Continuous. Oxygen dose range: 4 L/min  Respiratory route via: Nasal Cannula   Oxygen supplier: [facility]       No current facility-administered medications for this visit.          Physical Exam:   Gen:           Alert and oriented, No apparent distress. Mood and affect     appropriate, normal interaction with provider.  Eyes:          sclere white, conjunctive moist.  Hearing:     Grossly intact.  Dentition:    Upper dentures, fair lower dentition  Oropharynx:   Tongue normal, posterior pharynx without erythema or exudate.  Neck:        Supple, trachea midline, no masses.  Respiratory Effort: No intercostal retractions or use of accessory muscles.   Lung Auscultation:      Diminished; no rales, rhonchi or wheezing.  CV:            Regular rate and rhythm. No edema. No murmurs, rubs or gallops.  Digits, Nails, Ext: No clubbing, cyanosis, petechiae, or nodes.   Skin:        No rashes, lesions or ulcers noted on back or exposed skin    surfaces.                     Assessment:  1. Pulmonary nodules  CT-CHEST (THORAX) W/O   2. Centrilobular emphysema (HCC)   no change in home regimen   3. PAH (pulmonary artery hypertension) (HCC)   continue O2 at 3 to 4 L   4. Chronic respiratory failure  with hypoxia (HCC)   continue O2 at 3 to 4 L   5. History of pneumonia   follow-up x-ray showed improvement       Immunizations:    Flu: 9/26/2019  Pneumovax 23: 4/18/2019   Prevnar 13: 10/21/2014    Plan:  Patient is an 85-year-old male with history of emphysema, chronic respiratory failure with hypoxia, pulmonary artery hypertension.  Recent admission for multifocal pneumonia, hospital records reviewed.  See chest x-ray, per radiology suspicious for COVID-19.  Patient was tested for COVID-19 and negative.  CTA obtained during admission demonstrated 8 mm nodule right middle lobe not present on imaging in 2018, follow-up CT recommended.  On O2 at 4 L.    1-reports doing well at current time, getting stronger with PT and OT  2-uses albuterol 2-3 x per week  3-remains on 4L oxygen  4-practicing social distancing  5-follow up in 4 months, sooner if needed     This dictation was created using voice recognition software. The accuracy of the dictation is limited to the abilities of the software. I expect there may be some errors of grammar and possibly content.

## 2020-05-07 ENCOUNTER — HOME CARE VISIT (OUTPATIENT)
Dept: HOME HEALTH SERVICES | Facility: HOME HEALTHCARE | Age: 85
End: 2020-05-07
Payer: MEDICARE

## 2020-05-07 PROBLEM — Z87.01 HISTORY OF PNEUMONIA: Status: ACTIVE | Noted: 2020-05-07

## 2020-05-07 PROBLEM — R91.8 PULMONARY NODULES: Status: ACTIVE | Noted: 2020-05-07

## 2020-05-07 PROBLEM — J96.11 CHRONIC RESPIRATORY FAILURE WITH HYPOXIA (HCC): Status: ACTIVE | Noted: 2020-05-07

## 2020-05-07 PROCEDURE — G0493 RN CARE EA 15 MIN HH/HOSPICE: HCPCS

## 2020-05-10 VITALS
DIASTOLIC BLOOD PRESSURE: 68 MMHG | HEART RATE: 70 BPM | SYSTOLIC BLOOD PRESSURE: 130 MMHG | OXYGEN SATURATION: 99 % | RESPIRATION RATE: 18 BRPM | TEMPERATURE: 97.8 F

## 2020-05-10 SDOH — ECONOMIC STABILITY: HOUSING INSECURITY: EVIDENCE OF SMOKING MATERIAL: 0

## 2020-05-10 ASSESSMENT — PATIENT HEALTH QUESTIONNAIRE - PHQ9: CLINICAL INTERPRETATION OF PHQ2 SCORE: 0

## 2020-05-10 ASSESSMENT — ACTIVITIES OF DAILY LIVING (ADL)
OASIS_M1830: 02
HOME_HEALTH_OASIS: 01

## 2020-05-11 ENCOUNTER — PATIENT OUTREACH (OUTPATIENT)
Dept: HEALTH INFORMATION MANAGEMENT | Facility: OTHER | Age: 85
End: 2020-05-11

## 2020-05-11 NOTE — PROGRESS NOTES
An 85-year-old male was an emergent admission to [facility] from 4/9/2020 to 4/11/2020 to treat Pneumonia, unspecified organism. The Patient Navigator visited the patient bedside. The patient was discharged Home with Home Health.     The patient was not discharged with any medication orders.     The patient was ordered to follow-up with Specialist, PCP, and Home Health. The patient had the following appointments:     1) 4/14/2020 @ 2:00 Home Health - Appointment Kept     2) 4/16/2020 @ 2:30 Barbara Wilkins telemedicine - CONFIRMED AS KEPT     3) 4/22/2020 @ 1:45 Antionette Rooney physician assistant - PHYSICIAN OFFICE RESCHEDULED     4) 5/6/2020 @ 1:15 Antionette Rooney physician assistant - CONFIRMED AS KEPT     5)  Primary Care Physician, general/family practice - PATIENT DECLINED APPOINTMENT    The patient has a future appointment scheduled for   6/25/2020 @ 2:45 Antionette Rooney physician assistant - SCHEDULED.      The Patient Navigator followed the patient for a total of 31 days and Patient  was receptive to services. In summary, the Patient Navigator Provided education to patient (health edu, benefits, resources, etc.). As a result, Patient adhered with Discharge Orders, Patient attended follow up appointments, and Patient successfully recovered.

## 2020-05-18 ENCOUNTER — HOSPITAL ENCOUNTER (OUTPATIENT)
Dept: RADIOLOGY | Facility: MEDICAL CENTER | Age: 85
End: 2020-05-18
Attending: FAMILY MEDICINE
Payer: MEDICARE

## 2020-05-18 DIAGNOSIS — L03.011 CELLULITIS OF RIGHT THUMB: ICD-10-CM

## 2020-05-18 PROCEDURE — 73130 X-RAY EXAM OF HAND: CPT | Mod: RT

## 2020-05-18 PROCEDURE — 73110 X-RAY EXAM OF WRIST: CPT | Mod: RT

## 2020-05-25 DIAGNOSIS — E78.5 HYPERLIPIDEMIA, UNSPECIFIED HYPERLIPIDEMIA TYPE: Primary | ICD-10-CM

## 2020-05-26 RX ORDER — ATORVASTATIN CALCIUM 10 MG/1
TABLET, FILM COATED ORAL
Qty: 100 TAB | Refills: 2 | Status: SHIPPED | OUTPATIENT
Start: 2020-05-26 | End: 2021-02-26

## 2020-07-13 NOTE — TELEPHONE ENCOUNTER
Order faxed to [facility] EMERGENCY DEPT  eMERGENCYdEPARTMENT eNCOUnter      Pt Name: Hamzah August  MRN: [id number]  [place] 1989  Date of evaluation: 7/13/2020  Provider:LEEANNA El    CHIEF COMPLAINT       Chief Complaint   Patient presents with    Knee Pain     L knee pain         HISTORY OF PRESENT ILLNESS  (Location/Symptom, Timing/Onset, Context/Setting, Quality, Duration, Modifying Factors, Severity.)   Hamzah August is a 32 y.o. female who presents to the emergency department with complaints of left knee pain and swelling for 2 months she has no dvt hx. No ankle or calf pain. The pain is mainly behind her knee. She denies hip pain. Has good pulses no numbness tingling. No back pain. She has trying to meds for symptomatic relief. HPI    Nursing Notes were reviewed and I agree. REVIEW OF SYSTEMS    (2-9 systems for level 4, 10 or more for level 5)     Review of Systems   Constitutional: Negative for activity change, appetite change, chills and fever. HENT: Negative for congestion, postnasal drip, rhinorrhea and sore throat. Eyes: Negative for photophobia, pain, discharge and visual disturbance. Respiratory: Negative for apnea, cough and shortness of breath. Cardiovascular: Negative for chest pain and leg swelling. Gastrointestinal: Negative for abdominal distention, abdominal pain and nausea. Genitourinary: Negative for vaginal bleeding. Musculoskeletal: Positive for arthralgias, gait problem and joint swelling. Negative for back pain, neck pain and neck stiffness. Skin: Negative for color change and rash. Neurological: Negative for dizziness, syncope, facial asymmetry and headaches. Hematological: Negative for adenopathy. Does not bruise/bleed easily. Psychiatric/Behavioral: Negative for agitation, behavioral problems and confusion. Except as noted above the remainder of the review of systems was reviewed and negative.        PAST MEDICAL HISTORY     Past Medical History: Diagnosis Date    Irregular menses          SURGICAL HISTORY       Past Surgical History:   Procedure Laterality Date    ABDOMINOPLASTY       SECTION      x3    DILATION AND CURETTAGE OF UTERUS N/A 2019    DILATATION AND CURETTAGE SUCTION ULTRASOUND GUIDED performed by Sandro Copeland MD at [facility] N/A 2020    DIAGNOSTIC LAPAROSCOPY WITH TREATMENT OF ECTOPIC PREGNANCY performed by Rico Soliz MD at [address] N/A 2018    LAPAROSCOPY DIAGNOSTIC, LYSIS OF ADHESIONS performed by Rico Soliz MD at [address] ANASTOMOSIS N/A 2018    TUBAL REANASTOMOSIS performed by Rico Soliz MD at [address] [facility]       Discharge Medication List as of 2020  4:13 PM      CONTINUE these medications which have NOT CHANGED    Details   ibuprofen (ADVIL;MOTRIN) 800 MG tablet Take 1 tablet by mouth every 8 hours as needed for Pain, Disp-20 tablet, R-0Print      progesterone (PROMETRIUM) 200 MG capsule Take 1 capsule by mouth nightly, Disp-30 capsule, R-3Normal      cetirizine (ZYRTEC) 10 MG tablet Take 1 tablet by mouth daily, Disp-10 tablet, R-0Normal      fluticasone (FLONASE) 50 MCG/ACT nasal spray 1 spray by Nasal route daily for 10 days, Disp-1 Bottle, R-0Normal      promethazine (PHENERGAN) 6.25 MG/5ML syrup Take 10 mLs by mouth 2 times daily as needed for Nausea, Disp-60 mL, [id number]      folic acid (FOLVITE) 1 MG tablet Take 1 tablet by mouth daily, Disp-90 tablet, R-1Normal             ALLERGIES     Latex;  Iodine; and Ondansetron hcl    FAMILY HISTORY       Family History   Problem Relation Age of Onset    Heart Disease Mother           SOCIAL HISTORY       Social History     Socioeconomic History    Marital status: Single     Spouse name: None    Number of children: None    Years of education: None    Highest education level: None   Occupational History    None   Social Needs    Financial resource strain: None    Food insecurity     Worry: None     Inability: None    Transportation needs     Medical: None     Non-medical: None   Tobacco Use    Smoking status: Current Some Day Smoker     Types: Cigarettes    Smokeless tobacco: Never Used   Substance and Sexual Activity    Alcohol use: No     Alcohol/week: 0.0 standard drinks    Drug use: No    Sexual activity: Yes     Partners: Male   Lifestyle    Physical activity     Days per week: None     Minutes per session: None    Stress: None   Relationships    Social connections     Talks on phone: None     Gets together: None     Attends Denominational service: None     Active member of club or organization: None     Attends meetings of clubs or organizations: None     Relationship status: None    Intimate partner violence     Fear of current or ex partner: None     Emotionally abused: None     Physically abused: None     Forced sexual activity: None   Other Topics Concern    None   Social History Narrative    None       SCREENINGS    [place] Coma Scale  Eye Opening: Spontaneous  Best Verbal Response: Oriented  Best Motor Response: Obeys commands  [place] Coma Scale Score: 15      PHYSICAL EXAM    (up to 7 forlevel 4, 8 or more for level 5)     ED Triage Vitals [07/13/20 1510]   BP Temp Temp Source Pulse Resp SpO2 Height Weight   (!) 129/91 98.7 °F (37.1 °C) Temporal 78 20 99 % -- 228 lb (103.4 kg)       Physical Exam  Vitals signs and nursing note reviewed. Constitutional:       General: She is not in acute distress. Appearance: Normal appearance. She is well-developed. She is obese. She is not diaphoretic. HENT:      Head: Normocephalic and atraumatic. Right Ear: Tympanic membrane, ear canal and external ear normal.      Left Ear: Tympanic membrane, ear canal and external ear normal.      Mouth/Throat:      Pharynx: No oropharyngeal exudate. Eyes:      General:         Right eye: No discharge. Left eye: No discharge. Pupils: Pupils are equal, round, and reactive to light. Neck:      Musculoskeletal: Normal range of motion and neck supple. Thyroid: No thyromegaly. Cardiovascular:      Rate and Rhythm: Normal rate and regular rhythm. Pulses: Normal pulses. Heart sounds: Normal heart sounds. No murmur. No friction rub. Pulmonary:      Effort: Pulmonary effort is normal. No respiratory distress. Breath sounds: Normal breath sounds. No stridor. No wheezing. Abdominal:      General: Bowel sounds are normal. There is no distension. Palpations: Abdomen is soft. Tenderness: There is no abdominal tenderness. Musculoskeletal:         General: Tenderness present. No signs of injury. Comments: Appreciate mild effusion no excessive warmth or color change. No concern at this time for septic joint. Limited ROM due to pain has positive robyn test.    Skin:     General: Skin is warm and dry. Capillary Refill: Capillary refill takes less than 2 seconds. Findings: No rash. Neurological:      General: No focal deficit present. Mental Status: She is alert and oriented to person, place, and time. Mental status is at baseline. Cranial Nerves: No cranial nerve deficit. Sensory: No sensory deficit. Coordination: Coordination normal.   Psychiatric:         Mood and Affect: Mood normal.         Behavior: Behavior normal.         Thought Content: Thought content normal.         Judgment: Judgment normal.           DIAGNOSTIC RESULTS     RADIOLOGY:   Non-plain film images such as CT, Ultrasound and MRI are read by the radiologist. Plain radiographic images are visualized and preliminarilyinterpreted by No att. providers found with the below findings:    Interpretation per the Radiologist below, if available at the time of this note:    XR KNEE LEFT (1-2 VIEWS)   Final Result   No acute bony abnormality appreciated.    Signed by Dr Anna Citnron on 7/13/2020 3:58 PM LABS:  Labs Reviewed - No data to display    All other labs were within normal range or notreturned as of this dictation. RE-ASSESSMENT        EMERGENCY DEPARTMENT COURSE and DIFFERENTIAL DIAGNOSIS/MDM:   Vitals:    Vitals:    07/13/20 1510 07/13/20 1543   BP: (!) 129/91    Pulse: 78 72   Resp: 20    Temp: 98.7 °F (37.1 °C)    TempSrc: Temporal    SpO2: 99%    Weight: 228 lb (103.4 kg)          MDM  Benign x-ray the knee is stable on exam.  There is no significant effusion. It is possible she might have done something to her meniscus at this time without any other swelling or symptoms that would even remotely support a DVT concern I did not feel ultrasound indicated. I advised that should anything worsen to return to ER and educate that we did not do ultrasound for said reasons which she states understanding and does not want me to do ultrasound right now she like to follow with orthopedics I anticipate them wanting to do an MRI we will start her on some basic anti-inflammatories and steroids and educated on compression wrapping and rice pneumonic. PROCEDURES:    Procedures      FINAL IMPRESSION      1.  Acute pain of left knee          DISPOSITION/PLAN   DISPOSITION Decision To Discharge 07/13/2020 04:05:11 PM      PATIENT REFERRED TO:  [address] [place] [address] EMERGENCY DEPT  [facility]  [phone]    If symptoms worsen    Gilda Bhat MD  [place] [address]  [phone]    Schedule an appointment as soon as possible for a visit         DISCHARGE MEDICATIONS:  Discharge Medication List as of 7/13/2020  4:13 PM      START taking these medications    Details   methylPREDNISolone (MEDROL, OZ,) 4 MG tablet Take by mouth., Disp-1 kit,R-0Print             (Please note that portions of this note were completed with a voice recognition program.  Efforts were made to edit the dictations but occasionallywords are mis-transcribed.)    LEEANNA Loera, PA  07/14/20 0616

## 2020-09-11 ENCOUNTER — OFFICE VISIT (OUTPATIENT)
Dept: PULMONOLOGY | Facility: HOSPICE | Age: 85
End: 2020-09-11
Payer: MEDICARE

## 2020-09-11 VITALS
SYSTOLIC BLOOD PRESSURE: 110 MMHG | HEIGHT: 67 IN | RESPIRATION RATE: 18 BRPM | BODY MASS INDEX: 21.94 KG/M2 | OXYGEN SATURATION: 94 % | WEIGHT: 139.8 LBS | DIASTOLIC BLOOD PRESSURE: 72 MMHG | HEART RATE: 73 BPM

## 2020-09-11 DIAGNOSIS — J34.89 RHINORRHEA: ICD-10-CM

## 2020-09-11 DIAGNOSIS — J44.9 CHRONIC OBSTRUCTIVE PULMONARY DISEASE, UNSPECIFIED COPD TYPE (HCC): ICD-10-CM

## 2020-09-11 DIAGNOSIS — J43.2 CENTRILOBULAR EMPHYSEMA (HCC): ICD-10-CM

## 2020-09-11 DIAGNOSIS — R05.9 COUGH: ICD-10-CM

## 2020-09-11 DIAGNOSIS — I27.21 PAH (PULMONARY ARTERY HYPERTENSION) (HCC): ICD-10-CM

## 2020-09-11 DIAGNOSIS — J96.11 CHRONIC RESPIRATORY FAILURE WITH HYPOXIA (HCC): ICD-10-CM

## 2020-09-11 PROCEDURE — 99214 OFFICE O/P EST MOD 30 MIN: CPT | Performed by: PHYSICIAN ASSISTANT

## 2020-09-11 RX ORDER — CEPHALEXIN 500 MG/1
CAPSULE ORAL
COMMUNITY
End: 2021-03-12

## 2020-09-11 RX ORDER — CEPHALEXIN 500 MG/1
TABLET ORAL
COMMUNITY
End: 2021-03-12

## 2020-09-11 RX ORDER — CEFDINIR 300 MG/1
CAPSULE ORAL
COMMUNITY
End: 2021-03-12

## 2020-09-11 RX ORDER — ALBUTEROL SULFATE 2.5 MG/3ML
SOLUTION RESPIRATORY (INHALATION)
COMMUNITY

## 2020-09-11 RX ORDER — IPRATROPIUM BROMIDE 21 UG/1
1-2 SPRAY, METERED NASAL 3 TIMES DAILY PRN
Qty: 30 ML | Refills: 1 | Status: SHIPPED
Start: 2020-09-11 | End: 2021-04-09

## 2020-09-11 RX ORDER — ALBUTEROL SULFATE 90 UG/1
AEROSOL, METERED RESPIRATORY (INHALATION)
COMMUNITY
End: 2021-03-12

## 2020-09-11 RX ORDER — NYSTATIN 100000 [USP'U]/G
POWDER TOPICAL
COMMUNITY
Start: 2020-06-29 | End: 2021-03-12

## 2020-09-11 RX ORDER — NYSTATIN 100000 [USP'U]/G
POWDER TOPICAL
COMMUNITY
End: 2021-04-09

## 2020-09-11 ASSESSMENT — ENCOUNTER SYMPTOMS
WEIGHT LOSS: 0
DIZZINESS: 0
ROS GI COMMENTS: UPPER DENTURES, NO DIFFICULTY SWALLOWING
FEVER: 0
HEADACHES: 0
ORTHOPNEA: 0
SPUTUM PRODUCTION: 0
TREMORS: 0
HEARTBURN: 0
COUGH: 1
WHEEZING: 0
EYES NEGATIVE: 1
SORE THROAT: 0
SHORTNESS OF BREATH: 1
CHILLS: 0
SINUS PAIN: 0
PALPITATIONS: 0
INSOMNIA: 0

## 2020-09-11 ASSESSMENT — FIBROSIS 4 INDEX: FIB4 SCORE: 1.84

## 2020-09-11 NOTE — PATIENT INSTRUCTIONS
1-constant runny nose, trial atrovent nasal spray  2-no other changes in routine  3-covid 19 recommendations:  Wear mask in public, social distancing, frequent handwashing, early flu shot   4-continue oxygen at 3 to 3.5L   5-follow up in 6 months

## 2020-09-11 NOTE — PROGRESS NOTES
CC: Mild increase cough and shortness of breath with activity with bad air    HPI:  Garrett Alvarez is a 86 y.o. year old male here today for follow-up on COPD and chronic respiratory failure with hypoxia. Last seen in clinic 5/6/2020.  Patient is a former smoker with reported 54-pack-year history and quit date in 2008.  Continued abstinence advised.    Pertinent past medical history includes April 9, 2020 hospitalization for shortness of breath, multifocal pneumonia, increasing weakness in his legs with ground-level fall x3 in home.    Past medical history also includes atrial fibrillation, pulmonary hypertension, centrilobular emphysema, sepsis, abnormal EKG, chronic respiratory failure on O2, bilateral hydronephrosis, STEMI.      Reviewed in clinic vitals including blood pressure of 110/72, heart rate of 73, O2 sat of 94% on room air and BMI of 21.9 kg/m².    Reviewed home medication regimen including albuterol inhaler which he uses 2-3 times per week, albuterol nebulizer which he reports using very occasionally, Trelegy, oxygen at 3 L, 3.5 L pulsed, Eliquis.  Patient complaining of constant runny nose which is especially annoying given wearing a mask.    Reviewed most recent imaging including chest x-ray obtained 5/2/2020 demonstrating changes consistent with COPD/emphysema, slight improvement in chronic interstitial opacities without interval change in the upper lobes, slight improvement in the linear opacities in both lower lobes, slightly elevated left hemidiaphragm.      CTA obtained 4/9/2020 demonstrated no central or segmental pulmonary embolus, commonly reported imaging features of COVID-19 are present, bilateral pleural effusions, enlarged subcarinal and left hilar lymph nodes, 8 mm right middle lobe pulmonary nodule, emphysema.  Follow-up CT recommended.     CTA obtained 6/30/2018 demonstrated no PE, severe emphysema, bilateral hydronephrosis upper kidneys, atherosclerosis with coronary artery  calcifications, pancreatic calcifications consistent with history of chronic pancreatitis.     Echocardiogram obtained 10/31/2019 demonstrated normal left ventricular chamber size, wall thickness and systolic function, LVEF estimated 55%, indeterminant diastolic function, normal right ventricular size and systolic function, enlarged right atrium, moderately dilated left atrium, worsening mitral regurgitation-moderate, moderate tricuspid regurgitation with estimated RVSP of 60 mmHg.    Most recent pulmonary function testing 11/10/2018 demonstrated FEV1 of 0.88 L or 40% predicted, FVC of 2.45 L or 78% predicted, FEV1/FVC ratio of 36, residual volume 256% predicted, % predicted, DLCO 41% predicted. Per pulmonologist interpretation severe obstructive pulmonary disease, partial reversibility, reduction in DLCO compatible with clinical diagnosis of emphysema.\      Review of Systems   Constitutional: Positive for malaise/fatigue (mild). Negative for chills, fever and weight loss.   HENT: Positive for hearing loss. Negative for congestion, nosebleeds, sinus pain, sore throat and tinnitus.         Runny nose   Eyes: Negative.    Respiratory: Positive for cough (lately) and shortness of breath (with activity). Negative for sputum production and wheezing.    Cardiovascular: Negative for chest pain, palpitations, orthopnea and leg swelling.   Gastrointestinal: Negative for heartburn.        Upper dentures, no difficulty swallowing    Skin: Negative.    Neurological: Negative for dizziness, tremors and headaches.   Psychiatric/Behavioral: The patient does not have insomnia.        Past Medical History:   Diagnosis Date   • Centrilobular emphysema (HCC) 12/3/2015   • COPD (chronic obstructive pulmonary disease) (HCC)    • Dyslipidemia    • Hypertension    • Pulmonary emphysema (HCC)    • Pulmonary hypertension (HCC)        Past Surgical History:   Procedure Laterality Date   • HERNIA REPAIR  1990    right inguinal hernia         Family History   Problem Relation Age of Onset   • Heart Disease Father    • Heart Attack Brother    • Other Brother         CAD   • Cancer Brother         lung, other       Social History     Socioeconomic History   • Marital status:      Spouse name: Not on file   • Number of children: Not on file   • Years of education: Not on file   • Highest education level: Not on file   Occupational History   • Not on file   Social Needs   • Financial resource strain: Not on file   • Food insecurity     Worry: Not on file     Inability: Not on file   • Transportation needs     Medical: Not on file     Non-medical: Not on file   Tobacco Use   • Smoking status: Former Smoker     Packs/day: 1.00     Years: 54.00     Pack years: 54.00     Start date:      Quit date: 2008     Years since quittin.7   • Smokeless tobacco: Never Used   • Tobacco comment: Continued abstinence advised   Substance and Sexual Activity   • Alcohol use: Yes     Alcohol/week: 1.0 oz     Types: 2 Cans of beer per week     Comment: week   • Drug use: No   • Sexual activity: Yes     Partners: Female   Lifestyle   • Physical activity     Days per week: Not on file     Minutes per session: Not on file   • Stress: Not on file   Relationships   • Social connections     Talks on phone: Not on file     Gets together: Not on file     Attends Confucianist service: Not on file     Active member of club or organization: Not on file     Attends meetings of clubs or organizations: Not on file     Relationship status: Not on file   • Intimate partner violence     Fear of current or ex partner: Not on file     Emotionally abused: Not on file     Physically abused: Not on file     Forced sexual activity: Not on file   Other Topics Concern   • Not on file   Social History Narrative   • Not on file       Allergies as of 2020   • (No Known Allergies)        @Vital signs for this encounter:  Vitals:    20 1448 20 1452   Height: 1.702 m  "(5' 7\")    Weight: 63.4 kg (139 lb 12.8 oz)    Weight % change since last entry.: 0 %    BP: 110/72    Pulse: 73    BMI (Calculated): 21.9    Resp: 18    O2 sat % on O2:  94 %   O2 Flow Rate (L/min):  3       Current medications as of today   Current Outpatient Medications   Medication Sig Dispense Refill   • nystatin (MYCOSTATIN) powder nystatin 100,000 unit/gram topical powder     • nystatin (MYCOSTATIN) powder      • albuterol (PROVENTIL) 2.5mg/3ml Nebu Soln solution for nebulization albuterol sulfate 2.5 mg/3 mL (0.083 %) solution for nebulization   Inhale 2.5 mg every 4-6 hours by nebulization route for 30 days.     • albuterol 108 (90 Base) MCG/ACT Aero Soln inhalation aerosol albuterol sulfate HFA 90 mcg/actuation aerosol inhaler   INHALE 2 PUFFS BY MOUTH ONCE DAILY AS NEEDED     • apixaban (ELIQUIS) 2.5mg Tab Eliquis 2.5 mg tablet     • atorvastatin (LIPITOR) 10 MG Tab TAKE ONE TABLET BY MOUTH EVERY  Tab 2   • ELIQUIS 2.5 MG Tab TAKE ONE TABLET BY MOUTH TWICE A  Tab 3   • metoprolol (LOPRESSOR) 25 MG Tab TAKE ONE TABLET BY MOUTH TWICE A  Tab 3   • albuterol 108 (90 Base) MCG/ACT Aero Soln inhalation aerosol Inhale 1 Puff by mouth every four hours as needed for Shortness of Breath.     • albuterol (PROVENTIL) 2.5mg/3ml Nebu Soln solution for nebulization 2.5 mg by Nebulization route every four hours as needed for Shortness of Breath.     • Home Care Oxygen Inhale 4 L/min by mouth Continuous. Oxygen dose range: 4 L/min  Respiratory route via: Nasal Cannula   Oxygen supplier: Preferred     • lisinopril (PRINIVIL) 20 MG Tab Take 20 mg by mouth 2 Times a Day.     • valacyclovir (VALTREX) 1 GM Tab Take 1,000 mg by mouth every day. Scheduled.   Maintenance Medication.     • Fluticasone-Umeclidin-Vilant (TRELEGY ELLIPTA) 100-62.5-25 MCG/INH AEROSOL POWDER, BREATH ACTIVATED Inhale 1 Puff by mouth every day. Rinse mouth after use 1 Each 11   • tamsulosin (FLOMAX) 0.4 MG capsule Take 1 Cap by mouth " every bedtime. 90 Cap 3   • cefdinir (OMNICEF) 300 MG Cap cefdinir 300 mg capsule   Take 1 capsule every 12 hours by oral route.     • cephALEXin (KEFLEX) 500 MG Cap cephalexin 500 mg capsule     • Cephalexin 500 MG Tab cephalexin 500 mg tablet   Take 1 tablet every 12 hours by oral route.       No current facility-administered medications for this visit.          Physical Exam:   Gen:           Alert and oriented, No apparent distress. Mood and affect     appropriate, normal interaction with provider.  Eyes:          sclere white, conjunctive moist.  Hearing:     Grossly intact.  Dentition:    Fair lower dentition, upper dentures  Oropharynx:   Tongue normal, posterior pharynx without erythema or exudate.  Neck:        Supple, trachea midline, no masses.  Respiratory Effort: No intercostal retractions or use of accessory muscles.   Lung Auscultation:      Diminished; no rales, rhonchi or wheezing.  CV:            Regular rate and rhythm. No edema. No murmurs, rubs or gallops.  Digits, Nails, Ext: No clubbing, cyanosis, petechiae, or nodes.   Skin:        No rashes, lesions or ulcers noted on back or exposed skin    surfaces.                     Assessment:  1. Rhinorrhea  ipratropium (ATROVENT) 0.03 % Solution   2. Chronic obstructive pulmonary disease, unspecified COPD type (HCC)   continue current home regimen   3. Centrilobular emphysema (HCC)   if continues to experience some increased work of breathing may need antibiotics/steroid course   4. Cough   may be due to poor air quality versus rhinorrhea   5. PAH (pulmonary artery hypertension) (HCC)   follows with cardiology   6. Chronic respiratory failure with hypoxia (HCC)   remains on O2 at 3 to 3.5 L pulsed       Immunizations:    Flu: 9/26/2019  Pneumovax 23: 4/18/2019  Prevnar 13: 10/21/2014    Plan:     86 y.o. year old male here today for follow-up on COPD and chronic respiratory failure with hypoxia. Last seen in clinic 5/6/2020.  Patient is a former  smoker with reported 54-pack-year history and quit date in 2008.  Continued abstinence advised.    Pertinent past medical history includes April 9, 2020 hospitalization for shortness of breath, multifocal pneumonia, increasing weakness in his legs with ground-level fall x3 in home.    Past medical history also includes atrial fibrillation, pulmonary hypertension, centrilobular emphysema, sepsis, abnormal EKG, chronic respiratory failure on O2, bilateral hydronephrosis, STEMI.      Reviewed in clinic vitals including blood pressure of 110/72, heart rate of 73, O2 sat of 94% on room air and BMI of 21.9 kg/m².    Reviewed home medication regimen including albuterol inhaler which he uses 2-3 times per week, albuterol nebulizer which he reports using very occasionally, Trelegy, oxygen at 3 L, 3.5 L pulsed, Eliquis.  Patient complaining of constant runny nose which is especially annoying given wearing a mask.  Trial Atrovent nasal spray.  Also reporting mild increase in cough which may correlate with increased runny nose.  Assess for benefit.    Reviewed most recent imaging including chest x-ray obtained 5/2/2020 demonstrating changes consistent with COPD/emphysema, slight improvement in chronic interstitial opacities without interval change in the upper lobes, slight improvement in the linear opacities in both lower lobes, slightly elevated left hemidiaphragm.      CTA obtained 4/9/2020 demonstrated no central or segmental pulmonary embolus, commonly reported imaging features of COVID-19 are present, bilateral pleural effusions, enlarged subcarinal and left hilar lymph nodes, 8 mm right middle lobe pulmonary nodule, emphysema.  Follow-up CT again recommended.     CTA obtained 6/30/2018 demonstrated no PE, severe emphysema, bilateral hydronephrosis upper kidneys, atherosclerosis with coronary artery calcifications, pancreatic calcifications consistent with history of chronic pancreatitis.     Echocardiogram obtained  10/31/2019 demonstrated normal left ventricular chamber size, wall thickness and systolic function, LVEF estimated 55%, indeterminant diastolic function, normal right ventricular size and systolic function, enlarged right atrium, moderately dilated left atrium, worsening mitral regurgitation-moderate, moderate tricuspid regurgitation with estimated RVSP of 60 mmHg.    Most recent pulmonary function testing 11/10/2018 demonstrated FEV1 of 0.88 L or 40% predicted, FVC of 2.45 L or 78% predicted, FEV1/FVC ratio of 36, residual volume 256% predicted, % predicted, DLCO 41% predicted. Per pulmonologist interpretation severe obstructive pulmonary disease, partial reversibility, reduction in DLCO compatible with clinical diagnosis of emphysema.    Mild increased cough and shortness of breath, worsening of symptoms treat with rescue pack.     Health discussion included current COVID-19 recommendations including wearing mask in public, social distancing, frequent handwashing, early flu shot.    Follow-up in 6 months, sooner if needed.    This dictation was created using voice recognition software. The accuracy of the dictation is limited to the abilities of the software. I expect there may be some errors of grammar and possibly content.

## 2020-09-29 ENCOUNTER — HOSPITAL ENCOUNTER (OUTPATIENT)
Dept: LAB | Facility: MEDICAL CENTER | Age: 85
End: 2020-09-29
Attending: FAMILY MEDICINE
Payer: MEDICARE

## 2020-09-29 LAB
ALBUMIN SERPL BCP-MCNC: 4.3 G/DL (ref 3.2–4.9)
ALBUMIN/GLOB SERPL: 1.7 G/DL
ALP SERPL-CCNC: 68 U/L (ref 30–99)
ALT SERPL-CCNC: 13 U/L (ref 2–50)
ANION GAP SERPL CALC-SCNC: 11 MMOL/L (ref 7–16)
AST SERPL-CCNC: 19 U/L (ref 12–45)
BASOPHILS # BLD AUTO: 0.5 % (ref 0–1.8)
BASOPHILS # BLD: 0.03 K/UL (ref 0–0.12)
BILIRUB SERPL-MCNC: 0.4 MG/DL (ref 0.1–1.5)
BUN SERPL-MCNC: 9 MG/DL (ref 8–22)
CALCIUM SERPL-MCNC: 9.6 MG/DL (ref 8.5–10.5)
CHLORIDE SERPL-SCNC: 91 MMOL/L (ref 96–112)
CHOLEST SERPL-MCNC: 166 MG/DL (ref 100–199)
CO2 SERPL-SCNC: 28 MMOL/L (ref 20–33)
CREAT SERPL-MCNC: 0.7 MG/DL (ref 0.5–1.4)
EOSINOPHIL # BLD AUTO: 0.26 K/UL (ref 0–0.51)
EOSINOPHIL NFR BLD: 4 % (ref 0–6.9)
ERYTHROCYTE [DISTWIDTH] IN BLOOD BY AUTOMATED COUNT: 50.7 FL (ref 35.9–50)
FASTING STATUS PATIENT QL REPORTED: NORMAL
FERRITIN SERPL-MCNC: 466 NG/ML (ref 22–322)
GLOBULIN SER CALC-MCNC: 2.6 G/DL (ref 1.9–3.5)
GLUCOSE SERPL-MCNC: 107 MG/DL (ref 65–99)
HCT VFR BLD AUTO: 43.7 % (ref 42–52)
HDLC SERPL-MCNC: 73 MG/DL
HGB BLD-MCNC: 13.7 G/DL (ref 14–18)
IMM GRANULOCYTES # BLD AUTO: 0.04 K/UL (ref 0–0.11)
IMM GRANULOCYTES NFR BLD AUTO: 0.6 % (ref 0–0.9)
LDLC SERPL CALC-MCNC: 77 MG/DL
LYMPHOCYTES # BLD AUTO: 1.61 K/UL (ref 1–4.8)
LYMPHOCYTES NFR BLD: 24.5 % (ref 22–41)
MCH RBC QN AUTO: 30.6 PG (ref 27–33)
MCHC RBC AUTO-ENTMCNC: 31.4 G/DL (ref 33.7–35.3)
MCV RBC AUTO: 97.8 FL (ref 81.4–97.8)
MONOCYTES # BLD AUTO: 0.53 K/UL (ref 0–0.85)
MONOCYTES NFR BLD AUTO: 8.1 % (ref 0–13.4)
NEUTROPHILS # BLD AUTO: 4.1 K/UL (ref 1.82–7.42)
NEUTROPHILS NFR BLD: 62.3 % (ref 44–72)
NRBC # BLD AUTO: 0 K/UL
NRBC BLD-RTO: 0 /100 WBC
PLATELET # BLD AUTO: 285 K/UL (ref 164–446)
PMV BLD AUTO: 9.8 FL (ref 9–12.9)
POTASSIUM SERPL-SCNC: 4.8 MMOL/L (ref 3.6–5.5)
PROT SERPL-MCNC: 6.9 G/DL (ref 6–8.2)
RBC # BLD AUTO: 4.47 M/UL (ref 4.7–6.1)
SODIUM SERPL-SCNC: 130 MMOL/L (ref 135–145)
TRIGL SERPL-MCNC: 79 MG/DL (ref 0–149)
TSH SERPL DL<=0.005 MIU/L-ACNC: 2.77 UIU/ML (ref 0.38–5.33)
WBC # BLD AUTO: 6.6 K/UL (ref 4.8–10.8)

## 2020-09-29 PROCEDURE — 36415 COLL VENOUS BLD VENIPUNCTURE: CPT

## 2020-09-29 PROCEDURE — 84443 ASSAY THYROID STIM HORMONE: CPT

## 2020-09-29 PROCEDURE — 80053 COMPREHEN METABOLIC PANEL: CPT

## 2020-09-29 PROCEDURE — 83036 HEMOGLOBIN GLYCOSYLATED A1C: CPT

## 2020-09-29 PROCEDURE — 82728 ASSAY OF FERRITIN: CPT

## 2020-09-29 PROCEDURE — 85025 COMPLETE CBC W/AUTO DIFF WBC: CPT

## 2020-09-29 PROCEDURE — 80061 LIPID PANEL: CPT

## 2020-09-30 LAB
EST. AVERAGE GLUCOSE BLD GHB EST-MCNC: 114 MG/DL
HBA1C MFR BLD: 5.6 % (ref 0–5.6)

## 2020-11-06 ENCOUNTER — HOSPITAL ENCOUNTER (OUTPATIENT)
Dept: CARDIOLOGY | Facility: MEDICAL CENTER | Age: 85
End: 2020-11-06
Attending: INTERNAL MEDICINE
Payer: MEDICARE

## 2020-11-06 DIAGNOSIS — I34.0 MODERATE MITRAL REGURGITATION: ICD-10-CM

## 2020-11-06 LAB
LV EJECT FRACT  99904: 60
LV EJECT FRACT MOD 2C 99903: 56.66
LV EJECT FRACT MOD 4C 99902: 59.6
LV EJECT FRACT MOD BP 99901: 57.72

## 2020-11-06 PROCEDURE — 93306 TTE W/DOPPLER COMPLETE: CPT | Mod: 26 | Performed by: INTERNAL MEDICINE

## 2020-11-06 PROCEDURE — 93306 TTE W/DOPPLER COMPLETE: CPT

## 2020-11-10 ENCOUNTER — TELEPHONE (OUTPATIENT)
Dept: CARDIOLOGY | Facility: MEDICAL CENTER | Age: 85
End: 2020-11-10

## 2020-11-10 NOTE — TELEPHONE ENCOUNTER
----- Message from Desiree Monet R.N. sent at 11/10/2020 10:01 AM PST -----    ----- Message -----  From: Ayo Sr M.D.  Sent: 11/6/2020   5:07 PM PST  To: Desiree Monet R.N.    Echo looks good- no significant changes in mitral valve or pulmonary pressures. Continue plan of care

## 2021-01-11 DIAGNOSIS — Z23 NEED FOR VACCINATION: ICD-10-CM

## 2021-01-30 DIAGNOSIS — J44.9 CHRONIC OBSTRUCTIVE PULMONARY DISEASE, UNSPECIFIED COPD TYPE (HCC): ICD-10-CM

## 2021-02-01 ENCOUNTER — HOSPITAL ENCOUNTER (OUTPATIENT)
Dept: LAB | Facility: MEDICAL CENTER | Age: 86
End: 2021-02-01
Attending: FAMILY MEDICINE
Payer: MEDICARE

## 2021-02-01 LAB
ALBUMIN SERPL BCP-MCNC: 4.2 G/DL (ref 3.2–4.9)
ALBUMIN/GLOB SERPL: 1.5 G/DL
ALP SERPL-CCNC: 51 U/L (ref 30–99)
ALT SERPL-CCNC: 9 U/L (ref 2–50)
ANION GAP SERPL CALC-SCNC: 9 MMOL/L (ref 7–16)
AST SERPL-CCNC: 19 U/L (ref 12–45)
BASOPHILS # BLD AUTO: 0.5 % (ref 0–1.8)
BASOPHILS # BLD: 0.03 K/UL (ref 0–0.12)
BILIRUB SERPL-MCNC: 0.6 MG/DL (ref 0.1–1.5)
BUN SERPL-MCNC: 10 MG/DL (ref 8–22)
CALCIUM SERPL-MCNC: 9.7 MG/DL (ref 8.5–10.5)
CHLORIDE SERPL-SCNC: 95 MMOL/L (ref 96–112)
CO2 SERPL-SCNC: 29 MMOL/L (ref 20–33)
CREAT SERPL-MCNC: 0.65 MG/DL (ref 0.5–1.4)
EOSINOPHIL # BLD AUTO: 0.28 K/UL (ref 0–0.51)
EOSINOPHIL NFR BLD: 4.7 % (ref 0–6.9)
ERYTHROCYTE [DISTWIDTH] IN BLOOD BY AUTOMATED COUNT: 50 FL (ref 35.9–50)
EST. AVERAGE GLUCOSE BLD GHB EST-MCNC: 103 MG/DL
FASTING STATUS PATIENT QL REPORTED: NORMAL
GLOBULIN SER CALC-MCNC: 2.8 G/DL (ref 1.9–3.5)
GLUCOSE SERPL-MCNC: 93 MG/DL (ref 65–99)
HBA1C MFR BLD: 5.2 % (ref 0–5.6)
HCT VFR BLD AUTO: 41.6 % (ref 42–52)
HGB BLD-MCNC: 13.5 G/DL (ref 14–18)
IMM GRANULOCYTES # BLD AUTO: 0.02 K/UL (ref 0–0.11)
IMM GRANULOCYTES NFR BLD AUTO: 0.3 % (ref 0–0.9)
LYMPHOCYTES # BLD AUTO: 1.16 K/UL (ref 1–4.8)
LYMPHOCYTES NFR BLD: 19.5 % (ref 22–41)
MCH RBC QN AUTO: 31.8 PG (ref 27–33)
MCHC RBC AUTO-ENTMCNC: 32.5 G/DL (ref 33.7–35.3)
MCV RBC AUTO: 97.9 FL (ref 81.4–97.8)
MONOCYTES # BLD AUTO: 0.68 K/UL (ref 0–0.85)
MONOCYTES NFR BLD AUTO: 11.4 % (ref 0–13.4)
NEUTROPHILS # BLD AUTO: 3.78 K/UL (ref 1.82–7.42)
NEUTROPHILS NFR BLD: 63.6 % (ref 44–72)
NRBC # BLD AUTO: 0 K/UL
NRBC BLD-RTO: 0 /100 WBC
PLATELET # BLD AUTO: 273 K/UL (ref 164–446)
PMV BLD AUTO: 10.3 FL (ref 9–12.9)
POTASSIUM SERPL-SCNC: 4.3 MMOL/L (ref 3.6–5.5)
PROT SERPL-MCNC: 7 G/DL (ref 6–8.2)
RBC # BLD AUTO: 4.25 M/UL (ref 4.7–6.1)
SODIUM SERPL-SCNC: 133 MMOL/L (ref 135–145)
WBC # BLD AUTO: 6 K/UL (ref 4.8–10.8)

## 2021-02-01 PROCEDURE — 85025 COMPLETE CBC W/AUTO DIFF WBC: CPT

## 2021-02-01 PROCEDURE — 36415 COLL VENOUS BLD VENIPUNCTURE: CPT

## 2021-02-01 PROCEDURE — 83036 HEMOGLOBIN GLYCOSYLATED A1C: CPT

## 2021-02-01 PROCEDURE — 80053 COMPREHEN METABOLIC PANEL: CPT

## 2021-02-01 NOTE — TELEPHONE ENCOUNTER
Have we ever prescribed this med? Yes.  If yes, what date? 01/15/20    Last OV: 09/11/20 with Antionette Rooney PA-C     Return in about 6 months (around 3/11/2021) for Return with Antionette Rooney PA-C.      Next OV: No Pending appt.     DX: Chronic obstructive pulmonary disease, unspecified COPD type (HCC) (J44.9)    Medications:   Requested Prescriptions     Pending Prescriptions Disp Refills   • TRELEGY ELLIPTA 100-62.5-25 MCG/INH AEROSOL POWDER, BREATH ACTIVATED [Pharmacy Med Name: TRELEGY ELLIPTA 100-62.5-25 AEPB]  11     Sig: INHALE ONE PUFF BY MOUTH EVERY DAY RINSE MOUTH AFTER USE

## 2021-02-26 DIAGNOSIS — E78.5 HYPERLIPIDEMIA, UNSPECIFIED HYPERLIPIDEMIA TYPE: ICD-10-CM

## 2021-02-26 RX ORDER — ATORVASTATIN CALCIUM 10 MG/1
10 TABLET, FILM COATED ORAL DAILY
Qty: 100 TABLET | Refills: 0 | Status: SHIPPED | OUTPATIENT
Start: 2021-02-26 | End: 2021-06-01 | Stop reason: SDUPTHER

## 2021-03-10 ENCOUNTER — OFFICE VISIT (OUTPATIENT)
Dept: SLEEP MEDICINE | Facility: MEDICAL CENTER | Age: 86
End: 2021-03-10
Payer: MEDICARE

## 2021-03-10 VITALS
RESPIRATION RATE: 16 BRPM | OXYGEN SATURATION: 96 % | BODY MASS INDEX: 21.82 KG/M2 | HEIGHT: 67 IN | DIASTOLIC BLOOD PRESSURE: 86 MMHG | SYSTOLIC BLOOD PRESSURE: 130 MMHG | HEART RATE: 82 BPM | WEIGHT: 139 LBS

## 2021-03-10 DIAGNOSIS — J96.11 CHRONIC RESPIRATORY FAILURE WITH HYPOXIA (HCC): ICD-10-CM

## 2021-03-10 DIAGNOSIS — I27.21 PAH (PULMONARY ARTERY HYPERTENSION) (HCC): ICD-10-CM

## 2021-03-10 DIAGNOSIS — J44.9 CHRONIC OBSTRUCTIVE PULMONARY DISEASE, UNSPECIFIED COPD TYPE (HCC): ICD-10-CM

## 2021-03-10 PROCEDURE — 99213 OFFICE O/P EST LOW 20 MIN: CPT | Performed by: PHYSICIAN ASSISTANT

## 2021-03-10 ASSESSMENT — ENCOUNTER SYMPTOMS
SHORTNESS OF BREATH: 1
WEIGHT LOSS: 0
SORE THROAT: 0
DIZZINESS: 0
HEADACHES: 0
ORTHOPNEA: 0
TREMORS: 0
SINUS PAIN: 0
SPUTUM PRODUCTION: 1
WHEEZING: 1
PALPITATIONS: 0
INSOMNIA: 0
FEVER: 0
COUGH: 1
CHILLS: 0
HEARTBURN: 0

## 2021-03-10 ASSESSMENT — FIBROSIS 4 INDEX: FIB4 SCORE: 2

## 2021-03-10 NOTE — PROGRESS NOTES
CC: None    HPI:  Garrett Alvarez is a 86 y.o. year old male here today for follow-up on COPD and chronic respiratory failure with hypoxia. Patient last seen in clinic 9/11/2020.  Patient is a former smoker with quit date in 2008 and 54-pack-year history.    Pertinent past medical history includes multifocal pneumonia, ground-level falls, atrial fibrillation, pulmonary hypertension, pulmonary nodules, sepsis, bilateral hydronephrosis and STEMI.  Last hospitalization 4/9/2020 and patient denies recent exacerbation.    Reviewed in clinic vitals including blood pressure 130/86, heart rate of 82, O2 sat of 96 on 4 L and BMI of 21.77 kg/m².    Reviewed home medication regimen including albuterol nebulizer and inhaler, Eliquis, Atrovent nasal spray, Trelegy, lisinopril-denies chronic dry cough, oxygen.    Reviewed most recent imaging including chest x-ray obtained 5/2/2020 demonstrating changes consistent with COPD/emphysema, slight improvement in interstitial opacities with probable residual areas of underlying fibrosis.     CTA obtained 4/9/2020 demonstrated no central or segmental pulmonary embolus, commonly reported imaging features of COVID-19 are present, bilateral pleural effusions, enlarged subcarinal and left hilar lymph nodes, 8 mm right middle lobe pulmonary nodule, emphysema.  Follow-up CT recommended.     CTA obtained 6/30/2018 demonstrated no PE, severe emphysema, bilateral hydronephrosis upper kidneys, atherosclerosis with coronary artery calcifications, pancreatic calcifications consistent with history of chronic pancreatitis.      Echocardiogram obtained 10/31/2019 demonstrated normal left ventricular chamber size, wall thickness and systolic function, LVEF estimated 55%, indeterminant diastolic function, normal right ventricular size and systolic function, enlarged right atrium, moderately dilated left atrium, worsening mitral regurgitation-moderate, moderate tricuspid regurgitation with estimated  RVSP of 60 mmHg.     Most recent pulmonary function testing 11/10/2018 demonstrated FEV1 of 0.88 L or 40% predicted, FVC of 2.45 L or 78% predicted, FEV1/FVC ratio of 36, residual volume 256% predicted, % predicted, DLCO 41% predicted. Per pulmonologist interpretation severe obstructive pulmonary disease, partial reversibility, reduction in DLCO compatible with clinical diagnosis of emphysema.      Review of Systems   Constitutional: Negative for chills, fever, malaise/fatigue and weight loss.   HENT: Positive for congestion and hearing loss (right ). Negative for nosebleeds, sinus pain, sore throat and tinnitus.    Respiratory: Positive for cough, sputum production (clear ), shortness of breath (with activity ) and wheezing (sometimes).    Cardiovascular: Negative for chest pain, palpitations and orthopnea.   Gastrointestinal: Negative for heartburn.        Upper, no swallow issues   Neurological: Negative for dizziness, tremors and headaches.   Psychiatric/Behavioral: The patient does not have insomnia.        Past Medical History:   Diagnosis Date   • Centrilobular emphysema (HCC) 12/3/2015   • COPD (chronic obstructive pulmonary disease) (HCC)    • Dyslipidemia    • Hypertension    • Pulmonary emphysema (HCC)    • Pulmonary hypertension (HCC)        Past Surgical History:   Procedure Laterality Date   • HERNIA REPAIR  1990    right inguinal hernia        Family History   Problem Relation Age of Onset   • Heart Disease Father    • Heart Attack Brother    • Other Brother         CAD   • Cancer Brother         lung, other       Social History     Socioeconomic History   • Marital status:      Spouse name: Not on file   • Number of children: Not on file   • Years of education: Not on file   • Highest education level: Not on file   Occupational History   • Not on file   Tobacco Use   • Smoking status: Former Smoker     Packs/day: 1.00     Years: 54.00     Pack years: 54.00     Start date: 1954     Quit  "date: 2008     Years since quittin.1   • Smokeless tobacco: Never Used   • Tobacco comment: Continued abstinence advised   Substance and Sexual Activity   • Alcohol use: Yes     Alcohol/week: 1.0 oz     Types: 2 Cans of beer per week     Comment: week   • Drug use: No   • Sexual activity: Yes     Partners: Female   Other Topics Concern   • Not on file   Social History Narrative   • Not on file     Social Determinants of Health     Financial Resource Strain:    • Difficulty of Paying Living Expenses:    Food Insecurity:    • Worried About Running Out of Food in the Last Year:    • Ran Out of Food in the Last Year:    Transportation Needs:    • Lack of Transportation (Medical):    • Lack of Transportation (Non-Medical):    Physical Activity:    • Days of Exercise per Week:    • Minutes of Exercise per Session:    Stress:    • Feeling of Stress :    Social Connections:    • Frequency of Communication with Friends and Family:    • Frequency of Social Gatherings with Friends and Family:    • Attends Zoroastrian Services:    • Active Member of Clubs or Organizations:    • Attends Club or Organization Meetings:    • Marital Status:    Intimate Partner Violence:    • Fear of Current or Ex-Partner:    • Emotionally Abused:    • Physically Abused:    • Sexually Abused:        Allergies as of 03/10/2021   • (No Known Allergies)        @Vital signs for this encounter:  Vitals:    03/10/21 1514 03/10/21 1516   Height: 1.702 m (5' 7\")    Weight: 63 kg (139 lb)    Weight % change since last entry.: 0 %    BP: 130/86    Pulse: 82    BMI (Calculated): 21.77    Resp: 16    O2 Flow Rate (L/min):  4       Current medications as of today   Current Outpatient Medications   Medication Sig Dispense Refill   • atorvastatin (LIPITOR) 10 MG Tab Take 1 tablet by mouth every day. 100 tablet 0   • TRELEGY ELLIPTA 100-62.5-25 MCG/INH AEROSOL POWDER, BREATH ACTIVATED INHALE ONE PUFF BY MOUTH EVERY DAY RINSE MOUTH AFTER USE 60 Each 1   • " cefdinir (OMNICEF) 300 MG Cap cefdinir 300 mg capsule   Take 1 capsule every 12 hours by oral route.     • cephALEXin (KEFLEX) 500 MG Cap cephalexin 500 mg capsule     • Cephalexin 500 MG Tab cephalexin 500 mg tablet   Take 1 tablet every 12 hours by oral route.     • nystatin (MYCOSTATIN) powder nystatin 100,000 unit/gram topical powder     • albuterol 108 (90 Base) MCG/ACT Aero Soln inhalation aerosol albuterol sulfate HFA 90 mcg/actuation aerosol inhaler   INHALE 2 PUFFS BY MOUTH ONCE DAILY AS NEEDED     • apixaban (ELIQUIS) 2.5mg Tab Eliquis 2.5 mg tablet     • ipratropium (ATROVENT) 0.03 % Solution Spray 1-2 Sprays in nose 3 times a day as needed. Each nostril. 30 mL 1   • ELIQUIS 2.5 MG Tab TAKE ONE TABLET BY MOUTH TWICE A  Tab 3   • metoprolol (LOPRESSOR) 25 MG Tab TAKE ONE TABLET BY MOUTH TWICE A  Tab 3   • albuterol 108 (90 Base) MCG/ACT Aero Soln inhalation aerosol Inhale 1 Puff by mouth every four hours as needed for Shortness of Breath.     • albuterol (PROVENTIL) 2.5mg/3ml Nebu Soln solution for nebulization 2.5 mg by Nebulization route every four hours as needed for Shortness of Breath.     • lisinopril (PRINIVIL) 20 MG Tab Take 20 mg by mouth 2 Times a Day.     • valacyclovir (VALTREX) 1 GM Tab Take 1,000 mg by mouth every day. Scheduled.   Maintenance Medication.     • tamsulosin (FLOMAX) 0.4 MG capsule Take 1 Cap by mouth every bedtime. 90 Cap 3   • nystatin (MYCOSTATIN) powder      • albuterol (PROVENTIL) 2.5mg/3ml Nebu Soln solution for nebulization albuterol sulfate 2.5 mg/3 mL (0.083 %) solution for nebulization   Inhale 2.5 mg every 4-6 hours by nebulization route for 30 days.     • Home Care Oxygen Inhale 4 L/min by mouth Continuous. Oxygen dose range: 4 L/min  Respiratory route via: Nasal Cannula   Oxygen supplier: Preferred       No current facility-administered medications for this visit.         Physical Exam:   Gen:           Alert and oriented, No apparent distress. Mood  and affect appropriate, normal interaction with provider.  Eyes:          sclere white, conjunctive moist.  Hearing:     Grossly intact.  Dentition:    Upper dentures  Oropharynx:   Tongue normal, posterior pharynx without erythema or exudate.  Neck:        Supple, trachea midline, no masses.  Respiratory Effort: No intercostal retractions or use of accessory muscles.   Lung Auscultation:      Decreased; no rales, rhonchi or wheezing.  CV:            Regular rate and rhythm. No edema. No murmurs, rubs or gallops.  Digits, Nails, Ext: No clubbing, cyanosis, petechiae, or nodes.   Skin:        No rashes, lesions or ulcers noted on exposed skin surfaces.                     Assessment:  1. Chronic obstructive pulmonary disease, unspecified COPD type (HCC)  Fluticasone-Umeclidin-Vilant (TRELEGY ELLIPTA) 100-62.5-25 MCG/INH AEROSOL POWDER, BREATH ACTIVATED   2. Chronic respiratory failure with hypoxia (HCC)     3. PAH (pulmonary artery hypertension) ([facility])         Immunizations:    Flu: 9/26/2019  Pneumovax 23: 4/18/2019  Prevnar 13: 10/21/2014    Plan:  86 y.o. year old male here today for follow-up on COPD and chronic respiratory failure with hypoxia. Patient last seen in clinic 9/11/2020.  Patient is a former smoker with quit date in 2008 and 54-pack-year history.    Pertinent past medical history includes multifocal pneumonia, ground-level falls, atrial fibrillation, pulmonary hypertension, pulmonary nodules, sepsis, bilateral hydronephrosis and STEMI.    Reviewed in clinic vitals including blood pressure 130/86, heart rate of 82, O2 sat of 96 on 4 L and BMI of 21.77 kg/m².    Reviewed home medication regimen including albuterol nebulizer and inhaler, Eliquis, Atrovent nasal spray, Trelegy, lisinopril-denies chronic dry cough, oxygen.    Reviewed most recent imaging including chest x-ray obtained 5/2/2020 demonstrating changes consistent with COPD/emphysema, slight improvement in interstitial opacities with probable  residual areas of underlying fibrosis.     Echocardiogram obtained 10/31/2019 demonstrated normal left ventricular chamber size, wall thickness and systolic function, LVEF estimated 55%, indeterminant diastolic function, normal right ventricular size and systolic function, enlarged right atrium, moderately dilated left atrium, worsening mitral regurgitation-moderate, moderate tricuspid regurgitation with estimated RVSP of 60 mmHg.     Most recent pulmonary function testing 11/10/2018 demonstrated FEV1 of 0.88 L or 40% predicted, FVC of 2.45 L or 78% predicted, FEV1/FVC ratio of 36, residual volume 256% predicted, % predicted, DLCO 41% predicted. Per pulmonologist interpretation severe obstructive pulmonary disease, partial reversibility, reduction in DLCO compatible with clinical diagnosis of emphysema.      This dictation was created using voice recognition software. The accuracy of the dictation is limited to the abilities of the software. I expect there may be some errors of grammar and possibly content.

## 2021-03-10 NOTE — PATIENT INSTRUCTIONS
1-continue same medicine  2-reviewed oral care  3-had flu and first covid vaccine  4-refill on Trelegy  5-follow up in 6 months

## 2021-03-12 ENCOUNTER — OFFICE VISIT (OUTPATIENT)
Dept: CARDIOLOGY | Facility: MEDICAL CENTER | Age: 86
End: 2021-03-12
Payer: MEDICARE

## 2021-03-12 VITALS
DIASTOLIC BLOOD PRESSURE: 78 MMHG | OXYGEN SATURATION: 99 % | WEIGHT: 140 LBS | HEART RATE: 74 BPM | RESPIRATION RATE: 18 BRPM | SYSTOLIC BLOOD PRESSURE: 148 MMHG | BODY MASS INDEX: 21.97 KG/M2 | HEIGHT: 67 IN

## 2021-03-12 DIAGNOSIS — E78.5 HYPERLIPIDEMIA, UNSPECIFIED HYPERLIPIDEMIA TYPE: ICD-10-CM

## 2021-03-12 DIAGNOSIS — I34.0 MODERATE MITRAL REGURGITATION: ICD-10-CM

## 2021-03-12 DIAGNOSIS — I27.21 PAH (PULMONARY ARTERY HYPERTENSION) (HCC): ICD-10-CM

## 2021-03-12 DIAGNOSIS — J44.1 COPD EXACERBATION (HCC): ICD-10-CM

## 2021-03-12 DIAGNOSIS — I48.0 PAROXYSMAL ATRIAL FIBRILLATION (HCC): ICD-10-CM

## 2021-03-12 DIAGNOSIS — I10 ESSENTIAL HYPERTENSION: ICD-10-CM

## 2021-03-12 PROCEDURE — 99214 OFFICE O/P EST MOD 30 MIN: CPT | Performed by: INTERNAL MEDICINE

## 2021-03-12 ASSESSMENT — FIBROSIS 4 INDEX: FIB4 SCORE: 2

## 2021-03-12 NOTE — PROGRESS NOTES
"CARDIOLOGY OUTPATIENT FOLLOWUP    PCP: Barbara Wilkins M.D.    1. Moderate mitral regurgitation    2. Paroxysmal atrial fibrillation (HCC)    3. COPD exacerbation (HCC)    4. Hyperlipidemia, unspecified hyperlipidemia type    5. Essential hypertension    6. PAH (pulmonary artery hypertension) (HCC)        Garrett Alvarez is clinically stable from a cardiovascular standpoint aside from an elevated blood pressure reading today.  I encouraged him to get a home blood pressure monitor and recommended that he follow-up in our office in 1 month's time to verify control of blood pressure.  He should otherwise continue on the current cardiovascular medications including atorvastatin, Eliquis lisinopril and metoprolol    Follow up: in 1 month     Chief Complaint   Patient presents with   • Atrial Fibrillation     Paroxysmal atrial fibrillation (HCC)       History: Garrett Alvarez is a 86 y.o. male with past medical history of COPD presenting for follow-up of non-STEMI as well as atrial fibrillation.  He was diagnosed with non-STEMI in 2018 after an altered consciousness episode.  He has been managed medically since and done well.  Last year he developed chest discomfort and was diagnosed with paroxysmal atrial fibrillation.  He was started on anticoagulation in place of antiplatelet therapy and has done well since.  He remains active around his home, goes to the store every day but does have to carry an oxygen concentrator with him.  He does not have any new cardiovascular symptoms.  The blood pressure is elevated and he does not routinely measure at home.      ROS:  All other systems reviewed and negative except as per the HPI    PE:  /78 (BP Location: Right arm, Patient Position: Sitting, BP Cuff Size: Adult)   Pulse 74   Resp 18   Ht 1.702 m (5' 7\")   Wt 63.5 kg (140 lb)   SpO2 99%   BMI 21.93 kg/m²   Gen: Well appearing  HEENT: Symmetric face. Anicteric sclerae. Moist mucus membranes  NECK: No JVD. " No lymphadenopathy  CARDIAC: Normal PMI, regular, normal S1, S2. without murmur  VASCULATURE: Normal carotid amplitude without bruit.   RESP: Clear to auscultation bilaterally  ABD: Soft, non-tender, non-distended  EXT: No edema, no clubbing or cyanosis  SKIN: Warm and dry  NEURO: No gross deficits  PSYCH: Appropriate affect, participates in conversation    Past Medical History:   Diagnosis Date   • Centrilobular emphysema (HCC) 12/3/2015   • COPD (chronic obstructive pulmonary disease) ([facility])    • Dyslipidemia    • Hypertension    • Pulmonary emphysema (HCC)    • Pulmonary hypertension (HCC)      No Known Allergies  Outpatient Encounter Medications as of 3/12/2021   Medication Sig Dispense Refill   • Fluticasone-Umeclidin-Vilant (TRELEGY ELLIPTA) 100-62.5-25 MCG/INH AEROSOL POWDER, BREATH ACTIVATED Inhale 1 Inhalation every day. 60 Each 1   • atorvastatin (LIPITOR) 10 MG Tab Take 1 tablet by mouth every day. 100 tablet 0   • nystatin (MYCOSTATIN) powder nystatin 100,000 unit/gram topical powder     • albuterol (PROVENTIL) 2.5mg/3ml Nebu Soln solution for nebulization albuterol sulfate 2.5 mg/3 mL (0.083 %) solution for nebulization   Inhale 2.5 mg every 4-6 hours by nebulization route for 30 days.     • ipratropium (ATROVENT) 0.03 % Solution Spray 1-2 Sprays in nose 3 times a day as needed. Each nostril. 30 mL 1   • ELIQUIS 2.5 MG Tab TAKE ONE TABLET BY MOUTH TWICE A  Tab 3   • metoprolol (LOPRESSOR) 25 MG Tab TAKE ONE TABLET BY MOUTH TWICE A  Tab 3   • albuterol 108 (90 Base) MCG/ACT Aero Soln inhalation aerosol Inhale 1 Puff by mouth every four hours as needed for Shortness of Breath.     • Home Care Oxygen Inhale 4 L/min by mouth Continuous. Oxygen dose range: 4 L/min  Respiratory route via: Nasal Cannula   Oxygen supplier: Preferred     • lisinopril (PRINIVIL) 20 MG Tab Take 20 mg by mouth 2 Times a Day.     • valacyclovir (VALTREX) 1 GM Tab Take 1,000 mg by mouth every day. Scheduled.    Maintenance Medication.     • tamsulosin (FLOMAX) 0.4 MG capsule Take 1 Cap by mouth every bedtime. 90 Cap 3   • [DISCONTINUED] nystatin (MYCOSTATIN) powder      • [DISCONTINUED] albuterol 108 (90 Base) MCG/ACT Aero Soln inhalation aerosol albuterol sulfate HFA 90 mcg/actuation aerosol inhaler   INHALE 2 PUFFS BY MOUTH ONCE DAILY AS NEEDED     • [DISCONTINUED] apixaban (ELIQUIS) 2.5mg Tab Eliquis 2.5 mg tablet     • [DISCONTINUED] albuterol (PROVENTIL) 2.5mg/3ml Nebu Soln solution for nebulization 2.5 mg by Nebulization route every four hours as needed for Shortness of Breath.       No facility-administered encounter medications on file as of 3/12/2021.     Social History     Socioeconomic History   • Marital status:      Spouse name: Not on file   • Number of children: Not on file   • Years of education: Not on file   • Highest education level: Not on file   Occupational History   • Not on file   Tobacco Use   • Smoking status: Former Smoker     Packs/day: 1.00     Years: 54.00     Pack years: 54.00     Start date:      Quit date: 2008     Years since quittin.2   • Smokeless tobacco: Never Used   • Tobacco comment: Continued abstinence advised   Substance and Sexual Activity   • Alcohol use: Yes     Alcohol/week: 1.0 oz     Types: 2 Cans of beer per week     Comment: week   • Drug use: No   • Sexual activity: Yes     Partners: Female   Other Topics Concern   • Not on file   Social History Narrative   • Not on file     Social Determinants of Health     Financial Resource Strain:    • Difficulty of Paying Living Expenses:    Food Insecurity:    • Worried About Running Out of Food in the Last Year:    • Ran Out of Food in the Last Year:    Transportation Needs:    • Lack of Transportation (Medical):    • Lack of Transportation (Non-Medical):    Physical Activity:    • Days of Exercise per Week:    • Minutes of Exercise per Session:    Stress:    • Feeling of Stress :    Social Connections:    •  Frequency of Communication with Friends and Family:    • Frequency of Social Gatherings with Friends and Family:    • Attends Faith Services:    • Active Member of Clubs or Organizations:    • Attends Club or Organization Meetings:    • Marital Status:    Intimate Partner Violence:    • Fear of Current or Ex-Partner:    • Emotionally Abused:    • Physically Abused:    • Sexually Abused:        Studies  Lab Results   Component Value Date/Time    CHOLSTRLTOT 166 09/29/2020 09:32 AM    LDL 77 09/29/2020 09:32 AM    HDL 73 09/29/2020 09:32 AM    TRIGLYCERIDE 79 09/29/2020 09:32 AM       Lab Results   Component Value Date/Time    SODIUM 133 (L) 02/01/2021 09:36 AM    POTASSIUM 4.3 02/01/2021 09:36 AM    CHLORIDE 95 (L) 02/01/2021 09:36 AM    CO2 29 02/01/2021 09:36 AM    GLUCOSE 93 02/01/2021 09:36 AM    BUN 10 02/01/2021 09:36 AM    CREATININE 0.65 02/01/2021 09:36 AM    CREATININE 1.1 12/26/2008 07:59 AM     Lab Results   Component Value Date/Time    ALKPHOSPHAT 51 02/01/2021 09:36 AM    ASTSGOT 19 02/01/2021 09:36 AM    ALTSGPT 9 02/01/2021 09:36 AM    TBILIRUBIN 0.6 02/01/2021 09:36 AM        For this encounter I reviewed the following medical records BMP, LFT and CBC   For this encounter I directly reviewed ECG tracings.  I agree that paroxysmal atrial fibrillation is present

## 2021-03-29 DIAGNOSIS — J44.9 CHRONIC OBSTRUCTIVE PULMONARY DISEASE, UNSPECIFIED COPD TYPE (HCC): ICD-10-CM

## 2021-03-29 NOTE — TELEPHONE ENCOUNTER
Have we ever prescribed this med? Yes.  If yes, what date? 03/10/2021    Last OV: 03/10/2021 - FIGUEROA BURDICK    Next OV: none scheduled     DX: COPD    Medications: Trelegy

## 2021-04-09 ENCOUNTER — OFFICE VISIT (OUTPATIENT)
Dept: CARDIOLOGY | Facility: MEDICAL CENTER | Age: 86
End: 2021-04-09
Payer: MEDICARE

## 2021-04-09 VITALS
HEART RATE: 70 BPM | HEIGHT: 67 IN | SYSTOLIC BLOOD PRESSURE: 116 MMHG | DIASTOLIC BLOOD PRESSURE: 80 MMHG | BODY MASS INDEX: 22.57 KG/M2 | WEIGHT: 143.8 LBS | RESPIRATION RATE: 14 BRPM | OXYGEN SATURATION: 100 %

## 2021-04-09 DIAGNOSIS — I48.0 PAROXYSMAL ATRIAL FIBRILLATION (HCC): ICD-10-CM

## 2021-04-09 DIAGNOSIS — E78.5 HYPERLIPIDEMIA, UNSPECIFIED HYPERLIPIDEMIA TYPE: ICD-10-CM

## 2021-04-09 DIAGNOSIS — I34.0 NONRHEUMATIC MITRAL VALVE REGURGITATION: ICD-10-CM

## 2021-04-09 DIAGNOSIS — I10 ESSENTIAL HYPERTENSION: ICD-10-CM

## 2021-04-09 DIAGNOSIS — I27.21 PAH (PULMONARY ARTERY HYPERTENSION) (HCC): ICD-10-CM

## 2021-04-09 PROCEDURE — 99214 OFFICE O/P EST MOD 30 MIN: CPT | Performed by: NURSE PRACTITIONER

## 2021-04-09 ASSESSMENT — FIBROSIS 4 INDEX: FIB4 SCORE: 2

## 2021-04-09 ASSESSMENT — ENCOUNTER SYMPTOMS
SPUTUM PRODUCTION: 0
PND: 0
WHEEZING: 0
HEMOPTYSIS: 0
NERVOUS/ANXIOUS: 0
COUGH: 0
ORTHOPNEA: 0
SHORTNESS OF BREATH: 1
WEAKNESS: 0
DIZZINESS: 0
NAUSEA: 0
VOMITING: 0
CLAUDICATION: 0
PALPITATIONS: 0

## 2021-04-09 NOTE — PROGRESS NOTES
Chief Complaint   Patient presents with   • Hypertension     F/V   • Atrial Fibrillation     F/V Dx:Atrial fibrillation (HCC)   • Hyperlipidemia     F/V Dx:HLD (hyperlipidemia)       Subjective:   Garrett Alvarez is a 86 y.o. male who presents today for follow up hypertension.     He is followed by Dr. Sr in our clinic, history of COPD, dyslipidemia, hypertension, atrial fibrillation, NSTEMI 2018 after an altered consciousness episode.     Last OV with Dr. Sr a month ago, blood pressure was elevated.     Patient has been checking on his blood pressure at home sbp 120-130 per patient. Feeling much better since starting vitamin D3 supplements.     Atrial fibrillation; denies any blood in urine/stool. Denies any palpitations.     COPD: on 4L of oxygen continuously.     Past Medical History:   Diagnosis Date   • Centrilobular emphysema (HCC) 12/3/2015   • COPD (chronic obstructive pulmonary disease) (HCC)    • Dyslipidemia    • Hypertension    • Pulmonary emphysema (HCC)    • Pulmonary hypertension (HCC)      Past Surgical History:   Procedure Laterality Date   • HERNIA REPAIR      right inguinal hernia      Family History   Problem Relation Age of Onset   • Heart Disease Father    • Heart Attack Brother    • Other Brother         CAD   • Cancer Brother         lung, other     Social History     Socioeconomic History   • Marital status:      Spouse name: Not on file   • Number of children: Not on file   • Years of education: Not on file   • Highest education level: Not on file   Occupational History   • Not on file   Tobacco Use   • Smoking status: Former Smoker     Packs/day: 1.00     Years: 54.00     Pack years: 54.00     Start date:      Quit date: 2008     Years since quittin.2   • Smokeless tobacco: Never Used   • Tobacco comment: Continued abstinence advised   Substance and Sexual Activity   • Alcohol use: Yes     Alcohol/week: 1.0 oz     Types: 2 Cans of beer per week      Comment: week   • Drug use: No   • Sexual activity: Yes     Partners: Female   Other Topics Concern   • Not on file   Social History Narrative   • Not on file     Social Determinants of Health     Financial Resource Strain:    • Difficulty of Paying Living Expenses:    Food Insecurity:    • Worried About Running Out of Food in the Last Year:    • Ran Out of Food in the Last Year:    Transportation Needs:    • Lack of Transportation (Medical):    • Lack of Transportation (Non-Medical):    Physical Activity:    • Days of Exercise per Week:    • Minutes of Exercise per Session:    Stress:    • Feeling of Stress :    Social Connections:    • Frequency of Communication with Friends and Family:    • Frequency of Social Gatherings with Friends and Family:    • Attends Baptism Services:    • Active Member of Clubs or Organizations:    • Attends Club or Organization Meetings:    • Marital Status:    Intimate Partner Violence:    • Fear of Current or Ex-Partner:    • Emotionally Abused:    • Physically Abused:    • Sexually Abused:      No Known Allergies  Outpatient Encounter Medications as of 4/9/2021   Medication Sig Dispense Refill   • TRELEGY ELLIPTA 100-62.5-25 MCG/INH AEROSOL POWDER, BREATH ACTIVATED INHALE 1 PUFF BY MOUTH INTO THE LUNGS ONCE DAILY . RINSE MOUTH AFTER USE 1 Each 3   • atorvastatin (LIPITOR) 10 MG Tab Take 1 tablet by mouth every day. 100 tablet 0   • albuterol (PROVENTIL) 2.5mg/3ml Nebu Soln solution for nebulization albuterol sulfate 2.5 mg/3 mL (0.083 %) solution for nebulization   Inhale 2.5 mg every 4-6 hours by nebulization route for 30 days.     • ELIQUIS 2.5 MG Tab TAKE ONE TABLET BY MOUTH TWICE A  Tab 3   • metoprolol (LOPRESSOR) 25 MG Tab TAKE ONE TABLET BY MOUTH TWICE A  Tab 3   • albuterol 108 (90 Base) MCG/ACT Aero Soln inhalation aerosol Inhale 1 Puff by mouth every four hours as needed for Shortness of Breath.     • Home Care Oxygen Inhale 4 L/min by mouth Continuous.  "Oxygen dose range: 4 L/min  Respiratory route via: Nasal Cannula   Oxygen supplier: Preferred     • lisinopril (PRINIVIL) 20 MG Tab Take 20 mg by mouth 2 Times a Day.     • tamsulosin (FLOMAX) 0.4 MG capsule Take 1 Cap by mouth every bedtime. 90 Cap 3   • [DISCONTINUED] nystatin (MYCOSTATIN) powder nystatin 100,000 unit/gram topical powder     • [DISCONTINUED] ipratropium (ATROVENT) 0.03 % Solution Spray 1-2 Sprays in nose 3 times a day as needed. Each nostril. 30 mL 1   • valacyclovir (VALTREX) 1 GM Tab Take 1,000 mg by mouth every day. Scheduled.   Maintenance Medication.       No facility-administered encounter medications on file as of 4/9/2021.     Review of Systems   Constitutional: Negative for malaise/fatigue.   Respiratory: Positive for shortness of breath (chornic on oxygen ). Negative for cough, hemoptysis, sputum production and wheezing.    Cardiovascular: Negative for chest pain, palpitations, orthopnea, claudication, leg swelling and PND.   Gastrointestinal: Negative for nausea and vomiting.   Neurological: Negative for dizziness and weakness.   Psychiatric/Behavioral: The patient is not nervous/anxious.         Objective:   /80 (BP Location: Left arm, Patient Position: Sitting, BP Cuff Size: Adult)   Pulse 70   Resp 14   Ht 1.702 m (5' 7\")   Wt 65.2 kg (143 lb 12.8 oz)   SpO2 100%   BMI 22.52 kg/m²     Physical Exam   Constitutional: He is oriented to person, place, and time. He appears well-developed and well-nourished. No distress.   HENT:   Head: Normocephalic and atraumatic.   Eyes: Pupils are equal, round, and reactive to light.   Neck: No JVD present.   Cardiovascular: Normal rate, regular rhythm and normal heart sounds.   Pulmonary/Chest: Effort normal. He has decreased breath sounds.   Abdominal: Soft. Bowel sounds are normal. He exhibits no distension.   Musculoskeletal:         General: No edema.   Neurological: He is alert and oriented to person, place, and time.   Skin: Skin is " warm and dry. He is not diaphoretic.   Psychiatric: He has a normal mood and affect. His behavior is normal. Judgment and thought content normal.       Echocardiography Laboratory  11/6/2020  Normal left ventricular systolic function.  Left ventricular ejection fraction is visually estimated to be 60%.  Indeterminate diastolic function.  Mildly dilated right ventricle.  Normal right ventricular systolic function.  Severely dilated left atrium.  Mild to moderate mitral regurgitation.  Aortic sclerosis without stenosis.  Estimated right ventricular systolic pressure  is 40 mmHg.        Echocardiography Laboratory  10/31/2019  Compared to the images of the prior study done 06-, the severity   of mitral regurgitation appears to have worsened.  Normal left ventricular systolic function.   Moderate mitral regurgitation.      Lab Results   Component Value Date/Time    CHOLSTRLTOT 166 09/29/2020 09:32 AM    LDL 77 09/29/2020 09:32 AM    HDL 73 09/29/2020 09:32 AM    TRIGLYCERIDE 79 09/29/2020 09:32 AM       Lab Results   Component Value Date/Time    SODIUM 133 (L) 02/01/2021 09:36 AM    POTASSIUM 4.3 02/01/2021 09:36 AM    CHLORIDE 95 (L) 02/01/2021 09:36 AM    CO2 29 02/01/2021 09:36 AM    GLUCOSE 93 02/01/2021 09:36 AM    BUN 10 02/01/2021 09:36 AM    CREATININE 0.65 02/01/2021 09:36 AM    CREATININE 1.1 12/26/2008 07:59 AM     Lab Results   Component Value Date/Time    ALKPHOSPHAT 51 02/01/2021 09:36 AM    ASTSGOT 19 02/01/2021 09:36 AM    ALTSGPT 9 02/01/2021 09:36 AM    TBILIRUBIN 0.6 02/01/2021 09:36 AM        Assessment:     1. Essential hypertension     2. Paroxysmal atrial fibrillation (HCC)     3. Nonrheumatic mitral valve regurgitation  EC-ECHOCARDIOGRAM COMPLETE W/O CONT   4. PAH (pulmonary artery hypertension) (HCC)     5. Hyperlipidemia, unspecified hyperlipidemia type         Medical Decision Making:  Today's Assessment / Status / Plan:     1. Atrial Fibrillation, paroxysmal:  - rate controlled on  metoprolol 25mg BID   Chronic anticoagulation  -  [id number]: 4. Continue Eliquis 2.5 mg     2. Moderate mitral regurgitation  - Annual echocardiogram, last ECHO 11/2020 Mild to moderate   mitral regurgitation. Repeat ECHO in 6 months      3. Pulmonary hypertension:  - RVSP 40mmHg  - most likely 2/2 to lung disease  - euvolemic     4. Mixed hyperlipidemia:   - Continue statin     5. Hypertension:  - stable   - check BP at home few times a week, obtain BP cuff  -  Continue lisinopril 20mg BID      6. Emphysema, severe  Chronic hypoxic respiratory failure   - on chronic 4L of oxygen therapy      7. Coronary artery disease  -  NSTEMI 2018: Noninvasive management  - asa therapy stopped due to OAC   -  continue atorvastatin 10mg qd    Follow up in 6 months.

## 2021-05-03 ENCOUNTER — HOSPITAL ENCOUNTER (OUTPATIENT)
Dept: RADIOLOGY | Facility: MEDICAL CENTER | Age: 86
End: 2021-05-03
Attending: PHYSICIAN ASSISTANT
Payer: MEDICARE

## 2021-05-03 DIAGNOSIS — R91.8 PULMONARY NODULES: ICD-10-CM

## 2021-05-03 PROCEDURE — 71250 CT THORAX DX C-: CPT | Mod: ME

## 2021-05-04 ENCOUNTER — TELEPHONE (OUTPATIENT)
Dept: CARDIOLOGY | Facility: MEDICAL CENTER | Age: 86
End: 2021-05-04

## 2021-05-04 ENCOUNTER — PATIENT OUTREACH (OUTPATIENT)
Dept: HEALTH INFORMATION MANAGEMENT | Facility: OTHER | Age: 86
End: 2021-05-04

## 2021-05-04 DIAGNOSIS — I27.21 PAH (PULMONARY ARTERY HYPERTENSION) (HCC): ICD-10-CM

## 2021-05-04 DIAGNOSIS — I48.91 ATRIAL FIBRILLATION, UNSPECIFIED TYPE (HCC): ICD-10-CM

## 2021-05-04 NOTE — PROGRESS NOTES
Outcome:   is not interstead on the  Comprehensive Geriatric Assessment    Please transfer to Patient Outreach Team at [phone] when patient returns call.      Attempt #1

## 2021-05-04 NOTE — TELEPHONE ENCOUNTER
Called pt. To advise. New RX sent to pharmacy.      FW: Eliquis Dose  Received: Today  Message Contents   Redet SHANNA Adhikari L.P.N.   Lets increase his dose to eliquis 5mg BID

## 2021-05-04 NOTE — TELEPHONE ENCOUNTER
----- Message from SHANNA East sent at 5/4/2021  3:22 PM PDT -----  Regarding: FW: Eliquis Dose  Lets increase his dose to eliquis 5mg BID   ----- Message -----  From: Salbador Siegel PharmD  Sent: 5/4/2021   9:06 AM PDT  To: SHANNA East  Subject: Eliquis Dose                                     Hello Redet,    I'm currently conducting a population health project and am reviewing anticoagulation regimens in pt's w/ afib. Please see my recommendations below:     I noted that this pt is currently on dose-adjusted Eliquis (2.5 mg BID). The only parameter that would qualify him for dose-adjustment is his age > 80. He is currently > 60 kg and his SrCr is < 1.5. Given this, he is a candidate for full dose Eliquis (5 mg BID) per package insert.     I did note that his weight is borderline for dose-adjustment. I just wanted to ensure that you were aware!     Thank you,   Salbador

## 2021-06-01 DIAGNOSIS — E78.5 HYPERLIPIDEMIA, UNSPECIFIED HYPERLIPIDEMIA TYPE: ICD-10-CM

## 2021-06-01 RX ORDER — ATORVASTATIN CALCIUM 10 MG/1
10 TABLET, FILM COATED ORAL DAILY
Qty: 100 TABLET | Refills: 3 | Status: SHIPPED | OUTPATIENT
Start: 2021-06-01 | End: 2022-04-26

## 2021-06-06 DIAGNOSIS — I48.91 ATRIAL FIBRILLATION, UNSPECIFIED TYPE (HCC): ICD-10-CM

## 2021-06-06 DIAGNOSIS — I27.21 PAH (PULMONARY ARTERY HYPERTENSION) (HCC): ICD-10-CM

## 2021-06-08 DIAGNOSIS — I48.91 ATRIAL FIBRILLATION, UNSPECIFIED TYPE (HCC): ICD-10-CM

## 2021-06-08 DIAGNOSIS — I27.21 PAH (PULMONARY ARTERY HYPERTENSION) (HCC): ICD-10-CM

## 2021-06-10 DIAGNOSIS — J44.9 CHRONIC OBSTRUCTIVE PULMONARY DISEASE, UNSPECIFIED COPD TYPE (HCC): ICD-10-CM

## 2021-06-10 NOTE — TELEPHONE ENCOUNTER
Have we ever prescribed this med? Yes.  If yes, what date? 03/29/2021    Last OV: 03/10/2021 - Antionette Rooney    Next OV: none scheduled    DX: COPD    Medications: Trelegy

## 2021-07-20 ENCOUNTER — HOSPITAL ENCOUNTER (OUTPATIENT)
Dept: LAB | Facility: MEDICAL CENTER | Age: 86
End: 2021-07-20
Attending: FAMILY MEDICINE
Payer: MEDICARE

## 2021-07-20 PROCEDURE — 36415 COLL VENOUS BLD VENIPUNCTURE: CPT

## 2021-07-20 PROCEDURE — 80053 COMPREHEN METABOLIC PANEL: CPT

## 2021-07-21 LAB
ALBUMIN SERPL BCP-MCNC: 4.6 G/DL (ref 3.2–4.9)
ALBUMIN/GLOB SERPL: 1.8 G/DL
ALP SERPL-CCNC: 51 U/L (ref 30–99)
ALT SERPL-CCNC: 13 U/L (ref 2–50)
ANION GAP SERPL CALC-SCNC: 11 MMOL/L (ref 7–16)
AST SERPL-CCNC: 24 U/L (ref 12–45)
BILIRUB SERPL-MCNC: 0.7 MG/DL (ref 0.1–1.5)
BUN SERPL-MCNC: 12 MG/DL (ref 8–22)
CALCIUM SERPL-MCNC: 9.3 MG/DL (ref 8.5–10.5)
CHLORIDE SERPL-SCNC: 93 MMOL/L (ref 96–112)
CO2 SERPL-SCNC: 28 MMOL/L (ref 20–33)
CREAT SERPL-MCNC: 0.87 MG/DL (ref 0.5–1.4)
GLOBULIN SER CALC-MCNC: 2.5 G/DL (ref 1.9–3.5)
GLUCOSE SERPL-MCNC: 133 MG/DL (ref 65–99)
POTASSIUM SERPL-SCNC: 4.9 MMOL/L (ref 3.6–5.5)
PROT SERPL-MCNC: 7.1 G/DL (ref 6–8.2)
SODIUM SERPL-SCNC: 132 MMOL/L (ref 135–145)

## 2021-10-22 ENCOUNTER — OFFICE VISIT (OUTPATIENT)
Dept: CARDIOLOGY | Facility: MEDICAL CENTER | Age: 86
End: 2021-10-22
Payer: MEDICARE

## 2021-10-22 VITALS
DIASTOLIC BLOOD PRESSURE: 72 MMHG | WEIGHT: 145.4 LBS | HEIGHT: 67 IN | OXYGEN SATURATION: 97 % | RESPIRATION RATE: 16 BRPM | HEART RATE: 107 BPM | BODY MASS INDEX: 22.82 KG/M2 | SYSTOLIC BLOOD PRESSURE: 138 MMHG

## 2021-10-22 DIAGNOSIS — I10 PRIMARY HYPERTENSION: ICD-10-CM

## 2021-10-22 DIAGNOSIS — I48.19 PERSISTENT ATRIAL FIBRILLATION (HCC): ICD-10-CM

## 2021-10-22 DIAGNOSIS — I34.0 NONRHEUMATIC MITRAL VALVE REGURGITATION: ICD-10-CM

## 2021-10-22 PROCEDURE — 99214 OFFICE O/P EST MOD 30 MIN: CPT | Performed by: INTERNAL MEDICINE

## 2021-10-22 ASSESSMENT — FIBROSIS 4 INDEX: FIB4 SCORE: 2.12

## 2021-10-22 NOTE — PROGRESS NOTES
"CARDIOLOGY OUTPATIENT FOLLOWUP    PCP: Barbara Wilkins M.D.    1. Nonrheumatic mitral valve regurgitation    2. Persistent atrial fibrillation (HCC)    3. Primary hypertension      Garrett Alvarez has stable class III exertional shortness of breath related to COPD and apparently poorly controlled heart rate in atrial fibrillation.  I advised 1 week of home blood pressure monitoring and heart rate monitoring but will also arrange a 24-hour Holter monitor as well as update the echocardiogram and following up mitral valve disease.  He will continue the present medications.    Follow up: 1 year    Chief Complaint   Patient presents with   • Hypertension   • Atrial Fibrillation       History: Garrett Alvarez is a 87 y.o. male with history of oxygen dependent COPD, atrial fibrillation\" non-STEMI\" diagnosed in 2018 after an altered consciousness episode.  He also has moderate mitral regurgitation.  He is accompanied by his wife today    Since our last evaluation he has stable exertional shortness of breath and utilizes 3 L of oxygen via a portable oxygen concentrator device.  He struggles to get about and inquires about a disability placard for his vehicle.  He does have a blood pressure machine though is unsure if he can find it.      ROS:   All other systems reviewed and negative except as per the HPI    PE:  /72 (BP Location: Left arm, Patient Position: Sitting, BP Cuff Size: Adult)   Pulse (!) 107   Resp 16   Ht 1.702 m (5' 7\")   Wt 66 kg (145 lb 6.4 oz)   SpO2 97%   BMI 22.77 kg/m²   Gen: frail, smells of urine  HEENT: Symmetric face. Anicteric sclerae. Moist mucus membranes  NECK: No JVD. No lymphadenopathy  CARDIAC: Regular, Normal S1, S2, No murmur  VASCULATURE: carotids are normal bilaterally without bruit  RESP: Clear to auscultation bilaterally  ABD: Soft, non-tender, non-distended  EXT: No edema, no clubbing or cyanosis  SKIN: Warm and dry  NEURO: No gross deficits  PSYCH: Appropriate " affect, participates in conversation    The ASCVD Risk score ([place] Terracelorena TINAJERO Jr, et al., 2013) failed to calculate.    Past Medical History:   Diagnosis Date   • Centrilobular emphysema (HCC) 12/3/2015   • COPD (chronic obstructive pulmonary disease) (HCC)    • Dyslipidemia    • Hypertension    • Pulmonary emphysema (HCC)    • Pulmonary hypertension (HCC)      No Known Allergies  Outpatient Encounter Medications as of 10/22/2021   Medication Sig Dispense Refill   • apixaban (ELIQUIS) 5mg Tab Take 1 tablet by mouth 2 times a day. 180 tablet 3   • atorvastatin (LIPITOR) 10 MG Tab Take 1 tablet by mouth every day. 100 tablet 3   • albuterol (PROVENTIL) 2.5mg/3ml Nebu Soln solution for nebulization albuterol sulfate 2.5 mg/3 mL (0.083 %) solution for nebulization   Inhale 2.5 mg every 4-6 hours by nebulization route for 30 days.     • albuterol 108 (90 Base) MCG/ACT Aero Soln inhalation aerosol Inhale 1 Puff by mouth every four hours as needed for Shortness of Breath.     • Home Care Oxygen Inhale 4 L/min by mouth Continuous. Oxygen dose range: 4 L/min  Respiratory route via: Nasal Cannula   Oxygen supplier: Preferred     • lisinopril (PRINIVIL) 20 MG Tab Take 20 mg by mouth 2 Times a Day.     • tamsulosin (FLOMAX) 0.4 MG capsule Take 1 Cap by mouth every bedtime. 90 Cap 3   • [DISCONTINUED] TRELEGY ELLIPTA 100-62.5-25 MCG/INH AEROSOL POWDER, BREATH ACTIVATED INHALE ONE PUFF BY MOUTH EVERY DAY RINSE MOUTH AFTER USE (Patient not taking: Reported on 10/22/2021) 1 Each 5   • metoprolol tartrate (LOPRESSOR) 25 MG Tab TAKE ONE TABLET BY MOUTH TWICE A  tablet 3   • valacyclovir (VALTREX) 1 GM Tab Take 1,000 mg by mouth every day. Scheduled.   Maintenance Medication.       No facility-administered encounter medications on file as of 10/22/2021.     Social History     Socioeconomic History   • Marital status:      Spouse name: Not on file   • Number of children: Not on file   • Years of education: Not on file   • Highest  education level: Not on file   Occupational History   • Not on file   Tobacco Use   • Smoking status: Former Smoker     Packs/day: 1.00     Years: 54.00     Pack years: 54.00     Start date:      Quit date: 2008     Years since quittin.8   • Smokeless tobacco: Never Used   • Tobacco comment: Continued abstinence advised   Vaping Use   • Vaping Use: Never used   Substance and Sexual Activity   • Alcohol use: Yes     Alcohol/week: 1.0 oz     Types: 2 Cans of beer per week     Comment: week   • Drug use: No   • Sexual activity: Yes     Partners: Female   Other Topics Concern   • Not on file   Social History Narrative   • Not on file     Social Determinants of Health     Financial Resource Strain:    • Difficulty of Paying Living Expenses:    Food Insecurity:    • Worried About Running Out of Food in the Last Year:    • Ran Out of Food in the Last Year:    Transportation Needs:    • Lack of Transportation (Medical):    • Lack of Transportation (Non-Medical):    Physical Activity:    • Days of Exercise per Week:    • Minutes of Exercise per Session:    Stress:    • Feeling of Stress :    Social Connections:    • Frequency of Communication with Friends and Family:    • Frequency of Social Gatherings with Friends and Family:    • Attends Synagogue Services:    • Active Member of Clubs or Organizations:    • Attends Club or Organization Meetings:    • Marital Status:    Intimate Partner Violence:    • Fear of Current or Ex-Partner:    • Emotionally Abused:    • Physically Abused:    • Sexually Abused:        Studies  Lab Results   Component Value Date/Time    CHOLSTRLTOT 166 2020 09:32 AM    LDL 77 2020 09:32 AM    HDL 73 2020 09:32 AM    TRIGLYCERIDE 79 2020 09:32 AM       Lab Results   Component Value Date/Time    SODIUM 132 (L) 2021 11:09 AM    POTASSIUM 4.9 2021 11:09 AM    CHLORIDE 93 (L) 2021 11:09 AM    CO2 28 2021 11:09 AM    GLUCOSE 133 (H) 2021  11:09 AM    BUN 12 07/20/2021 11:09 AM    CREATININE 0.87 07/20/2021 11:09 AM    CREATININE 1.1 12/26/2008 07:59 AM     Lab Results   Component Value Date/Time    ALKPHOSPHAT 51 07/20/2021 11:09 AM    ASTSGOT 24 07/20/2021 11:09 AM    ALTSGPT 13 07/20/2021 11:09 AM    TBILIRUBIN 0.7 07/20/2021 11:09 AM        For this encounter I reviewed the following medical records BMP, Lipid profile and CBC

## 2021-11-11 ENCOUNTER — NON-PROVIDER VISIT (OUTPATIENT)
Dept: CARDIOLOGY | Facility: MEDICAL CENTER | Age: 86
End: 2021-11-11
Payer: MEDICARE

## 2021-11-11 ENCOUNTER — TELEPHONE (OUTPATIENT)
Dept: CARDIOLOGY | Facility: MEDICAL CENTER | Age: 86
End: 2021-11-11

## 2021-11-11 DIAGNOSIS — I48.19 PERSISTENT ATRIAL FIBRILLATION (HCC): ICD-10-CM

## 2021-11-11 DIAGNOSIS — I49.3 MULTIFOCAL PVCS: ICD-10-CM

## 2021-11-15 DIAGNOSIS — I48.19 PERSISTENT ATRIAL FIBRILLATION (HCC): ICD-10-CM

## 2021-11-17 LAB — EKG IMPRESSION: NORMAL

## 2021-11-17 PROCEDURE — 93224 XTRNL ECG REC UP TO 48 HRS: CPT | Performed by: INTERNAL MEDICINE

## 2021-11-18 ENCOUNTER — TELEPHONE (OUTPATIENT)
Dept: CARDIOLOGY | Facility: MEDICAL CENTER | Age: 86
End: 2021-11-18

## 2021-11-19 NOTE — TELEPHONE ENCOUNTER
IMPRESSIONS:   1. Asymptomatic 24 hour holter monitor   2. Rare atrial and ventricular ectopy - No -atrial fibrillation during the   recording   ===============    Called pt and notified of heart montor results. Also reminded to schedule echo and provided with image scheduling number.

## 2022-01-01 DIAGNOSIS — J44.9 CHRONIC OBSTRUCTIVE PULMONARY DISEASE, UNSPECIFIED COPD TYPE (HCC): ICD-10-CM

## 2022-01-12 ENCOUNTER — APPOINTMENT (OUTPATIENT)
Dept: SLEEP MEDICINE | Facility: MEDICAL CENTER | Age: 87
End: 2022-01-12
Payer: MEDICARE

## 2022-01-31 ENCOUNTER — OFFICE VISIT (OUTPATIENT)
Dept: SLEEP MEDICINE | Facility: MEDICAL CENTER | Age: 87
End: 2022-01-31
Payer: MEDICARE

## 2022-01-31 VITALS
SYSTOLIC BLOOD PRESSURE: 134 MMHG | HEIGHT: 67 IN | OXYGEN SATURATION: 94 % | RESPIRATION RATE: 16 BRPM | DIASTOLIC BLOOD PRESSURE: 80 MMHG | HEART RATE: 98 BPM | WEIGHT: 144 LBS | BODY MASS INDEX: 22.6 KG/M2

## 2022-01-31 DIAGNOSIS — Z87.891 FORMER SMOKER: ICD-10-CM

## 2022-01-31 DIAGNOSIS — J96.11 CHRONIC RESPIRATORY FAILURE WITH HYPOXIA (HCC): ICD-10-CM

## 2022-01-31 DIAGNOSIS — J44.9 CHRONIC OBSTRUCTIVE PULMONARY DISEASE, UNSPECIFIED COPD TYPE (HCC): ICD-10-CM

## 2022-01-31 DIAGNOSIS — R91.8 PULMONARY NODULES: ICD-10-CM

## 2022-01-31 PROCEDURE — 99214 OFFICE O/P EST MOD 30 MIN: CPT | Performed by: PHYSICIAN ASSISTANT

## 2022-01-31 RX ORDER — ALBUTEROL SULFATE 90 UG/1
1 AEROSOL, METERED RESPIRATORY (INHALATION) EVERY 4 HOURS PRN
Qty: 8.5 G | Refills: 5 | Status: SHIPPED | OUTPATIENT
Start: 2022-01-31

## 2022-01-31 ASSESSMENT — ENCOUNTER SYMPTOMS
SHORTNESS OF BREATH: 1
DIZZINESS: 0
WEIGHT LOSS: 0
INSOMNIA: 0
CHILLS: 0
FEVER: 0
WHEEZING: 1
PALPITATIONS: 0
HEADACHES: 0
SPUTUM PRODUCTION: 0
COUGH: 1
SINUS PAIN: 0
SORE THROAT: 0
HEARTBURN: 0
TREMORS: 0
ORTHOPNEA: 0

## 2022-01-31 ASSESSMENT — COPD QUESTIONNAIRES
QUESTION4_WALKINCLINE: VERY BREATHLESS
MMRC DYSPNEA SCALE: I STOP FOR BREATH AFTER WALKING 100 YARDS OR AFTER A FEW MINUTES ON LEVEL GROUND
QUESTION1_COUGHFREQUENCY: 2
TOTAL_EXACERBATIONS_PASTYEAR: 0 OR 1 WITHOUT HOSPITALIZATION
QUESTION7_SLEEPQUALITY: SLEEPS VERY SOUNDLY
QUESTION3_CHESTTIGHTNESS: NO TIGHTNESS AT ALL
QUESTION2_PHLEGM: NO PHLEGM (MUCUS) AT ALL
QUESTION6_LEAVINGHOUSE: CONFIDENT LEAVING HOME
QUESTION5_HOMEACTIVITIES: 3
CAT_TOTALSCORE: 16
QUESTION8_ENERGYLEVEL: 3
GOLD_GROUP: GOLD GROUP B

## 2022-01-31 ASSESSMENT — FIBROSIS 4 INDEX: FIB4 SCORE: 2.12

## 2022-01-31 NOTE — PROGRESS NOTES
CC: COPD    HPI:  Isidro Alvarez is a 87 y.o. year old male here today for follow-up on COPD and chronic respiratory failure with hypoxia. Last seen in clinic 3/10/2021.  Patient is a former smoker with reported 54-pack-year history and quit date in 2008.    Pertinent past medical history includes multifocal pneumonia, ground-level falls, atrial fibrillation, pulmonary hypertension, pulmonary nodules, sepsis, bilateral hydronephrosis and STEMI.  Patient denies recent exacerbation.    Reviewed in clinic vitals blood pressure 134/80, HR 98, O2 sat 94% on 4 L and BMI of 22.55 kg/m².    Reviewed home medication regimen including Trelegy 100, albuterol inhaler and nebulizer, Eliquis, lisinopril-denies persistent cough, oxygen 4 L ATC.    Reviewed most recent imaging including chest CT obtained 5/3/2021 demonstrating markedly hyperexpanded and emphysematous lungs, large bullae and blebs, right middle lobe nodule previously 8 mm now 4.9 mm, new 5.8 mm left lower lobe nodule, new 4.7 mm nodule within the lingula, bilateral peripheral reticular opacities, no honeycombing, small bilateral pleural plaques and pleural plaque calcifications, 7.6 mm short axis subcarinal mediastinal node previously measured 12 mm.  Marked coronary artery calcifications.    Echocardiogram obtained 10/31/2019 demonstrated normal left ventricular chamber size, wall thickness and systolic function, LVEF estimated 55%, indeterminant diastolic function, normal right ventricular size and systolic function, enlarged right atrium, moderately dilated left atrium, worsening mitral regurgitation-moderate, moderate tricuspid regurgitation with estimated RVSP of 60 mmHg.     Most recent pulmonary function testing 11/10/2018 demonstrated FEV1 of 0.88 L or 40% predicted, FVC of 2.45 L or 78% predicted, FEV1/FVC ratio of 36, residual volume 256% predicted, % predicted, DLCO 41% predicted. Per pulmonologist interpretation severe obstructive pulmonary disease,  partial reversibility, reduction in DLCO compatible with clinical diagnosis of emphysema.    Review of Systems   Constitutional: Positive for malaise/fatigue ( mild). Negative for chills, fever and weight loss.   HENT: Positive for hearing loss. Negative for congestion, nosebleeds, sinus pain, sore throat and tinnitus.    Respiratory: Positive for cough (occasional ), shortness of breath (with activity ) and wheezing. Negative for sputum production.    Cardiovascular: Negative for chest pain, palpitations, orthopnea and leg swelling.   Gastrointestinal: Negative for heartburn.        Upper, no difficulty swallowing    Neurological: Negative for dizziness, tremors and headaches.   Psychiatric/Behavioral: The patient does not have insomnia.        Past Medical History:   Diagnosis Date   • Centrilobular emphysema (HCC) 12/3/2015   • COPD (chronic obstructive pulmonary disease) (HCC)    • Dyslipidemia    • Hypertension    • Pulmonary emphysema (HCC)    • Pulmonary hypertension (HCC)        Past Surgical History:   Procedure Laterality Date   • HERNIA REPAIR      right inguinal hernia        Family History   Problem Relation Age of Onset   • Heart Disease Father    • Heart Attack Brother    • Other Brother         CAD   • Cancer Brother         lung, other       Social History     Socioeconomic History   • Marital status:      Spouse name: Not on file   • Number of children: Not on file   • Years of education: Not on file   • Highest education level: Not on file   Occupational History   • Not on file   Tobacco Use   • Smoking status: Former Smoker     Packs/day: 1.00     Years: 54.00     Pack years: 54.00     Start date:      Quit date: 2008     Years since quittin.0   • Smokeless tobacco: Never Used   • Tobacco comment: Continued abstinence advised   Vaping Use   • Vaping Use: Never used   Substance and Sexual Activity   • Alcohol use: Yes     Alcohol/week: 1.0 oz     Types: 2 Cans of beer per  "week     Comment: week   • Drug use: No   • Sexual activity: Yes     Partners: Female   Other Topics Concern   • Not on file   Social History Narrative   • Not on file     Social Determinants of Health     Financial Resource Strain:    • Difficulty of Paying Living Expenses: Not on file   Food Insecurity:    • Worried About Running Out of Food in the Last Year: Not on file   • Ran Out of Food in the Last Year: Not on file   Transportation Needs:    • Lack of Transportation (Medical): Not on file   • Lack of Transportation (Non-Medical): Not on file   Physical Activity:    • Days of Exercise per Week: Not on file   • Minutes of Exercise per Session: Not on file   Stress:    • Feeling of Stress : Not on file   Social Connections:    • Frequency of Communication with Friends and Family: Not on file   • Frequency of Social Gatherings with Friends and Family: Not on file   • Attends Hindu Services: Not on file   • Active Member of Clubs or Organizations: Not on file   • Attends Club or Organization Meetings: Not on file   • Marital Status: Not on file   Intimate Partner Violence:    • Fear of Current or Ex-Partner: Not on file   • Emotionally Abused: Not on file   • Physically Abused: Not on file   • Sexually Abused: Not on file   Housing Stability:    • Unable to Pay for Housing in the Last Year: Not on file   • Number of Places Lived in the Last Year: Not on file   • Unstable Housing in the Last Year: Not on file       Allergies as of 01/31/2022   • (No Known Allergies)        @Vital signs for this encounter:  /80   Pulse 98   Resp 16   Ht 1.702 m (5' 7\")   Wt 65.3 kg (144 lb)   SpO2 94%     Current medications as of today   Current Outpatient Medications   Medication Sig Dispense Refill   • albuterol 108 (90 Base) MCG/ACT Aero Soln inhalation aerosol Inhale 1 Puff every four hours as needed for Shortness of Breath. 8.5 g 5   • TRELEGY ELLIPTA 100-62.5-25 MCG/INH AEROSOL POWDER, BREATH ACTIVATED " inhalation INHALE 1 PUFF BY MOUTH DAILY. RINSE MOUTH AFTER USE. 1 Each 5   • apixaban (ELIQUIS) 5mg Tab Take 1 tablet by mouth 2 times a day. 180 tablet 3   • metoprolol tartrate (LOPRESSOR) 25 MG Tab TAKE ONE TABLET BY MOUTH TWICE A  tablet 3   • atorvastatin (LIPITOR) 10 MG Tab Take 1 tablet by mouth every day. 100 tablet 3   • albuterol (PROVENTIL) 2.5mg/3ml Nebu Soln solution for nebulization albuterol sulfate 2.5 mg/3 mL (0.083 %) solution for nebulization   Inhale 2.5 mg every 4-6 hours by nebulization route for 30 days.     • lisinopril (PRINIVIL) 20 MG Tab Take 20 mg by mouth 2 Times a Day.     • valacyclovir (VALTREX) 1 GM Tab Take 1,000 mg by mouth every day. Scheduled.   Maintenance Medication.     • tamsulosin (FLOMAX) 0.4 MG capsule Take 1 Cap by mouth every bedtime. 90 Cap 3   • Home Care Oxygen Inhale 4 L/min by mouth Continuous. Oxygen dose range: 4 L/min  Respiratory route via: Nasal Cannula   Oxygen supplier: Preferred       No current facility-administered medications for this visit.         Physical Exam:   Gen:           Alert and oriented, No apparent distress. Mood and affect appropriate, normal interaction with provider.  Eyes:          sclere white, conjunctive moist.  Hearing:     Grossly intact.  Dentition:    Upper dentures, fair lower dentition  Oropharynx:   Tongue normal, posterior pharynx without erythema or exudate.  Neck:        Supple, trachea midline, no masses.  Respiratory Effort: No intercostal retractions or use of accessory muscles.   Lung Auscultation:      Diminished throughout; no rales, rhonchi or wheezing.  CV:            Regular rate and rhythm. No edema. No murmurs, rubs or gallops.  Digits, Nails, Ext: No clubbing, cyanosis, petechiae, or nodes.   Skin:        No rashes, lesions or ulcers noted on exposed skin surfaces.                     Assessment:  1. Chronic obstructive pulmonary disease, unspecified COPD type (HCC)  albuterol 108 (90 Base) MCG/ACT Aero  Soln inhalation aerosol    CT-CHEST (THORAX) W/O   2. Chronic respiratory failure with hypoxia (HCC)     3. Pulmonary nodules     4. Former smoker         Immunizations:    Flu: 9/16/2021  Pneumovax 23: 4/18/2019, 4/18/2018  Prevnar 13: 10/21/2014    Plan:  87 y.o. year old male here today for follow-up on COPD and chronic respiratory failure with hypoxia. Last seen in clinic 3/10/2021.  Patient is a former smoker with reported 54-pack-year history and quit date in 2008.    Former smoker: Remains abstinent of tobacco.    Pertinent past medical history includes multifocal pneumonia, ground-level falls, atrial fibrillation, pulmonary hypertension, pulmonary nodules, sepsis, bilateral hydronephrosis and STEMI.  Patient denies recent exacerbation.    Reviewed in clinic vitals blood pressure 134/80, HR 98, O2 sat 94% on 4 L and BMI of 22.55 kg/m².    Chronic respiratory failure with hypoxia: Continues to use and benefit from use of O2 at 4 L ATC.    Reviewed home medication regimen including Trelegy 100, albuterol inhaler and nebulizer, Eliquis, lisinopril-denies persistent cough, oxygen 4 L ATC.    Pulmonary nodules: Follow-up CT scan in May 2022.    COPD: Continue Trelegy 100, refill albuterol.    This dictation was created using voice recognition software. The accuracy of the dictation is limited to the abilities of the software. I expect there may be some errors of grammar and possibly content.

## 2022-02-17 ENCOUNTER — TELEPHONE (OUTPATIENT)
Dept: HEALTH INFORMATION MANAGEMENT | Facility: OTHER | Age: 87
End: 2022-02-17

## 2022-02-17 NOTE — TELEPHONE ENCOUNTER
Outcome: Voicemail is not set up. Unable to leave message for AGUSTINA.     Please transfer to Patient Outreach Team at [phone] when patient returns call.    Attempt # 1

## 2022-02-23 ENCOUNTER — HOSPITAL ENCOUNTER (OUTPATIENT)
Dept: LAB | Facility: MEDICAL CENTER | Age: 87
End: 2022-02-23
Attending: FAMILY MEDICINE
Payer: MEDICARE

## 2022-02-23 LAB
ALBUMIN SERPL BCP-MCNC: 4.5 G/DL (ref 3.2–4.9)
ALBUMIN/GLOB SERPL: 1.7 G/DL
ALP SERPL-CCNC: 58 U/L (ref 30–99)
ALT SERPL-CCNC: 9 U/L (ref 2–50)
ANION GAP SERPL CALC-SCNC: 11 MMOL/L (ref 7–16)
AST SERPL-CCNC: 22 U/L (ref 12–45)
BASOPHILS # BLD AUTO: 0.3 % (ref 0–1.8)
BASOPHILS # BLD: 0.02 K/UL (ref 0–0.12)
BILIRUB SERPL-MCNC: 0.7 MG/DL (ref 0.1–1.5)
BUN SERPL-MCNC: 12 MG/DL (ref 8–22)
CALCIUM SERPL-MCNC: 9.5 MG/DL (ref 8.5–10.5)
CHLORIDE SERPL-SCNC: 97 MMOL/L (ref 96–112)
CHOLEST SERPL-MCNC: 224 MG/DL (ref 100–199)
CO2 SERPL-SCNC: 28 MMOL/L (ref 20–33)
CREAT SERPL-MCNC: 0.75 MG/DL (ref 0.5–1.4)
EOSINOPHIL # BLD AUTO: 0.11 K/UL (ref 0–0.51)
EOSINOPHIL NFR BLD: 1.6 % (ref 0–6.9)
ERYTHROCYTE [DISTWIDTH] IN BLOOD BY AUTOMATED COUNT: 48.5 FL (ref 35.9–50)
EST. AVERAGE GLUCOSE BLD GHB EST-MCNC: 105 MG/DL
FASTING STATUS PATIENT QL REPORTED: NORMAL
GLOBULIN SER CALC-MCNC: 2.6 G/DL (ref 1.9–3.5)
GLUCOSE SERPL-MCNC: 102 MG/DL (ref 65–99)
HBA1C MFR BLD: 5.3 % (ref 4–5.6)
HCT VFR BLD AUTO: 43.3 % (ref 42–52)
HDLC SERPL-MCNC: 90 MG/DL
HGB BLD-MCNC: 14 G/DL (ref 14–18)
IMM GRANULOCYTES # BLD AUTO: 0.02 K/UL (ref 0–0.11)
IMM GRANULOCYTES NFR BLD AUTO: 0.3 % (ref 0–0.9)
LDLC SERPL CALC-MCNC: 119 MG/DL
LYMPHOCYTES # BLD AUTO: 1.22 K/UL (ref 1–4.8)
LYMPHOCYTES NFR BLD: 18.3 % (ref 22–41)
MCH RBC QN AUTO: 31.3 PG (ref 27–33)
MCHC RBC AUTO-ENTMCNC: 32.3 G/DL (ref 33.7–35.3)
MCV RBC AUTO: 96.9 FL (ref 81.4–97.8)
MONOCYTES # BLD AUTO: 0.7 K/UL (ref 0–0.85)
MONOCYTES NFR BLD AUTO: 10.5 % (ref 0–13.4)
NEUTROPHILS # BLD AUTO: 4.61 K/UL (ref 1.82–7.42)
NEUTROPHILS NFR BLD: 69 % (ref 44–72)
NRBC # BLD AUTO: 0 K/UL
NRBC BLD-RTO: 0 /100 WBC
NT-PROBNP SERPL IA-MCNC: 751 PG/ML (ref 0–125)
PLATELET # BLD AUTO: 252 K/UL (ref 164–446)
PMV BLD AUTO: 10 FL (ref 9–12.9)
POTASSIUM SERPL-SCNC: 5.1 MMOL/L (ref 3.6–5.5)
PROT SERPL-MCNC: 7.1 G/DL (ref 6–8.2)
RBC # BLD AUTO: 4.47 M/UL (ref 4.7–6.1)
SODIUM SERPL-SCNC: 136 MMOL/L (ref 135–145)
TRIGL SERPL-MCNC: 74 MG/DL (ref 0–149)
TSH SERPL DL<=0.005 MIU/L-ACNC: 2.51 UIU/ML (ref 0.38–5.33)
WBC # BLD AUTO: 6.7 K/UL (ref 4.8–10.8)

## 2022-02-23 PROCEDURE — 83880 ASSAY OF NATRIURETIC PEPTIDE: CPT

## 2022-02-23 PROCEDURE — 85025 COMPLETE CBC W/AUTO DIFF WBC: CPT

## 2022-02-23 PROCEDURE — 80061 LIPID PANEL: CPT

## 2022-02-23 PROCEDURE — 83036 HEMOGLOBIN GLYCOSYLATED A1C: CPT

## 2022-02-23 PROCEDURE — 36415 COLL VENOUS BLD VENIPUNCTURE: CPT

## 2022-02-23 PROCEDURE — 84443 ASSAY THYROID STIM HORMONE: CPT

## 2022-02-23 PROCEDURE — 80053 COMPREHEN METABOLIC PANEL: CPT

## 2022-03-17 ENCOUNTER — TELEPHONE (OUTPATIENT)
Dept: SLEEP MEDICINE | Facility: MEDICAL CENTER | Age: 87
End: 2022-03-17
Payer: MEDICARE

## 2022-03-17 NOTE — TELEPHONE ENCOUNTER
MEDICATION PRIOR AUTHORIZATION NEEDED:    1. Name of Medication: Trelegy Ellipta 100/62.5/25 mcg    2. Requested By (Name of Pharmacy): [facility]     3. Is insurance on file current? yes    4. What is the name & phone number of the 3rd party payor? [facility] [place] [phone]

## 2022-04-25 DIAGNOSIS — E78.5 HYPERLIPIDEMIA, UNSPECIFIED HYPERLIPIDEMIA TYPE: ICD-10-CM

## 2022-04-28 RX ORDER — ATORVASTATIN CALCIUM 10 MG/1
TABLET, FILM COATED ORAL
Qty: 100 TABLET | Refills: 1 | Status: SHIPPED | OUTPATIENT
Start: 2022-04-28

## 2022-04-29 ENCOUNTER — HOSPITAL ENCOUNTER (OUTPATIENT)
Dept: RADIOLOGY | Facility: MEDICAL CENTER | Age: 87
End: 2022-04-29
Attending: PHYSICIAN ASSISTANT
Payer: MEDICARE

## 2022-04-29 DIAGNOSIS — R91.8 PULMONARY NODULES: ICD-10-CM

## 2022-04-29 DIAGNOSIS — R93.89 ABNORMAL CT SCAN, CHEST: ICD-10-CM

## 2022-04-29 DIAGNOSIS — J44.9 CHRONIC OBSTRUCTIVE PULMONARY DISEASE, UNSPECIFIED COPD TYPE (HCC): ICD-10-CM

## 2022-04-29 PROCEDURE — 71250 CT THORAX DX C-: CPT | Mod: ME

## 2022-07-14 DIAGNOSIS — I48.91 ATRIAL FIBRILLATION, UNSPECIFIED TYPE (HCC): ICD-10-CM

## 2022-07-14 DIAGNOSIS — I27.21 PAH (PULMONARY ARTERY HYPERTENSION) (HCC): ICD-10-CM

## 2022-07-14 RX ORDER — APIXABAN 5 MG/1
TABLET, FILM COATED ORAL
Qty: 200 TABLET | Refills: 0 | Status: SHIPPED | OUTPATIENT
Start: 2022-07-14 | End: 2023-01-26 | Stop reason: SDUPTHER

## 2022-07-14 NOTE — TELEPHONE ENCOUNTER
Is the patient due for a refill? Yes    Was the patient seen the past year? Yes    Date of last office visit: 10/22/2021    Does the patient have an upcoming appointment?  No      Provider to refill: BE    Does the patients insurance require a 100 day supply? Yes

## 2022-07-19 DIAGNOSIS — J44.9 CHRONIC OBSTRUCTIVE PULMONARY DISEASE, UNSPECIFIED COPD TYPE (HCC): ICD-10-CM

## 2022-07-19 NOTE — TELEPHONE ENCOUNTER
Received two voicemail messages wanting to get a refill for Trelegy on the patient. The medication was last dispensed on 06/07/2022.

## 2022-08-03 ENCOUNTER — TELEPHONE (OUTPATIENT)
Dept: SCHEDULING | Facility: IMAGING CENTER | Age: 87
End: 2022-08-03
Payer: MEDICARE

## 2022-08-03 NOTE — TELEPHONE ENCOUNTER
Garrett Phan is requesting a calll back at [phone]. He would like to ask a few questions regarding some paperwork that [facility] is requesting before they will send him the equipment that he needs.    Thank you,  Gretchen RAMOS

## 2022-08-05 ENCOUNTER — OFFICE VISIT (OUTPATIENT)
Dept: SLEEP MEDICINE | Facility: MEDICAL CENTER | Age: 87
End: 2022-08-05
Payer: MEDICARE

## 2022-08-05 VITALS
DIASTOLIC BLOOD PRESSURE: 82 MMHG | BODY MASS INDEX: 21.97 KG/M2 | SYSTOLIC BLOOD PRESSURE: 136 MMHG | HEART RATE: 128 BPM | HEIGHT: 67 IN | OXYGEN SATURATION: 96 % | WEIGHT: 140 LBS

## 2022-08-05 DIAGNOSIS — J96.11 CHRONIC RESPIRATORY FAILURE WITH HYPOXIA (HCC): ICD-10-CM

## 2022-08-05 DIAGNOSIS — J44.1 COPD EXACERBATION (HCC): ICD-10-CM

## 2022-08-05 DIAGNOSIS — J44.9 CHRONIC OBSTRUCTIVE PULMONARY DISEASE, UNSPECIFIED COPD TYPE (HCC): ICD-10-CM

## 2022-08-05 DIAGNOSIS — R00.0 TACHYCARDIA: ICD-10-CM

## 2022-08-05 PROCEDURE — 99214 OFFICE O/P EST MOD 30 MIN: CPT | Performed by: PHYSICIAN ASSISTANT

## 2022-08-05 ASSESSMENT — ENCOUNTER SYMPTOMS
DIZZINESS: 0
CHILLS: 0
PALPITATIONS: 0
SORE THROAT: 0
TREMORS: 0
INSOMNIA: 0
HEADACHES: 0
SHORTNESS OF BREATH: 1
SINUS PAIN: 0
SPUTUM PRODUCTION: 1
WEIGHT LOSS: 0
FEVER: 0
ORTHOPNEA: 0
WHEEZING: 0
COUGH: 1
HEARTBURN: 0

## 2022-08-05 ASSESSMENT — FIBROSIS 4 INDEX: FIB4 SCORE: 2.53

## 2022-08-05 NOTE — PROGRESS NOTES
CC: Requalify for portable oxygen concentrator    HPI:  Garrett Alvarez is a 87 y.o. year old male here today for follow-up on COPD, oxygen needs. Last seen in clinic 1/31/2022.  He is a former smoker with reported quit date in 2008 and 54-pack-year history.    Pertinent past medical history includes multifocal pneumonia, ground-level falls, atrial fibrillation, pulmonary hypertension, pulmonary nodules, sepsis, bilateral hydronephrosis and STEMI.  He is accompanied by his daughter.    Reviewed in clinic vitals including /82, , BMI of 21.93 kg/m² and O2 sat of 90% on 3 L O2.    Reviewed home medication regimen including Trelegy 100 mcg, Eliquis 5 mg, albuterol inhaler.  Reports using albuterol inhaler twice per day.  Patient should be on metoprolol but he is uncertain whether he has been taking this.  Daughter will assist him on checking.    Reviewed most recent imaging including chest CT obtained 4/29/2022 demonstrating markedly hyperexpanded emphysematous lungs, multiple bullae and blebs demonstrated, 3.5 mm nodule right middle lobe, punctate nodule right major fissure, 8 mm nodule left lower lobe essentially unchanged, 2.6 mm nodule previously measured at 4.7 mm, 1.9 x 2.8 cm focal opacity base of the left lower lobe likely infectious etiology, mediastinal nodes including 7.8 mm and smaller similar to prior imaging, marked calcified aortic plaque, moderate right curvature lumbar spine and degenerative changes, small hiatal hernia.     Chest CT obtained 5/3/2021 demonstrating markedly hyperexpanded and emphysematous lungs, large bullae and blebs, right middle lobe nodule previously 8 mm now 4.9 mm, new 5.8 mm left lower lobe nodule, new 4.7 mm nodule within the lingula, bilateral peripheral reticular opacities, no honeycombing, small bilateral pleural plaques and pleural plaque calcifications, 7.6 mm short axis subcarinal mediastinal node previously measured 12 mm.  Marked coronary artery  calcifications.     Echocardiogram obtained 11/6/2020 demonstrated normal left ventricular chamber size, wall thickness and systolic function, LVEF estimated 60 %, indeterminant diastolic function, normal right ventricular size and systolic function, enlarged right atrium, severely dilated dilated left atrium, mitral regurgitation-mild to moderate, mild tricuspid regurgitation with estimated RVSP of 40 mmHg.     Most recent pulmonary function testing 11/10/2018 demonstrated FEV1 of 0.88 L or 40% predicted, FVC of 2.45 L or 78% predicted, FEV1/FVC ratio of 36, residual volume 256% predicted, % predicted, DLCO 41% predicted. Per pulmonologist interpretation severe obstructive pulmonary disease, partial reversibility, reduction in DLCO compatible with clinical diagnosis of emphysema.      Review of Systems   Constitutional: Negative for chills, fever, malaise/fatigue and weight loss.   HENT: Positive for hearing loss. Negative for congestion, nosebleeds, sinus pain, sore throat and tinnitus.    Respiratory: Positive for cough, sputum production (clear ) and shortness of breath. Negative for wheezing.    Cardiovascular: Negative for chest pain, palpitations, orthopnea and leg swelling.   Gastrointestinal: Negative for heartburn.        Upper dentures, no trouble swallowing    Neurological: Negative for dizziness, tremors and headaches.   Psychiatric/Behavioral: The patient does not have insomnia.        Past Medical History:   Diagnosis Date   • Centrilobular emphysema (HCC) 12/3/2015   • COPD (chronic obstructive pulmonary disease) (HCC)    • Dyslipidemia    • Hypertension    • Pulmonary emphysema (HCC)    • Pulmonary hypertension (HCC)        Past Surgical History:   Procedure Laterality Date   • HERNIA REPAIR  1990    right inguinal hernia        Family History   Problem Relation Age of Onset   • Heart Disease Father    • Heart Attack Brother    • Other Brother         CAD   • Cancer Brother         lung,  "other       Social History     Socioeconomic History   • Marital status:      Spouse name: Not on file   • Number of children: Not on file   • Years of education: Not on file   • Highest education level: Not on file   Occupational History   • Not on file   Tobacco Use   • Smoking status: Former Smoker     Packs/day: 1.00     Years: 54.00     Pack years: 54.00     Start date:      Quit date: 2008     Years since quittin.6   • Smokeless tobacco: Never Used   • Tobacco comment: Continued abstinence advised   Vaping Use   • Vaping Use: Never used   Substance and Sexual Activity   • Alcohol use: Yes     Alcohol/week: 1.0 oz     Types: 2 Cans of beer per week     Comment: week   • Drug use: No   • Sexual activity: Yes     Partners: Female   Other Topics Concern   • Not on file   Social History Narrative   • Not on file     Social Determinants of Health     Financial Resource Strain: Not on file   Food Insecurity: Not on file   Transportation Needs: Not on file   Physical Activity: Not on file   Stress: Not on file   Social Connections: Not on file   Intimate Partner Violence: Not on file   Housing Stability: Not on file       Allergies as of 2022   • (No Known Allergies)        @Vital signs for this encounter:  /82 (BP Location: Right arm, Patient Position: Sitting, BP Cuff Size: Adult)   Ht 1.702 m (5' 7\")   Wt 63.5 kg (140 lb)     Current medications as of today   Current Outpatient Medications   Medication Sig Dispense Refill   • ELIQUIS 5 MG Tab TAKE ONE TABLET BY MOUTH TWICE A  Tablet 0   • atorvastatin (LIPITOR) 10 MG Tab TAKE 1 TABLET BY MOUTH DAILY 100 Tablet 1   • albuterol 108 (90 Base) MCG/ACT Aero Soln inhalation aerosol Inhale 1 Puff every four hours as needed for Shortness of Breath. 8.5 g 5   • TRELEGY ELLIPTA 100-62.5-25 MCG/INH AEROSOL POWDER, BREATH ACTIVATED inhalation INHALE 1 PUFF BY MOUTH DAILY. RINSE MOUTH AFTER USE. 1 Each 5   • metoprolol tartrate " (LOPRESSOR) 25 MG Tab TAKE ONE TABLET BY MOUTH TWICE A  tablet 3   • valacyclovir (VALTREX) 1 GM Tab Take 1,000 mg by mouth every day. Scheduled.   Maintenance Medication.     • tamsulosin (FLOMAX) 0.4 MG capsule Take 1 Cap by mouth every bedtime. 90 Cap 3   • albuterol (PROVENTIL) 2.5mg/3ml Nebu Soln solution for nebulization albuterol sulfate 2.5 mg/3 mL (0.083 %) solution for nebulization   Inhale 2.5 mg every 4-6 hours by nebulization route for 30 days. (Patient not taking: Reported on 8/5/2022)     • Home Care Oxygen Inhale 4 L/min by mouth Continuous. Oxygen dose range: 4 L/min  Respiratory route via: Nasal Cannula   Oxygen supplier: Preferred     • lisinopril (PRINIVIL) 20 MG Tab Take 20 mg by mouth 2 Times a Day.       No current facility-administered medications for this visit.         Physical Exam:   Gen:           Alert and oriented, No apparent distress. Mood and affect appropriate, normal interaction with provider.  Eyes:          sclere white, conjunctive moist.  Hearing:     Grossly intact.  Dentition:    Poor dentition.  Oropharynx:   Tongue normal, posterior pharynx without erythema or exudate.  Neck:        Supple, trachea midline, no masses.  Respiratory Effort: No intercostal retractions or use of accessory muscles.   Lung Auscultation:      Decreased bilaterally; no rales, rhonchi or wheezing.  CV:            Regular rate and rhythm.  Trace lower extremity edema. No murmurs, rubs or gallops.  Digits, Nails, Ext: No clubbing, cyanosis, petechiae, or nodes.   Skin:        No rashes, lesions or ulcers noted on exposed skin surfaces.                     Assessment:  1. COPD exacerbation (HCC)     2. Chronic respiratory failure with hypoxia (HCC)  Multiple Oximetry    DME O2 New Set Up   3. Chronic obstructive pulmonary disease, unspecified COPD type (HCC)  Fluticasone-Umeclidin-Vilant (TRELEGY ELLIPTA) 100-62.5-25 MCG/INH AEROSOL POWDER, BREATH ACTIVATED inhalation   4. Tachycardia          Immunizations:    Flu: 9/16/2021  Pneumovax 23: 4/18/2019, 4/18/2018  Prevnar 13: 10/21/2014  Moderna SARS-CoV-2 vaccine: 11/27/2021      Plan:  87 y.o. year old male here today for follow-up on COPD, oxygen needs.  Daughter has noted some increasing frailty.    COPD: Patient with elevated heart rate despite adequate sample oxygen saturation.  Continue current medication regimen requiring albuterol inhaler about twice per day.  Refill Trelegy 100 mcg.    Chronic respiratory failure with hypoxia: Requalified for O2 in clinic.  Reordered POC as he is unable to manage a cylinder.  Send office visit note to preferred.    Tachycardia: History of persistent atrial fibrillation on metoprolol.  Patient uncertain he has been taking metoprolol.  Daughter will help him confirm this and reach out to primary care for follow-up.    Follow-up in clinic after CT scan to review results.    This dictation was created using voice recognition software. The accuracy of the dictation is limited to the abilities of the software. I expect there may be some errors of grammar and possibly content.

## 2022-08-05 NOTE — PATIENT INSTRUCTIONS
1-Patient reports he needs to re-qualify to replace portable oxygen concentrator  2-did re-qualify in clinic for oxygen at 3 L  3-HR elevated as well as respiratory rate  4-on medication review patient uncertain he is taking metoprolol  5-daughter will check on this  6-continue current regimen to include metoprolol and check with primary   7- follow up in 3 months after CT scan   8-if not taking metoprolol-restart, in check with primary  9-if taking metoprolol-with this fast heart let primary know

## 2022-08-12 ENCOUNTER — HOSPITAL ENCOUNTER (OUTPATIENT)
Dept: LAB | Facility: MEDICAL CENTER | Age: 87
End: 2022-08-12
Attending: FAMILY MEDICINE
Payer: MEDICARE

## 2022-08-12 LAB
ALBUMIN SERPL BCP-MCNC: 4.3 G/DL (ref 3.2–4.9)
ALBUMIN/GLOB SERPL: 1.7 G/DL
ALP SERPL-CCNC: 59 U/L (ref 30–99)
ALT SERPL-CCNC: 8 U/L (ref 2–50)
ANION GAP SERPL CALC-SCNC: 10 MMOL/L (ref 7–16)
AST SERPL-CCNC: 16 U/L (ref 12–45)
BASOPHILS # BLD AUTO: 0.4 % (ref 0–1.8)
BASOPHILS # BLD: 0.03 K/UL (ref 0–0.12)
BILIRUB SERPL-MCNC: 0.6 MG/DL (ref 0.1–1.5)
BUN SERPL-MCNC: 12 MG/DL (ref 8–22)
CALCIUM SERPL-MCNC: 9.4 MG/DL (ref 8.5–10.5)
CHLORIDE SERPL-SCNC: 93 MMOL/L (ref 96–112)
CHOLEST SERPL-MCNC: 185 MG/DL (ref 100–199)
CO2 SERPL-SCNC: 29 MMOL/L (ref 20–33)
CREAT SERPL-MCNC: 0.69 MG/DL (ref 0.5–1.4)
EOSINOPHIL # BLD AUTO: 0.11 K/UL (ref 0–0.51)
EOSINOPHIL NFR BLD: 1.5 % (ref 0–6.9)
ERYTHROCYTE [DISTWIDTH] IN BLOOD BY AUTOMATED COUNT: 49.8 FL (ref 35.9–50)
FASTING STATUS PATIENT QL REPORTED: NORMAL
GFR SERPLBLD CREATININE-BSD FMLA CKD-EPI: 89 ML/MIN/1.73 M 2
GLOBULIN SER CALC-MCNC: 2.6 G/DL (ref 1.9–3.5)
GLUCOSE SERPL-MCNC: 101 MG/DL (ref 65–99)
HCT VFR BLD AUTO: 41.6 % (ref 42–52)
HDLC SERPL-MCNC: 81 MG/DL
HGB BLD-MCNC: 13 G/DL (ref 14–18)
IMM GRANULOCYTES # BLD AUTO: 0.03 K/UL (ref 0–0.11)
IMM GRANULOCYTES NFR BLD AUTO: 0.4 % (ref 0–0.9)
LDLC SERPL CALC-MCNC: 91 MG/DL
LYMPHOCYTES # BLD AUTO: 0.67 K/UL (ref 1–4.8)
LYMPHOCYTES NFR BLD: 9 % (ref 22–41)
MCH RBC QN AUTO: 30.6 PG (ref 27–33)
MCHC RBC AUTO-ENTMCNC: 31.3 G/DL (ref 33.7–35.3)
MCV RBC AUTO: 97.9 FL (ref 81.4–97.8)
MONOCYTES # BLD AUTO: 0.83 K/UL (ref 0–0.85)
MONOCYTES NFR BLD AUTO: 11.1 % (ref 0–13.4)
NEUTROPHILS # BLD AUTO: 5.8 K/UL (ref 1.82–7.42)
NEUTROPHILS NFR BLD: 77.6 % (ref 44–72)
NRBC # BLD AUTO: 0 K/UL
NRBC BLD-RTO: 0 /100 WBC
NT-PROBNP SERPL IA-MCNC: 1417 PG/ML (ref 0–125)
PLATELET # BLD AUTO: 253 K/UL (ref 164–446)
PMV BLD AUTO: 10.4 FL (ref 9–12.9)
POTASSIUM SERPL-SCNC: 5 MMOL/L (ref 3.6–5.5)
PROT SERPL-MCNC: 6.9 G/DL (ref 6–8.2)
PSA SERPL-MCNC: 2.66 NG/ML (ref 0–4)
RBC # BLD AUTO: 4.25 M/UL (ref 4.7–6.1)
SODIUM SERPL-SCNC: 132 MMOL/L (ref 135–145)
TRIGL SERPL-MCNC: 67 MG/DL (ref 0–149)
TSH SERPL DL<=0.005 MIU/L-ACNC: 2.56 UIU/ML (ref 0.38–5.33)
WBC # BLD AUTO: 7.5 K/UL (ref 4.8–10.8)

## 2022-08-12 PROCEDURE — 84443 ASSAY THYROID STIM HORMONE: CPT

## 2022-08-12 PROCEDURE — 80053 COMPREHEN METABOLIC PANEL: CPT

## 2022-08-12 PROCEDURE — 83880 ASSAY OF NATRIURETIC PEPTIDE: CPT

## 2022-08-12 PROCEDURE — 80061 LIPID PANEL: CPT

## 2022-08-12 PROCEDURE — 84153 ASSAY OF PSA TOTAL: CPT

## 2022-08-12 PROCEDURE — 85025 COMPLETE CBC W/AUTO DIFF WBC: CPT

## 2022-08-12 PROCEDURE — 36415 COLL VENOUS BLD VENIPUNCTURE: CPT

## 2022-08-18 NOTE — PROCEDURES
Multi-Ox Readings  Multi Ox #1 Room air   O2 sat % at rest 88   O2 sat % on exertion 88 (pulse 149)   O2 sat average on exertion     Multi Ox #2 3 LPM   O2 sat % at rest 94   O2 sat % on exertion 90   O2 sat average on exertion       Oxygen Use 3   Oxygen Frequency 24/7   Duration of need     Is the patient mobile within the home?     CPAP Use?     BIPAP Use?     Servo Titration

## 2022-10-15 ENCOUNTER — HOSPITAL ENCOUNTER (EMERGENCY)
Facility: MEDICAL CENTER | Age: 87
End: 2022-10-15
Attending: EMERGENCY MEDICINE
Payer: MEDICARE

## 2022-10-15 VITALS
TEMPERATURE: 98.2 F | OXYGEN SATURATION: 98 % | WEIGHT: 145 LBS | SYSTOLIC BLOOD PRESSURE: 135 MMHG | BODY MASS INDEX: 22.76 KG/M2 | HEART RATE: 91 BPM | RESPIRATION RATE: 20 BRPM | DIASTOLIC BLOOD PRESSURE: 68 MMHG | HEIGHT: 67 IN

## 2022-10-15 DIAGNOSIS — N39.0 ACUTE UTI: ICD-10-CM

## 2022-10-15 DIAGNOSIS — R33.9 URINARY RETENTION: ICD-10-CM

## 2022-10-15 LAB
APPEARANCE UR: ABNORMAL
BACTERIA #/AREA URNS HPF: ABNORMAL /HPF
BILIRUB UR QL STRIP.AUTO: NEGATIVE
COLOR UR: YELLOW
EPI CELLS #/AREA URNS HPF: NEGATIVE /HPF
GLUCOSE UR STRIP.AUTO-MCNC: NEGATIVE MG/DL
HYALINE CASTS #/AREA URNS LPF: ABNORMAL /LPF
KETONES UR STRIP.AUTO-MCNC: ABNORMAL MG/DL
LEUKOCYTE ESTERASE UR QL STRIP.AUTO: ABNORMAL
MICRO URNS: ABNORMAL
NITRITE UR QL STRIP.AUTO: NEGATIVE
PH UR STRIP.AUTO: 8 [PH] (ref 5–8)
PROT UR QL STRIP: 300 MG/DL
RBC # URNS HPF: >150 /HPF
RBC UR QL AUTO: ABNORMAL
SP GR UR STRIP.AUTO: 1.02
UROBILINOGEN UR STRIP.AUTO-MCNC: 1 MG/DL
WBC #/AREA URNS HPF: ABNORMAL /HPF

## 2022-10-15 PROCEDURE — 700102 HCHG RX REV CODE 250 W/ 637 OVERRIDE(OP): Performed by: EMERGENCY MEDICINE

## 2022-10-15 PROCEDURE — 303105 HCHG CATHETER EXTRA

## 2022-10-15 PROCEDURE — 51702 INSERT TEMP BLADDER CATH: CPT

## 2022-10-15 PROCEDURE — 87086 URINE CULTURE/COLONY COUNT: CPT

## 2022-10-15 PROCEDURE — A9270 NON-COVERED ITEM OR SERVICE: HCPCS | Performed by: EMERGENCY MEDICINE

## 2022-10-15 PROCEDURE — 81001 URINALYSIS AUTO W/SCOPE: CPT

## 2022-10-15 PROCEDURE — 99284 EMERGENCY DEPT VISIT MOD MDM: CPT

## 2022-10-15 RX ORDER — SULFAMETHOXAZOLE AND TRIMETHOPRIM 800; 160 MG/1; MG/1
1 TABLET ORAL ONCE
Status: DISCONTINUED | OUTPATIENT
Start: 2022-10-15 | End: 2022-10-15

## 2022-10-15 RX ORDER — CIPROFLOXACIN 500 MG/1
500 TABLET, FILM COATED ORAL 2 TIMES DAILY
Qty: 14 TABLET | Refills: 0 | Status: SHIPPED | OUTPATIENT
Start: 2022-10-15 | End: 2022-10-22

## 2022-10-15 RX ORDER — CIPROFLOXACIN 500 MG/1
500 TABLET, FILM COATED ORAL ONCE
Status: COMPLETED | OUTPATIENT
Start: 2022-10-15 | End: 2022-10-15

## 2022-10-15 RX ADMIN — CIPROFLOXACIN 500 MG: 500 TABLET, FILM COATED ORAL at 10:21

## 2022-10-15 ASSESSMENT — FIBROSIS 4 INDEX: FIB4 SCORE: 1.97

## 2022-10-15 NOTE — ED PROVIDER NOTES
ED Provider Note    CHIEF COMPLAINT  Chief Complaint   Patient presents with    Urinary Retention     Patient has a chronic sanders catheter which is not emptying properly. Per patient he has not had urine output in Sanders drainage bag since yesterday morning. Endorses bladder pain and bilateral flank pain.        HPI  Garrett Alvarez is a 88 y.o. male who presents to the emergency department with complaint of Sanders catheter obstruction.  He states he replaced the bag yesterday and has not had any urine output since then.  Patient's had this Sanders catheter since the  of last month.  He also endorses slight abdominal distention and abdominal pain.  No fever, shakes, chills, sweats, nausea, vomiting and cannot remember if he has had this before.  He has a indwelling Sanders catheter he says secondary to COPD.    REVIEW OF SYSTEMS  Positives as above. Pertinent negatives include fever, nausea, vomiting, back pain or bloody discharge or hematuria  All other 10 review of systems are negative    PAST MEDICAL HISTORY  Past Medical History:   Diagnosis Date    Centrilobular emphysema (HCC) 12/3/2015    COPD (chronic obstructive pulmonary disease) (HCC)     Dyslipidemia     Hypertension     Pulmonary emphysema (HCC)     Pulmonary hypertension (HCC)        FAMILY HISTORY  Noncontributory    SOCIAL HISTORY  Social History     Socioeconomic History    Marital status:    Tobacco Use    Smoking status: Former     Packs/day: 1.00     Years: 54.00     Pack years: 54.00     Types: Cigarettes     Start date:      Quit date: 2008     Years since quittin.7    Smokeless tobacco: Never    Tobacco comments:     Continued abstinence advised   Vaping Use    Vaping Use: Never used   Substance and Sexual Activity    Alcohol use: Yes     Alcohol/week: 1.0 oz     Types: 2 Cans of beer per week     Comment: week    Drug use: No    Sexual activity: Yes     Partners: Female       SURGICAL HISTORY  Past Surgical History:  "  Procedure Laterality Date    HERNIA REPAIR  1990    right inguinal hernia        CURRENT MEDICATIONS  Home Medications       Reviewed by Yvonne Garcia R.N. (Registered Nurse) on 10/15/22 at 0758  Med List Status: Partial     Medication Last Dose Status   albuterol (PROVENTIL) 2.5mg/3ml Nebu Soln solution for nebulization  Active   albuterol 108 (90 Base) MCG/ACT Aero Soln inhalation aerosol  Active   atorvastatin (LIPITOR) 10 MG Tab  Active   ELIQUIS 5 MG Tab  Active   Fluticasone-Umeclidin-Vilant (TRELEGY ELLIPTA) 100-62.5-25 MCG/INH AEROSOL POWDER, BREATH ACTIVATED inhalation  Active   Home Care Oxygen  Active   metoprolol tartrate (LOPRESSOR) 25 MG Tab  Active   tamsulosin (FLOMAX) 0.4 MG capsule  Active   valacyclovir (VALTREX) 1 GM Tab  Active                    ALLERGIES  No Known Allergies    PHYSICAL EXAM  VITAL SIGNS: /68   Pulse 91   Temp 36.8 °C (98.2 °F) (Temporal)   Resp 20   Ht 1.702 m (5' 7\")   Wt 65.8 kg (145 lb)   SpO2 98%   BMI 22.71 kg/m²      Constitutional: Well developed, Well nourished, No acute distress, Non-toxic appearance.   Eyes: PERRLA, EOMI, Conjunctiva normal, No discharge.   Cardiovascular: Normal heart rate, Normal rhythm, No murmurs, No rubs, No gallops, and intact distal pulses.   Thorax & Lungs:  No respiratory distress, no rales, no rhonchi, No wheezing, No chest wall tenderness.   Abdomen: Slight abdominal distention, slight suprapubic tenderness   :   Skin: Warm, Dry, No erythema, No rash.   Extremities: Full range of motion, no deformity, no edema.  Neurologic: Alert & oriented x 3, No focal deficits noted, acting appropriately on exam.  Psychiatric: Affect normal for clinical presentation.      LABORATORY/ECG  Results for orders placed or performed during the hospital encounter of 10/15/22   URINALYSIS    Specimen: Urine   Result Value Ref Range    Color Yellow     Character Turbid (A)     Specific Gravity 1.016 <1.035    Ph 8.0 5.0 - 8.0    Glucose Negative " Negative mg/dL    Ketones Trace (A) Negative mg/dL    Protein 300 (A) Negative mg/dL    Bilirubin Negative Negative    Urobilinogen, Urine 1.0 Negative    Nitrite Negative Negative    Leukocyte Esterase Moderate (A) Negative    Occult Blood Moderate (A) Negative    Micro Urine Req Microscopic    URINE MICROSCOPIC (W/UA)   Result Value Ref Range    WBC Packed (A) /hpf    RBC >150 (A) /hpf    Bacteria Many (A) None /hpf    Epithelial Cells Negative /hpf    Hyaline Cast 0-2 /lpf        COURSE & MEDICAL DECISION MAKING  Pertinent Labs & Imaging studies reviewed. (See chart for details)  This is a pleasant 88-year-old male that presents with with urinary retention.  Lazar catheter is removed and new order placed.  The new urinalysis does reveal evidence of infection with packed white blood cells, many bacteria and greater 150 red blood cells.  The patient was watched and was able to urinate without difficulty.  The patient received ciprofloxacin here as well as prescription for the same and will be discharged with strict return precautions.  Patient will be following up with his primary care physician and urology as needed for further evaluation and management.      FINAL IMPRESSION     1. Urinary retention Active   2. Acute UTI Active       DISPOSITION:  Patient will be discharged home in stable condition.    FOLLOW UP:  [facility], Emergency Dept  [address] [facility] [address] [phone]    If symptoms worsen    Joe Nunez M.D.  [address] [facility] [address]  [phone]    Schedule an appointment as soon as possible for a visit   As needed, If symptoms worsen      OUTPATIENT MEDICATIONS:  Discharge Medication List as of 10/15/2022 10:43 AM        START taking these medications    Details   ciprofloxacin (CIPRO) 500 MG Tab Take 1 Tablet by mouth 2 times a day for 7 days., Disp-14 Tablet, R-0, Normal                    Electronically signed by: Hugh Thakkar D.O.,  10/15/2022 8:31 AM

## 2022-10-15 NOTE — ED TRIAGE NOTES
Chief Complaint   Patient presents with    Urinary Retention     Patient has a chronic sanders catheter which is not emptying properly. Per patient he has not had urine output in Sanders drainage bag since yesterday morning. Endorses bladder pain and bilateral flank pain.

## 2022-10-17 LAB
BACTERIA UR CULT: NORMAL
SIGNIFICANT IND 70042: NORMAL
SITE SITE: NORMAL
SOURCE SOURCE: NORMAL

## 2022-10-18 ENCOUNTER — TELEPHONE (OUTPATIENT)
Dept: SOCIAL WORK | Facility: CLINIC | Age: 87
End: 2022-10-18
Payer: MEDICARE

## 2022-10-18 NOTE — TELEPHONE ENCOUNTER
ED Follow-up  Call Attempts: 1  Date of ED visit: 10/15/22  Call Outcome: Reviewed ED visit with patient  Since you've been home, how have you been feeling?: Better  Were you prescribed any medications?: Yes  Did you  your medications from the pharmacy?: Yes  Do you have any questions regarding your medications?: No  Do you have any questions about your discharge instructions?: No  RN Recommendations: Follow-up appointment scheduled  Additional details: Instructed member to be sure to take his antibiotics until gone. Member verbalized understanding.   Total time spent (mins): 5

## 2022-10-24 ENCOUNTER — HOSPITAL ENCOUNTER (OUTPATIENT)
Dept: LAB | Facility: MEDICAL CENTER | Age: 87
End: 2022-10-24
Attending: FAMILY MEDICINE
Payer: MEDICARE

## 2022-10-24 LAB
ALBUMIN SERPL BCP-MCNC: 4.1 G/DL (ref 3.2–4.9)
ALBUMIN/GLOB SERPL: 1.5 G/DL
ALP SERPL-CCNC: 56 U/L (ref 30–99)
ALT SERPL-CCNC: 9 U/L (ref 2–50)
ANION GAP SERPL CALC-SCNC: 9 MMOL/L (ref 7–16)
AST SERPL-CCNC: 17 U/L (ref 12–45)
BASOPHILS # BLD AUTO: 0.7 % (ref 0–1.8)
BASOPHILS # BLD: 0.05 K/UL (ref 0–0.12)
BILIRUB SERPL-MCNC: 0.5 MG/DL (ref 0.1–1.5)
BUN SERPL-MCNC: 15 MG/DL (ref 8–22)
CALCIUM SERPL-MCNC: 9.4 MG/DL (ref 8.5–10.5)
CHLORIDE SERPL-SCNC: 99 MMOL/L (ref 96–112)
CO2 SERPL-SCNC: 30 MMOL/L (ref 20–33)
CREAT SERPL-MCNC: 0.74 MG/DL (ref 0.5–1.4)
EOSINOPHIL # BLD AUTO: 0.24 K/UL (ref 0–0.51)
EOSINOPHIL NFR BLD: 3.3 % (ref 0–6.9)
ERYTHROCYTE [DISTWIDTH] IN BLOOD BY AUTOMATED COUNT: 49.6 FL (ref 35.9–50)
FASTING STATUS PATIENT QL REPORTED: NORMAL
GFR SERPLBLD CREATININE-BSD FMLA CKD-EPI: 87 ML/MIN/1.73 M 2
GLOBULIN SER CALC-MCNC: 2.7 G/DL (ref 1.9–3.5)
GLUCOSE SERPL-MCNC: 100 MG/DL (ref 65–99)
HCT VFR BLD AUTO: 43.2 % (ref 42–52)
HGB BLD-MCNC: 13.4 G/DL (ref 14–18)
IMM GRANULOCYTES # BLD AUTO: 0.02 K/UL (ref 0–0.11)
IMM GRANULOCYTES NFR BLD AUTO: 0.3 % (ref 0–0.9)
LYMPHOCYTES # BLD AUTO: 1.16 K/UL (ref 1–4.8)
LYMPHOCYTES NFR BLD: 15.8 % (ref 22–41)
MCH RBC QN AUTO: 30.7 PG (ref 27–33)
MCHC RBC AUTO-ENTMCNC: 31 G/DL (ref 33.7–35.3)
MCV RBC AUTO: 98.9 FL (ref 81.4–97.8)
MONOCYTES # BLD AUTO: 0.87 K/UL (ref 0–0.85)
MONOCYTES NFR BLD AUTO: 11.9 % (ref 0–13.4)
NEUTROPHILS # BLD AUTO: 4.99 K/UL (ref 1.82–7.42)
NEUTROPHILS NFR BLD: 68 % (ref 44–72)
NRBC # BLD AUTO: 0 K/UL
NRBC BLD-RTO: 0 /100 WBC
PLATELET # BLD AUTO: 244 K/UL (ref 164–446)
PMV BLD AUTO: 10 FL (ref 9–12.9)
POTASSIUM SERPL-SCNC: 4.8 MMOL/L (ref 3.6–5.5)
PROT SERPL-MCNC: 6.8 G/DL (ref 6–8.2)
RBC # BLD AUTO: 4.37 M/UL (ref 4.7–6.1)
SODIUM SERPL-SCNC: 138 MMOL/L (ref 135–145)
WBC # BLD AUTO: 7.3 K/UL (ref 4.8–10.8)

## 2022-10-24 PROCEDURE — 36415 COLL VENOUS BLD VENIPUNCTURE: CPT

## 2022-10-24 PROCEDURE — 85025 COMPLETE CBC W/AUTO DIFF WBC: CPT

## 2022-10-24 PROCEDURE — 80053 COMPREHEN METABOLIC PANEL: CPT

## 2022-11-08 ENCOUNTER — TELEPHONE (OUTPATIENT)
Dept: SLEEP MEDICINE | Facility: MEDICAL CENTER | Age: 87
End: 2022-11-08
Payer: MEDICARE

## 2022-11-08 NOTE — TELEPHONE ENCOUNTER
11-8-22  1st NO SHOW  Date of No Show: 11-7-22  Provider: Antionette Rooney  Reason For Visit: 3M FV  Outcome of call:  no answer unable to LVM.

## 2023-01-26 DIAGNOSIS — I48.91 ATRIAL FIBRILLATION, UNSPECIFIED TYPE (HCC): ICD-10-CM

## 2023-01-26 DIAGNOSIS — I27.21 PAH (PULMONARY ARTERY HYPERTENSION) (HCC): ICD-10-CM

## 2023-01-26 NOTE — TELEPHONE ENCOUNTER
2nd courtesy refill sent to pharmacy.    To schedulers: please reach out to patient to schedule follow up, thank you!

## 2023-01-31 DIAGNOSIS — I27.21 PAH (PULMONARY ARTERY HYPERTENSION) (HCC): ICD-10-CM

## 2023-01-31 DIAGNOSIS — I48.91 ATRIAL FIBRILLATION, UNSPECIFIED TYPE (HCC): ICD-10-CM

## 2023-02-11 NOTE — TELEPHONE ENCOUNTER
Electromyography & Nerve Conduction Report:    History:  Patient has symptoms of bilateral lower limb weakness, pain and paresthesias.    Electrophysiological conclusions:  1. 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  2. There is no electrophysiological evidence of L2-S1 acute/active radiculopathy, lumbo-sacral plexopathy, myopathy, or entrapment neuropathy at bilateral lower limbs.      Many thanks for allowing us to participate in the care of your patient via electrophysiologic studies.      Raymond Daugherty MD  Dept. of PM & R  Board Certified: American Board of PM & R.  Board Certified: American Board of Electrodiagnostic Medicine.  Board Certified: American Board of Neuromuscular diseases.  Board Certified: American Board of Brain Injury Medicine.  Board Certified: Neuromuscular Ultrasound.  Electronically authenticated:  Date:  2/11/2023    Time: 10:50 AM             Detailed report:  Nerve conduction studies were performed at bilateral lower limbs:    Compound motor action potential CMAP:  Distal motor latencies normal, amplitudes attenuated, and conduction velocities slowed at bilateral tibial and right peroneal nerves.  Distal motor latency, amplitudes, and conduction velocities not recordable at left peroneal nerve.    Sensory Nerve Action Potential SNAP:  Peak sensory latencies, amplitudes and conduction velocities not recordable at bilateral saphenous, medial plantar, sural and superficial peroneal nerves.    Delayed responses:   F wave study recorded at bilateral tibial nerves with delayed latencies.    Delayed responses:   H-reflex study recorded at bilateral tibial nerves with delayed latencies.    Needle EMG:  Needle EMG performed at bilateral lower limbs and feet muscles using a disposable monopolar needle electrode  - please see table  Doreen,     I spoke w/ the pt and gave him your last message.  He states that he has just picked up the sample and will stop taking the Advair and Spiriva while taking the Anoro and that he understand.   for the muscles studied:  Insertional activity within normal limits, except insertional activity increased with positive sharp waves, fibrillation potentials at bilateral feet muscles.  Volitional MUAP's (Motor Unit Action Potentials) within normal limits, except volitional MUAP's with increased polyphasia, and chronic re-innervation potentials observed at bilateral feet muscles.  Interferential pattern within normal limits, except interferential pattern reduced at bilateral feet muscles.

## 2023-03-30 DIAGNOSIS — I48.91 ATRIAL FIBRILLATION, UNSPECIFIED TYPE (HCC): ICD-10-CM

## 2023-03-30 DIAGNOSIS — I27.21 PAH (PULMONARY ARTERY HYPERTENSION) (HCC): ICD-10-CM

## 2023-03-30 RX ORDER — APIXABAN 5 MG/1
TABLET, FILM COATED ORAL
Qty: 100 TABLET | Refills: 0 | OUTPATIENT
Start: 2023-03-30

## 2023-04-24 ENCOUNTER — APPOINTMENT (OUTPATIENT)
Dept: CARDIOLOGY | Facility: MEDICAL CENTER | Age: 88
End: 2023-04-24
Payer: MEDICARE

## 2023-04-25 ENCOUNTER — APPOINTMENT (OUTPATIENT)
Dept: CARDIOLOGY | Facility: MEDICAL CENTER | Age: 88
End: 2023-04-25
Payer: MEDICARE

## 2023-05-02 ENCOUNTER — TELEPHONE (OUTPATIENT)
Dept: HEALTH INFORMATION MANAGEMENT | Facility: OTHER | Age: 88
End: 2023-05-02

## 2023-05-18 DIAGNOSIS — I27.21 PAH (PULMONARY ARTERY HYPERTENSION) (HCC): ICD-10-CM

## 2023-05-18 DIAGNOSIS — I48.91 ATRIAL FIBRILLATION, UNSPECIFIED TYPE (HCC): ICD-10-CM

## 2023-05-23 RX ORDER — APIXABAN 5 MG/1
TABLET, FILM COATED ORAL
Qty: 100 TABLET | Refills: 0 | Status: SHIPPED | OUTPATIENT
Start: 2023-05-23

## 2023-05-23 NOTE — TELEPHONE ENCOUNTER
100 day courtesy refill sent to pharmacy. Patient not seen since 10/22/2021.    To schedulers: please reach out to patient to schedule follow up, thank you!

## 2023-09-08 ENCOUNTER — TELEPHONE (OUTPATIENT)
Dept: CARDIOLOGY | Facility: MEDICAL CENTER | Age: 88
End: 2023-09-08

## 2023-11-05 NOTE — TELEPHONE ENCOUNTER
Have we ever prescribed this med? Yes.  If yes, what date? 5/16/16    Last OV: 4/20/17- [facility]    Next OV: 10/4/17    DX: Medication Management    Medications: Advair Diskus 500      Nori manzo is attached to this message for she saw the pt last.   Bennie Heath(Attending)

## 2024-02-28 NOTE — ASSESSMENT & PLAN NOTE
Continue at this point with rate control using metoprolol XL and continue with anticoagulation using apixaban.   0